# Patient Record
Sex: MALE | Race: WHITE
[De-identification: names, ages, dates, MRNs, and addresses within clinical notes are randomized per-mention and may not be internally consistent; named-entity substitution may affect disease eponyms.]

---

## 2017-06-02 ENCOUNTER — HOSPITAL ENCOUNTER (EMERGENCY)
Dept: HOSPITAL 14 - H.ER | Age: 67
Discharge: HOME | End: 2017-06-02
Payer: MEDICARE

## 2017-06-02 VITALS
HEART RATE: 86 BPM | TEMPERATURE: 98.4 F | SYSTOLIC BLOOD PRESSURE: 146 MMHG | OXYGEN SATURATION: 97 % | DIASTOLIC BLOOD PRESSURE: 90 MMHG | RESPIRATION RATE: 20 BRPM

## 2017-06-02 VITALS — BODY MASS INDEX: 30.1 KG/M2

## 2017-06-02 DIAGNOSIS — R60.0: ICD-10-CM

## 2017-06-02 DIAGNOSIS — N99.89: Primary | ICD-10-CM

## 2017-06-02 LAB
ALBUMIN/GLOB SERPL: 1 {RATIO} (ref 1–2.1)
ALP SERPL-CCNC: 64 U/L (ref 38–126)
ALT SERPL-CCNC: 34 U/L (ref 21–72)
AST SERPL-CCNC: 44 U/L (ref 17–59)
BASOPHILS # BLD AUTO: 0.1 K/UL (ref 0–0.2)
BASOPHILS NFR BLD: 1.3 % (ref 0–2)
BILIRUB SERPL-MCNC: 0.2 MG/DL (ref 0.2–1.3)
BILIRUB UR-MCNC: NEGATIVE MG/DL
BUN SERPL-MCNC: 27 MG/DL (ref 9–20)
CALCIUM SERPL-MCNC: 8.6 MG/DL (ref 8.4–10.2)
CHLORIDE SERPL-SCNC: 104 MMOL/L (ref 98–107)
CO2 SERPL-SCNC: 26 MMOL/L (ref 22–30)
COLOR UR: YELLOW
EOSINOPHIL # BLD AUTO: 0.2 K/UL (ref 0–0.7)
EOSINOPHIL NFR BLD: 3.9 % (ref 0–4)
ERYTHROCYTE [DISTWIDTH] IN BLOOD BY AUTOMATED COUNT: 14.7 % (ref 11.5–14.5)
GLOBULIN SER-MCNC: 3.9 GM/DL (ref 2.2–3.9)
GLUCOSE SERPL-MCNC: 128 MG/DL (ref 75–110)
GLUCOSE UR STRIP-MCNC: (no result) MG/DL
HCT VFR BLD CALC: 35.7 % (ref 35–51)
KETONES UR STRIP-MCNC: NEGATIVE MG/DL
LEUKOCYTE ESTERASE UR-ACNC: (no result) LEU/UL
LYMPHOCYTES # BLD AUTO: 1.6 K/UL (ref 1–4.3)
LYMPHOCYTES NFR BLD AUTO: 26.6 % (ref 20–40)
MCH RBC QN AUTO: 25.6 PG (ref 27–31)
MCHC RBC AUTO-ENTMCNC: 32.2 G/DL (ref 33–37)
MCV RBC AUTO: 79.6 FL (ref 80–94)
MONOCYTES # BLD: 0.6 K/UL (ref 0–0.8)
MONOCYTES NFR BLD: 10.5 % (ref 0–10)
NEUTROPHILS # BLD: 3.4 K/UL (ref 1.8–7)
NEUTROPHILS NFR BLD AUTO: 57.7 % (ref 50–75)
NRBC BLD AUTO-RTO: 0 % (ref 0–0)
PH UR STRIP: 6 [PH] (ref 5–8)
PLATELET # BLD: 302 K/UL (ref 130–400)
PMV BLD AUTO: 8.1 FL (ref 7.2–11.7)
POTASSIUM SERPL-SCNC: 4.3 MMOL/L (ref 3.6–5)
PROT SERPL-MCNC: 7.7 G/DL (ref 6.3–8.2)
PROT UR STRIP-MCNC: 30 MG/DL
RBC # UR STRIP: (no result) /UL
RBC #/AREA URNS HPF: 7 /HPF (ref 0–3)
SODIUM SERPL-SCNC: 141 MMOL/L (ref 132–148)
SP GR UR STRIP: 1.02 (ref 1–1.03)
UROBILINOGEN UR-MCNC: (no result) MG/DL (ref 0.2–1)
WBC # BLD AUTO: 5.9 K/UL (ref 4.8–10.8)
WBC #/AREA URNS HPF: 2 /HPF (ref 0–5)

## 2017-06-02 NOTE — ED PDOC
HPI: Male  Pain


Time Seen by Provider: 06/02/17 12:28


Chief Complaint (Nursing): Male Genitourinary


Chief Complaint (Provider): urinary retention


History Per: Patient, Family


History/Exam Limitations: no limitations


Additional Complaint(s): 





67yo M in ED for evaluation of urinary difficulty , mild bleeding from catheter 

and concerns about this s recent surgical hernia repair. PT was admitted and 

had hernia surgery last week and d/c from [facility] yesterday-pt now in ED 

very  cornered very upset a, yelling at staff members due to his concerns about 

the surgery. Pt state the surgery was not done correctly hence his constipation 

and urinary retention. Pt denies: fever, chills, nausea vomiting abd distention 

other than surgical site, and inability to tolerate PO. pt denies cough, body 

aches. pt most former PMD: [facility]-but states he no longer wants [facility] as his 

provider. 


Pt is stating that there was neglience at [facility] and no one wants to 

help him in regards to the surgery. 


 





Past Medical History


Reviewed: Historical Data, Nursing Documentation, Vital Signs


Vital Signs: 





 Last Vital Signs











Temp  98.4 F   06/02/17 11:54


 


Pulse  86   06/02/17 11:54


 


Resp  20   06/02/17 11:54


 


BP  146/90   06/02/17 11:54


 


Pulse Ox  97   06/02/17 11:54














- Medical History


PMH: Diabetes, HTN





- Surgical History


Surgical History: Hernia Repair





- Family History


Family History: States: Unknown Family Hx





- Immunization History


Hx Tetanus Toxoid Vaccination: No


Hx Influenza Vaccination: No


Hx Pneumococcal Vaccination: No





- Home Medications


Home Medications: 


 Ambulatory Orders











 Medication  Instructions  Recorded


 


Enalapril Maleate [Enalapril] 5 mg PO DAILY 08/19/16


 


Metoprolol Tartrate [Lopressor] 25 mg PO DAILY 08/19/16


 


Rosuvastatin Calcium [Crestor] 20 mg PO DAILY 08/19/16


 


Meclizine [Antivert] 25 mg PO Q8 #10 tab 09/28/16


 


Polyethylene Glycol 3350 [Miralax] 17 gm PO DAILY PRN #10 pkg 09/28/16














- Allergies


Allergies/Adverse Reactions: 


 Allergies











Allergy/AdvReac Type Severity Reaction Status Date / Time


 


No Known Allergies Allergy   Verified 08/19/16 13:15














Review of Systems


ROS Statement: Except As Marked, All Systems Reviewed And Found Negative


Constitutional: Negative for: Fever, Chills, Weakness


Cardiovascular: Negative for: Chest Pain, Palpitations


Respiratory: Negative for: Cough, Shortness of Breath


Gastrointestinal: Negative for: Nausea, Vomiting





Physical Exam





- Reviewed


Nursing Documentation Reviewed: Yes


Vital Signs Reviewed: Yes





- Physical Exam


Appears: Positive for: Well (very aggitated, yelling at nurse), Non-toxic, No 

Acute Distress


Head Exam: Positive for: ATRAUMATIC, NORMAL INSPECTION, NORMOCEPHALIC


Skin: Positive for: Normal Color, Warm, DRY


Eye Exam: Positive for: EOMI, Normal appearance, PERRL


Cardiovascular/Chest: Positive for: Regular Rate, Rhythm


Respiratory: Positive for: CNT, Normal Breath Sounds


Gastrointestinal/Abdominal: Positive for: Normal Exam, Bowel Sounds, Soft, 

Tenderness (lower right abd-hernia repair site. post-op swelling noted, 

nontender. no wound dehescience no drianage no erythema surronding site.  )


Male Genital Exam: Positive for: other (urinary catheter in place-no 

obstruction noted. some mild truama pssible-mild bleeding noted at urethrea 

site. no testicular swelling. )


Back: Positive for: Normal Inspection


Extremity: Positive for: Normal ROM


Neurologic/Psych: Positive for: Alert, Oriented





- Laboratory Results


Result Diagrams: 


 06/02/17 13:10





 06/02/17 13:10





- ECG


O2 Sat by Pulse Oximetry: 97





- Progress


ED Course And Treament: 





MD Justo contacted-explained pt was problematic at [facility] after 

surgery, stating the surgery was not done correctly, staff , including Justo 

have explained to the pt the process and what to except, however pt refuses to 

believe that. PT has an appointment with urology this week.  





Medical Decision Making


Medical Decision Making: 





Ua: no evidence of UTI


CBC/CMP: WNL


VS are stable MD Ema made of pt 


There is no emergent concern at this time to warrant further hospital /ER 

intervention, however pt is strongly advised to maintain appt with urology this 

week. family member is with pt and made aware. 


pt advised to take motrin for pain 


advised to return to an ED if with fever, chills, nausea vomiting or wound 

dehiscence .  


pt given conceriege service contact





Disposition





- Clinical Impression


Clinical Impression: 


 Postoperative urinary retention








- Patient ED Disposition


Is Patient to be Admitted: No


Counseled Patient/Family Regarding: Studies Performed, Diagnosis, Need For 

Followup





- Disposition


Referrals: 


Mahin Cottrell MD [Medical Doctor] - 


Disposition: Routine/Home


Disposition Time: 14:14


Condition: STABLE


Additional Instructions: 


please make sure to follow up with a urologist this week. 


Instructions:  Urinary Retention in Men (ED)


Forms:  [facility] (English)

## 2017-06-02 NOTE — US
PROCEDURE:  Limited soft tissue ultrasound



HISTORY:

r/o hematoma to right lower quad surigcal site



COMPARISON:

Not available



TECHNIQUE:

Examination of the anterior abdominal wall was performed utilizing a 

curved linear array transducer.



FINDINGS:

There is a complex fluid collection with internal echoes, likely 

reflecting blood products. It is irregularly shaped but measures 

roughly 3.2 x 3.1 x 7.8 cm. There is no blood flow seen within this 

on color Doppler interrogation. There are small bright reflectors 

likely representing gas bubbles identified within this collection. 

There is no evidence of bowel herniation in this region.



IMPRESSION:

Probable postoperative collection in the anterior abdominal wall. 

Small bubbles of gas are identified within this collection. No bowel 

identified in the region interrogated.

## 2017-06-14 ENCOUNTER — HOSPITAL ENCOUNTER (INPATIENT)
Dept: HOSPITAL 31 - C.ER | Age: 67
LOS: 1 days | Discharge: LEFT BEFORE DISCHARGE | DRG: 920 | End: 2017-06-15
Attending: INTERNAL MEDICINE | Admitting: INTERNAL MEDICINE
Payer: MEDICARE

## 2017-06-14 VITALS — RESPIRATION RATE: 20 BRPM

## 2017-06-14 VITALS — BODY MASS INDEX: 30.1 KG/M2
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
## 2017-06-14 DIAGNOSIS — J98.11: ICD-10-CM

## 2017-06-14 DIAGNOSIS — E78.5: ICD-10-CM

## 2017-06-14 DIAGNOSIS — N17.9: ICD-10-CM

## 2017-06-14 DIAGNOSIS — T81.31XA: ICD-10-CM

## 2017-06-14 DIAGNOSIS — N39.0: ICD-10-CM

## 2017-06-14 DIAGNOSIS — R33.8: ICD-10-CM

## 2017-06-14 DIAGNOSIS — I25.10: ICD-10-CM

## 2017-06-14 DIAGNOSIS — I10: ICD-10-CM

## 2017-06-14 DIAGNOSIS — E11.9: ICD-10-CM

## 2017-06-14 DIAGNOSIS — N43.3: ICD-10-CM

## 2017-06-14 DIAGNOSIS — M10.9: ICD-10-CM

## 2017-06-14 DIAGNOSIS — K76.0: ICD-10-CM

## 2017-06-14 DIAGNOSIS — T85.79XA: Primary | ICD-10-CM

## 2017-06-14 DIAGNOSIS — E87.5: ICD-10-CM

## 2017-06-14 LAB
ALBUMIN/GLOB SERPL: 0.9 {RATIO} (ref 1–2.1)
ALP SERPL-CCNC: 66 U/L (ref 38–126)
ALT SERPL-CCNC: 15 U/L (ref 21–72)
APTT BLD: 26 SECONDS (ref 21–34)
AST SERPL-CCNC: 40 U/L (ref 17–59)
BACTERIA #/AREA URNS HPF: (no result) /[HPF]
BASOPHILS # BLD AUTO: 0.1 K/UL (ref 0–0.2)
BASOPHILS NFR BLD: 1.1 % (ref 0–2)
BILIRUB SERPL-MCNC: 1.3 MG/DL (ref 0.2–1.3)
BILIRUB UR-MCNC: NEGATIVE MG/DL
BUN SERPL-MCNC: 22 MG/DL (ref 9–20)
CALCIUM SERPL-MCNC: 8.2 MG/DL (ref 8.6–10.4)
CHLORIDE SERPL-SCNC: 98 MMOL/L (ref 98–107)
CO2 SERPL-SCNC: 24 MMOL/L (ref 22–30)
EOSINOPHIL # BLD AUTO: 0.1 K/UL (ref 0–0.7)
EOSINOPHIL NFR BLD: 0.8 % (ref 0–4)
ERYTHROCYTE [DISTWIDTH] IN BLOOD BY AUTOMATED COUNT: 14.5 % (ref 11.5–14.5)
GLOBULIN SER-MCNC: 4.5 GM/DL (ref 2.2–3.9)
GLUCOSE SERPL-MCNC: 107 MG/DL (ref 75–110)
GLUCOSE UR STRIP-MCNC: NORMAL MG/DL
HCT VFR BLD CALC: 34 % (ref 35–51)
INR PPP: 1.2
KETONES UR STRIP-MCNC: NEGATIVE MG/DL
LEUKOCYTE ESTERASE UR-ACNC: (no result) LEU/UL
LYMPHOCYTES # BLD AUTO: 1.3 K/UL (ref 1–4.3)
LYMPHOCYTES NFR BLD AUTO: 13.3 % (ref 20–40)
MCH RBC QN AUTO: 25.4 PG (ref 27–31)
MCHC RBC AUTO-ENTMCNC: 32.5 G/DL (ref 33–37)
MCV RBC AUTO: 78.2 FL (ref 80–94)
MONOCYTES # BLD: 0.8 K/UL (ref 0–0.8)
MONOCYTES NFR BLD: 7.8 % (ref 0–10)
NRBC BLD AUTO-RTO: 0 % (ref 0–2)
PH UR STRIP: 5 [PH] (ref 5–8)
PLATELET # BLD: 370 K/UL (ref 130–400)
PMV BLD AUTO: 8 FL (ref 7.2–11.7)
POTASSIUM SERPL-SCNC: 5.5 MMOL/L (ref 3.6–5.2)
PROT SERPL-MCNC: 8.7 G/DL (ref 6.3–8.3)
PROT UR STRIP-MCNC: (no result) MG/DL
RBC # UR STRIP: (no result) /UL
RBC #/AREA URNS HPF: 14 /HPF (ref 0–3)
SODIUM SERPL-SCNC: 132 MMOL/L (ref 132–148)
SP GR UR STRIP: 1.01 (ref 1–1.03)
UROBILINOGEN UR-MCNC: NORMAL MG/DL (ref 0.2–1)
WBC # BLD AUTO: 9.7 K/UL (ref 4.8–10.8)
WBC #/AREA URNS HPF: 152 /HPF (ref 0–5)

## 2017-06-14 RX ADMIN — TAZOBACTAM SODIUM AND PIPERACILLIN SODIUM SCH MLS/HR: 250; 2 INJECTION, SOLUTION INTRAVENOUS at 20:09

## 2017-06-14 RX ADMIN — TAZOBACTAM SODIUM AND PIPERACILLIN SODIUM SCH MLS/HR: 250; 2 INJECTION, SOLUTION INTRAVENOUS at 19:42

## 2017-06-14 NOTE — C.PDOC
History Of Present Illness





Patient with PMHX of DM, HTN presents to ED c/o RLQ pain, swelling since having 

a hernia repair at [facility] May 2017.  Patient states the area has also been 

intermittently bleeding, and yesterday he developed a fever.  He has also still 

has indwelling dalal since the surgery due to "bleeding from penis" when dalal 

removal was attempted.  He and wife do not know name of original surgeon, but 

state they do not want to see him again due to issues he has had since surgery.

   Patient denies cough, CP, SOB, vomiting/diarrhea.


Time Seen by Provider: 06/14/17 14:19


Chief Complaint (Nursing): Abdominal Pain


History Per: Patient, Family


History/Exam Limitations: no limitations


Onset/Duration Of Symptoms: Persistent


Current Symptoms Are (Timing): Still Present


Severity: Mild


Location Of Pain/Discomfort: RLQ


Quality Of Discomfort: "Pain"


Associated Symptoms: Fever





Past Medical History


Reviewed: Historical Data, Nursing Documentation, Vital Signs


Vital Signs: 


 Last Vital Signs











Temp  100.3 F H  06/14/17 14:09


 


Pulse  89   06/14/17 17:49


 


Resp  18   06/14/17 17:49


 


BP  159/81 H  06/14/17 17:49


 


Pulse Ox  95   06/14/17 18:24














- Medical History


PMH: Diabetes, HTN


Surgical History: Hernia Repair


Family History: States: No Known Family Hx





- Social History


Hx Alcohol Use: No


Hx Substance Use: No





- Immunization History


Hx Tetanus Toxoid Vaccination: No


Hx Influenza Vaccination: No


Hx Pneumococcal Vaccination: No





Review Of Systems


Except As Marked, All Systems Reviewed And Found Negative.


Constitutional: Positive for: Fever


Cardiovascular: Negative for: Chest Pain, Palpitations


Respiratory: Negative for: Cough, Shortness of Breath


Gastrointestinal: Positive for: Abdominal Pain.  Negative for: Nausea, Vomiting

, Diarrhea


Genitourinary: Positive for: Other (indwelling dalal)





Physical Exam





- Physical Exam


Appears: Non-toxic, In Acute Distress (in mild pain)


Skin: Other (RLQ/inguinal approx 6cm surgical wound with overlying skin glue, (+

) TTP and mildly swollen, no obvious erythema or discharge/bleeding)


Oral Mucosa: Moist


Cardiovascular: Rhythm Regular


Respiratory: Normal Breath Sounds, No Rales, No Rhonchi, No Wheezing


Gastrointestinal/Abdominal: Bowel Sounds, Soft, Tenderness (TTP at surgical 

wound), No Distention, No Guarding, No Rebound


Male Genital: Other (indwelling dalal catheter)


Neurological/Psych: Oriented x3





ED Course And Treatment





- Laboratory Results


Result Diagrams: 


 06/14/17 15:12





 06/14/17 15:12


O2 Sat by Pulse Oximetry: 95 (RA)


Pulse Ox Interpretation: Normal





- CT Scan/US


  ** CT ABD/PELVIS 


Other Rad Studies (CT/US): Read By Radiologist, Radiology Report Reviewed


CT/US Interpretation: Accession No. : [id number].  Patient Name / ID : 

NIESHA FUENTESUH  / [id number].  Exam Date : 06/14/2017 17:24:29 ( Approved ).  

Study Comment :  Sex / Age : M  / 66Y.  Creator : Angie Head MD.  

Dictator : Angie Head MD.   :  Approver : Angie Head MD.  Approver2 :  Report Date : 06/14/2017 17:31:52.  My Comment :  

********************************************************************************

***.  PROCEDURE:  CT Abdomen and Pelvis with contrast.  HISTORY:  rlq/inguinal 

pain, fever, swelling, r/o abscess.  COMPARISON:  None available.  TECHNIQUE:  

Contrast dose: 100 mL Visipaque.  Radiation dose:  Total exam DLP = 649.60 mGy-

cm.  This CT exam was performed using one or more of the following dose 

reduction techniques: Automated exposure control, adjustment of the mA and/or 

kV according to patient size, and/or use of iterative reconstruction technique.

  FINDINGS:  LOWER THORAX:  Left basilar atelectasis or infiltrate. Mild right 

basilar atelectasis. No visible pleural effusion or pneumothorax.  Small hiatal 

hernia/distal esophageal wall thickening.  LIVER:  Hepatomegaly. 

Hypoattenuation of the liver compatible with hepatic steatosis.  GALLBLADDER 

AND BILE DUCTS:  Unremarkable.  PANCREAS:  Unremarkable.  SPLEEN:  

Unremarkable.  ADRENALS:  Bilateral adrenal gland hypertrophy.  KIDNEYS AND 

URETERS:  The kidneys enhance symmetrically. No hydronephrosis or obstructing 

calculus identified.  VASCULATURE:  No aortic aneurysm.  BOWEL:  Stomach is 

nondistended.  Lack of oral contrast limits evaluation for bowel pathology.  

Bowel loops appear within normal limits of caliber without evidence of 

obstruction.  APPENDIX:  The presumed appendix appears within normal limits of 

caliber. No secondary signs of acute appendicitis.  PERITONEUM:  No significant 

free fluid. No definite free air.  LYMPH NODES:  No bulky adenopathy 

identified.  BLADDER:  Thick-walled under distended urinary bladder with Dalal 

catheter present.  Punctate calcification at the dependent portion of the 

bladder measuring approximately 2 mm.  REPRODUCTIVE:  Enlarged heterogeneous 

prostate gland measures approximately 5.6 x 5.5 cm. Partially imaged probable 

hydroceles.  BONES:  Degenerative changes.  Osseous demineralization. Grade 1 

retrolisthesis of L2 on L3.  Endplate sclerosis at L2-L3 with vacuum disc 

phenomenon.  Vacuum disc phenomenon is also noted at L5-S1.  OTHER FINDINGS:  

Evidence of right inguinal hernia with fluid collection, possibly abscess 

measuring maximally 7.7 x 2.8 cm (series 3, image 171). Bowel is not contained 

within this hernia sac. Fat containing left inguinal hernia.  IMPRESSION:  

Evidence of right inguinal hernia with fluid collection, possibly abscess 

measuring maximally 7.7 x 2.8 cm. Fat containing left inguinal hernia.  Thick-

walled under distended urinary bladder with Dalal catheter present. Punctate 

calcification at the dependent portion of the bladder measuring approximately 2 

mm.  Enlarged heterogeneous prostate gland.  Recommend correlation with PSA.  

Probable bilateral hydroceles.  Hepatomegaly. Hepatic steatosis.  Bilateral 

adrenal gland hypertrophy.  Left basilar atelectasis or infiltrate. Mild right 

basilar atelectasis. No visible pleural effusion or pneumothorax.  Small hiatal 

hernia/distal esophageal wall thickening.  Additional findings as above.


Progress Note: Blood work, UA, CT abd/pelvis ordered and reviewed.  Patient 

given IV NS bolus, PO tylenol.  IV rocephin given for UTI.  IV clindamycin 

added for abscess.  5:40pm- Surgical resident paged to discuss Ct scan findings 

and plan for patient - pending call back.  6:50pm- Surgical resident aware of 

consult, requests consult be changed to Dr. Dillon at Dr. Engel's request.





- Physician Consult Information


Physician Contacted: Isidro Engel Jr.


Outcome Of Conversation: Discused patient with Dr. Engel, agrees with medicine 

admission to his service with on call surgery for surgical consult.  pending 

call back from surgery resident.  Medicine resident spoken with and aware of 

admission.

## 2017-06-14 NOTE — CP.PCM.HP
History of Present Illness





- History of Present Illness


History of Present Illness: 





CC: "fever, dizzinessw" 


HPI: Patient is a 66 year old male with history of hypertension, coronary 

artery disease, and diabetes who presents to the emergency room for swelling 

and intermittent bleeding to right inguinal hernia repair site. Patient 

reportedly had a right inguinal hernia repair performed at [facility] [place] [facility] on 5/28/17. 

Patient states he initially went to [facility] [place] [facility] for "colon inflammation" and he was 

told during admission he would need hernia repair. Patient states he did not 

want to have surgery but "eventually was convinced by the team." He states 

following the surgery, there was complication of acute abdominal bleeding. 

Patient notes he immediately felt discomfort to the surgical site and "knew 

something was wrong." He reports he was informed he would need physical therapy 

but refused. Patient states he was properly discharged but was not given 

prescriptions or instruction regarding when his indwelling dalal should be 

removed. Patient reports since being home, he developed bleeding from the 

surgical site, swelling and pain. He went to [facility] approximately one 

week to be re-evaluated and was told he has a hematoma and would again require 

surgery. At this time, his dalal was attempted to be removed, however, patient 

states they were unsuccessful due to complication of bleeding. Patient 

ultimately left [facility] as he did not want surgery. Today he was 

brought into the ED by his family as he developed fevers at home. Patient also 

reports dizziness causing him difficulty to walk. He denies chest pain, 

palpitations, shortness of breath, nausea, vomiting, diarrhea, constipation, 

and lower extremity swelling. 





PMH: see HPI 


Home medications: Enalapril 5 mg po qd, Metoprolol 25 mg po qd, Crestor 20 mg 

po qd


Allergies: NKDA


Family History: not pertinent 


Surgical History: R inguinal hernia repair 5/2017


Social: Denies tobacco, alcohol and illicit drug use 








Present on Admission





- Present on Admission


Any Indicators Present on Admission: No


History of DVT/PE: No


History of Uncontrolled Diabetes: No


Urinary Catheter: No


Decubitus Ulcer Present: No





Review of Systems





- Constitutional


Constitutional: Chills, Fatigue, Fever.  absent: Headache





- EENT


Eyes: absent: Blurred Vision, Change in Vision


Ears: Decreased Hearing.  absent: Ear Pain


Nose/Mouth/Throat: absent: Nasal Congestion, Nasal Discharge





- Cardiovascular


Cardiovascular: absent: Chest Pain, Dyspnea, Dyspnea on Exertion, Edema, 

Palpitations





- Respiratory


Respiratory: absent: Cough, Wheezing, Chest Congestion





- Gastrointestinal


Gastrointestinal: As Per HPI, Abdominal Pain.  absent: Constipation, Cramping, 

Diarrhea, Nausea, Vomiting





- Genitourinary


Genitourinary: absent: Flank Pain


Additional comments: 





indwelling dalal in place, urine in dalal bag cloudy





- Musculoskeletal


Musculoskeletal: absent: Arthralgias, Back Pain





- Integumentary


Integumentary: absent: New Lesions





- Neurological


Neurological: Dizziness, Weakness.  absent: Confusion, Numbness, Headaches





- Psychiatric


Psychiatric: absent: Anxiety, Depression





Past Patient History





- Past Social History


Smoking Status: Never Smoked





- CARDIAC


Hx Hypertension: Yes





- ENDOCRINE/METABOLIC


Hx Diabetes Mellitus Type 2: Yes





- MUSCULOSKELETAL/RHEUMATOLOGICAL


Hx Gout: Yes





- GASTROINTESTINAL


Hx Constipation: Yes





- PSYCHIATRIC


Hx Substance Use: No





- SURGICAL HISTORY


Hx Surgeries: Yes


Hx Herniorrhaphy: Yes





- ANESTHESIA


Hx Anesthesia: Yes


Hx Anesthesia Reactions: No


Hx Malignant Hyperthermia: No





Meds


Allergies/Adverse Reactions: 


 Allergies











Allergy/AdvReac Type Severity Reaction Status Date / Time


 


No Known Allergies Allergy   Verified 06/14/17 14:21














Physical Exam





- Constitutional


Appears: Non-toxic, No Acute Distress





- Head Exam


Head Exam: ATRAUMATIC, NORMAL INSPECTION, NORMOCEPHALIC





- Eye Exam


Eye Exam: EOMI, Normal appearance





- ENT Exam


ENT Exam: Mucous Membranes Moist





- Respiratory Exam


Respiratory Exam: Clear to Auscultation Bilateral, NORMAL BREATHING PATTERN.  

absent: Rales, Rhonchi, Wheezes





- Cardiovascular Exam


Cardiovascular Exam: +S1, +S2.  absent: Bradycardia, Tachycardia





- GI/Abdominal Exam


GI & Abdominal Exam: Distended, Normal Bowel Sounds, Soft


Additional comments: 





4 cm surgical laceration site with dehiscence and some surrounding erythema. 

Induration of surrounding area with pain present to palpation. 





- Extremities Exam


Extremities exam: Positive for: normal inspection, pedal pulses present.  

Negative for: pedal edema, tenderness





- Back Exam


Back exam: NORMAL INSPECTION.  absent: CVA tenderness (L), CVA tenderness (R)





- Neurological Exam


Neurological exam: Alert, CN II-XII Intact, Oriented x3





- Psychiatric Exam


Psychiatric exam: Normal Affect, Normal Mood





- Skin


Skin Exam: Intact, Normal Color





Results





- Vital Signs


Recent Vital Signs: 





 Last Vital Signs











Temp  100.3 F H  06/14/17 14:09


 


Pulse  89   06/14/17 17:49


 


Resp  18   06/14/17 17:49


 


BP  159/81 H  06/14/17 17:49


 


Pulse Ox  95   06/14/17 18:50














- Labs


Result Diagrams: 


 06/14/17 15:12





 06/14/17 15:12





Assessment & Plan





- Assessment and Plan (Free Text)


Assessment: 





Right Inguinal Hernia Abscess 


No leukocytosis, Neutrophils 77.0%, temp 100.3F


CT Abd/Pelvis CT: Evidence of right inguinal hernia with fluid collection, 

possibly abscess measuring maximally 7.7 x 2.8 cm. Fat containing left inguinal 

hernia. 


Thick-walled under distended urinary bladder with Dalal catheter present. 

Punctate calcification at the dependent portion of the bladder measuring 

approximately 2 mm. Enlarged heterogeneous prostate gland. Probable bilateral 

hydroceles. Hepatomegaly. Hepatic steatosis. Bilateral adrenal gland 

hypertrophy. Left basilar atelectasis or infiltrate. Mild right basilar 

atelectasis. No visible pleural effusion or pneumothorax. Small hiatal hernia/

distal esophageal wall thickening. 


Dose of Rocephin and Clindamycin given stat in ED 


Gentamycin 80 mg IVPB and Vancomycin 1 gm IVPB given stat 


Zosyn 2.25 gm IVPB Q6h started 


Surgery, Dr. Dillon, consulted. Help appreciated. Plan for OR tomorrow.


Morphine 2 mg IVP q4h prn for pain  


NPO at midnight 


EKG, CXR, PT/PTT/INR ordered 


Tylenol PRN for fever 





UTI 


Dalal present since May


Urinalysis: color hazy, blood 2+, nitrate positive, Leuk Esterase 3+, WBC, 14, 

RBC 14, Bacterial Occ H 


f/u urine cx


f/u blood cx x2


Urology, Dr. Du, consulted. Help appreciated. Recommends starting Flomax 

0.4 mg po BID tomorrow. Will evaluate pt following surgery and remove dalal. 





Hyperkalemia 


potassium 5.5 


Kayexalate 30 gm po once given 


Albuterol given once 


f/u potassium 





DANIEL


CR 1.3 


CR on previous admission 2016, 1.1 


Started on IV normal saline at 70cc/hr 


Monitor 





Hypertension


Start home medications: Enalapril 5 mg po daily, Metoprolol 25 mg po daily 


Monitor 





Hyperlipidemia 


f/u lipid panel


continue Crestor 20 mg po HS 





Diabetes Mellitus type II 


glucose 107


accuchecks 


f/u hemoglobin a1c


Monitor 





Prophylaxis 


Protonix 40 mg IVP daily 


SCD 

















- Date & Time


Date: 06/14/17


Time: 20:24

## 2017-06-14 NOTE — CT
PROCEDURE:  CT Abdomen and Pelvis with contrast



HISTORY:

rlq/inguinal pain, fever, swelling, r/o abscess



COMPARISON:

None available.



TECHNIQUE:

Contrast dose: 100 mL Visipaque



Radiation dose:



Total exam DLP = 649.60 mGy-cm.



This CT exam was performed using one or more of the following dose 

reduction techniques: Automated exposure control, adjustment of the 

mA and/or kV according to patient size, and/or use of iterative 

reconstruction technique.



FINDINGS:



LOWER THORAX:

Left basilar atelectasis or infiltrate. Mild right basilar 

atelectasis. No visible pleural effusion or pneumothorax. 



Small hiatal hernia/distal esophageal wall thickening. 



LIVER:

Hepatomegaly. Hypoattenuation of the liver compatible with hepatic 

steatosis. 



GALLBLADDER AND BILE DUCTS:

Unremarkable. 



PANCREAS:

Unremarkable. 



SPLEEN:

Unremarkable. 



ADRENALS:

Bilateral adrenal gland hypertrophy. 



KIDNEYS AND URETERS:

The kidneys enhance symmetrically. No hydronephrosis or obstructing 

calculus identified. 



VASCULATURE:

No aortic aneurysm. 



BOWEL:

Stomach is nondistended.  



Lack of oral contrast limits evaluation for bowel pathology.  Bowel 

loops appear within normal limits of caliber without evidence of 

obstruction.



APPENDIX:

The presumed appendix appears within normal limits of caliber. No 

secondary signs of acute appendicitis.



PERITONEUM:

No significant free fluid. No definite free air. 



LYMPH NODES:

No bulky adenopathy identified. 



BLADDER:

Thick-walled under distended urinary bladder with Olivo catheter 

present.  Punctate calcification at the dependent portion of the 

bladder measuring approximately 2 mm. 



REPRODUCTIVE:

Enlarged heterogeneous prostate gland measures approximately 5.6 x 

5.5 cm. Partially imaged probable hydroceles. 



BONES:

Degenerative changes.  Osseous demineralization. Grade 1 

retrolisthesis of L2 on L3.  Endplate sclerosis at L2-L3 with vacuum 

disc phenomenon.  Vacuum disc phenomenon is also noted at L5-S1. 



OTHER FINDINGS:

Evidence of right inguinal hernia with fluid collection, possibly 

abscess measuring maximally 7.7 x 2.8 cm (series 3, image 171). Bowel 

is not contained within this hernia sac. Fat containing left inguinal 

hernia. 



IMPRESSION:

Evidence of right inguinal hernia with fluid collection, possibly 

abscess measuring maximally 7.7 x 2.8 cm. Fat containing left 

inguinal hernia. 



Thick-walled under distended urinary bladder with Olivo catheter 

present. Punctate calcification at the dependent portion of the 

bladder measuring approximately 2 mm. 



Enlarged heterogeneous prostate gland.  Recommend correlation with 

PSA. 



Probable bilateral hydroceles. 



Hepatomegaly. Hepatic steatosis. 



Bilateral adrenal gland hypertrophy. 



Left basilar atelectasis or infiltrate. Mild right basilar 

atelectasis. No visible pleural effusion or pneumothorax. 



Small hiatal hernia/distal esophageal wall thickening. 



Additional findings as above.

## 2017-06-14 NOTE — CP.PCM.CON
History of Present Illness





- History of Present Illness


History of Present Illness: 





General Surgery:  Dr Dillon





Pt is a 66M ~3 weeks s/p right inguinal hernia repair.  Pt states he was 

admitted to [facility] with abdominal pain and constipation 3 weeks ago.  While there 

he was told he had an inguinal hernia and underwent open repair.  Pt states post

-op he had hematuria and had a dalal placed.  He was discharged with dalal in 

place.  Pt states he began to develop worsening pain at the site of hernia 

repair, and wanted the dalal out, so he went back to [facility].  He was told there 

was nothing wrong and to follow up in the office.  Pt then went to [facility] where 

he had work-up and was told her had a hematoma and to return to original 

surgeon.  Pt states original surgeon told him nothing is wrong and to keep the 

dalal in for another week.  This information is all directly from pt and there 

are no records to support this available.  Pt presents today to ED with 

worsening pain, subjective fevers, and reports puss has been draining from the 

incision, which has now opened up.  Pt does not want to return to [facility] or the 

original surgeon.  Per CT scan pt appears to have an infected right inguinal 

hernia mesh.





ROS:  Denies N/V, diarrhea or constipation.  + for fevers, weakness, pain at 

site of meatus with dalal





PMH: HTN, DM2


PSH:  right inguinal hernia repair with mesh





Review of Systems





- Review of Systems


All systems: reviewed and no additional remarkable complaints except (as per hpi

)





Past Patient History





- Past Social History


Smoking Status: Never Smoked





- CARDIAC


Hx Hypertension: Yes





- ENDOCRINE/METABOLIC


Hx Diabetes Mellitus Type 2: Yes





- MUSCULOSKELETAL/RHEUMATOLOGICAL


Hx Gout: Yes





- GASTROINTESTINAL


Hx Constipation: Yes





- PSYCHIATRIC


Hx Substance Use: No





- SURGICAL HISTORY


Hx Surgeries: Yes


Hx Herniorrhaphy: Yes





- ANESTHESIA


Hx Anesthesia: Yes


Hx Anesthesia Reactions: No


Hx Malignant Hyperthermia: No





Meds


Allergies/Adverse Reactions: 


 Allergies











Allergy/AdvReac Type Severity Reaction Status Date / Time


 


No Known Allergies Allergy   Verified 06/14/17 14:21














- Medications


Medications: 


 Current Medications





Gentamicin Sulfate 80 mg/ (Sodium Chloride)  102 mls @ 100 mls/hr IVPB ONCE ONE


   Stop: 06/14/17 20:07


Vancomycin HCl 1 gm/ Sodium (Chloride)  250 mls @ 166.7 mls/hr IVPB STAT STA


   Stop: 06/14/17 20:36


Piperacillin Sod/Tazobactam Sod (Zosyn 2.25 Gm Iv Premix)  2.25 gm in 50 mls @ 

100 mls/hr IVPB Q6H [facility]


   Last Admin: 06/14/17 19:42 Dose:  100 mls/hr


Sodium Chloride (Sodium Chloride 0.9%)  1,000 mls @ 70 mls/hr IV .[id number] [facility]


   Last Admin: 06/14/17 19:42 Dose:  70 mls/hr


Tamsulosin HCl (Flomax)  0.4 mg PO BID [facility]











Physical Exam





- Constitutional


Appears: Non-toxic, No Acute Distress





- Head Exam


Head Exam: NORMOCEPHALIC





- Respiratory Exam


Respiratory Exam: absent: Accessory Muscle Use, Respiratory Distress





- Cardiovascular Exam


Cardiovascular Exam: REGULAR RHYTHM.  absent: Tachycardia





- GI/Abdominal Exam


GI & Abdominal Exam: Soft, Tenderness (at site of right inguinal hernia repair)

.  absent: Distended, Firm, Guarding


Additional comments: 





site of right inguinal hernia is erythematous, draining purulent fluid from 

medial pin-hole sized opening.  The area is swollen and indurated.  Very TTP





- Extremities Exam


Extremities exam: Negative for: pedal edema





- Neurological Exam


Neurological exam: Alert, Oriented x3





- Psychiatric Exam


Psychiatric exam: Normal Affect, Normal Mood





- Skin


Skin Exam: Normal Color, Warm





Results





- Vital Signs


Recent Vital Signs: 


 Last Vital Signs











Temp  100.3 F H  06/14/17 14:09


 


Pulse  89   06/14/17 17:49


 


Resp  18   06/14/17 17:49


 


BP  159/81 H  06/14/17 17:49


 


Pulse Ox  95   06/14/17 18:50














- Labs


Result Diagrams: 


 06/14/17 15:12





 06/14/17 15:12





Assessment & Plan





- Assessment and Plan (Free Text)


Assessment: 





66M with infected right inguinal hernia mesh


Plan: 





Pt will likely need removal of mesh


will plan for OR tomorrow


NPO @ [facility]


coags, type and sceen





will d/w Dr Santana Odonnell, PGY2





- Date & Time


Date: 06/14/17


Time: 19:54

## 2017-06-15 VITALS — DIASTOLIC BLOOD PRESSURE: 90 MMHG | SYSTOLIC BLOOD PRESSURE: 148 MMHG

## 2017-06-15 VITALS — HEART RATE: 70 BPM | OXYGEN SATURATION: 96 % | TEMPERATURE: 98.7 F

## 2017-06-15 LAB
ALBUMIN/GLOB SERPL: 0.8 {RATIO} (ref 1–2.1)
ALP SERPL-CCNC: 60 U/L (ref 38–126)
ALT SERPL-CCNC: 20 U/L (ref 21–72)
APTT BLD: 28 SECONDS (ref 21–34)
AST SERPL-CCNC: 16 U/L (ref 17–59)
BASOPHILS # BLD AUTO: 0.1 K/UL (ref 0–0.2)
BASOPHILS NFR BLD: 0.9 % (ref 0–2)
BILIRUB SERPL-MCNC: 0.6 MG/DL (ref 0.2–1.3)
BUN SERPL-MCNC: 21 MG/DL (ref 9–20)
CALCIUM SERPL-MCNC: 7.4 MG/DL (ref 8.6–10.4)
CHLORIDE SERPL-SCNC: 101 MMOL/L (ref 98–107)
CHOLEST SERPL-MCNC: 127 MG/DL (ref 0–199)
CO2 SERPL-SCNC: 23 MMOL/L (ref 22–30)
EOSINOPHIL # BLD AUTO: 0.1 K/UL (ref 0–0.7)
EOSINOPHIL NFR BLD: 0.9 % (ref 0–4)
ERYTHROCYTE [DISTWIDTH] IN BLOOD BY AUTOMATED COUNT: 14.3 % (ref 11.5–14.5)
GLOBULIN SER-MCNC: 3.6 GM/DL (ref 2.2–3.9)
GLUCOSE SERPL-MCNC: 107 MG/DL (ref 75–110)
HCT VFR BLD CALC: 31.3 % (ref 35–51)
INR PPP: 1.3
LYMPHOCYTES # BLD AUTO: 1.1 K/UL (ref 1–4.3)
LYMPHOCYTES NFR BLD AUTO: 12.7 % (ref 20–40)
MAGNESIUM SERPL-MCNC: 1.6 MG/DL (ref 1.6–2.3)
MCH RBC QN AUTO: 25.6 PG (ref 27–31)
MCHC RBC AUTO-ENTMCNC: 32.3 G/DL (ref 33–37)
MCV RBC AUTO: 79.1 FL (ref 80–94)
MONOCYTES # BLD: 0.8 K/UL (ref 0–0.8)
MONOCYTES NFR BLD: 10.2 % (ref 0–10)
NRBC BLD AUTO-RTO: 0 % (ref 0–2)
PLATELET # BLD: 301 K/UL (ref 130–400)
PMV BLD AUTO: 7.9 FL (ref 7.2–11.7)
POTASSIUM SERPL-SCNC: 3.7 MMOL/L (ref 3.6–5.2)
PROT SERPL-MCNC: 6.6 G/DL (ref 6.3–8.3)
SODIUM SERPL-SCNC: 135 MMOL/L (ref 132–148)
WBC # BLD AUTO: 8.3 K/UL (ref 4.8–10.8)

## 2017-06-15 RX ADMIN — TAZOBACTAM SODIUM AND PIPERACILLIN SODIUM SCH MLS/HR: 250; 2 INJECTION, SOLUTION INTRAVENOUS at 06:16

## 2017-06-15 RX ADMIN — TAZOBACTAM SODIUM AND PIPERACILLIN SODIUM SCH MLS/HR: 250; 2 INJECTION, SOLUTION INTRAVENOUS at 01:01

## 2017-06-15 NOTE — CARD
--------------- APPROVED REPORT --------------





EKG Measurement

Heart [id number]

ND [id number]

[id number]

[id number]

[id number]



<Conclusion>

Normal sinus rhythm

Inferior infarct, age undetermined

Abnormal ECG

## 2017-06-15 NOTE — CP.PCM.PN
Subjective





- Date & Time of Evaluation


Date of Evaluation: 06/15/17


Time of Evaluation: 11:20





- Subjective


Subjective: 





PGY-1 note for Dr. Engel's service: 





Pt seen and examined at bedside. 





Objective





- Vital Signs/Intake and Output


Vital Signs (last 24 hours): 


 











Temp Pulse Resp BP Pulse Ox


 


 98.7 F   70   20   148/90   96 


 


 06/15/17 08:16  06/15/17 08:16  06/15/17 08:16  06/15/17 08:16  06/15/17 08:16








Intake and Output: 


 











 06/15/17 06/15/17





 06:59 18:59


 


Intake Total 500 1000


 


Output Total 500 250


 


Balance 0 750














- Medications


Medications: 


 Current Medications





Enalapril Maleate (Vasotec)  5 mg PO DAILY [facility]


Piperacillin Sod/Tazobactam Sod (Zosyn 2.25 Gm Iv Premix)  2.25 gm in 50 mls @ 

100 mls/hr IVPB Q6H [facility]


   Last Admin: 06/15/17 06:16 Dose:  100 mls/hr


Sodium Chloride (Sodium Chloride 0.9%)  1,000 mls @ 125 mls/hr IV .Q8H [facility]


   Last Admin: 06/15/17 06:19 Dose:  125 mls/hr


Metoprolol Tartrate (Lopressor)  25 mg PO DAILY [facility]


Morphine Sulfate (Morphine)  2 mg IVP Q4 PRN


   PRN Reason: Pain, Mild (1-3)


Pantoprazole Sodium (Protonix Inj)  40 mg IVP DAILY [facility]


Rosuvastatin Calcium (Crestor)  20 mg PO HS [facility]


   Last Admin: 06/14/17 21:29 Dose:  20 mg


Tamsulosin HCl (Flomax)  0.4 mg PO BID [facility]











- Labs


Labs: 


 





 06/15/17 07:09 





 06/15/17 07:09 





 











PT  14.8 SECONDS (9.7-12.2)  H  06/15/17  07:09    


 


INR  1.3   06/15/17  07:09    


 


APTT  28 SECONDS (21-34)   06/15/17  07:09

## 2017-06-15 NOTE — CP.PCM.DIS
Provider





- Provider


Date of Admission: 


06/14/17 17:57





Attending physician: 


Isidro Engel Jr, MD





Primary care physician: 





none


Consults: 





Gen Surg: Santana


Urology: Ana Laura


Time Spent in preparation of Discharge (in minutes): 45





Diagnosis





- Discharge Diagnosis


(1) Postoperative urinary retention


Status: Acute   


Comment: Long term in-dwelling dalal catheter. Attempts at removal in past have 

led to bleeding.  Urology consult, Dr. Du, consulted   





(2) H/O inguinal hernia repair


Status: Acute   


Comment: Pt reports [facility] inguinal hernia repair in late May 2017. Says mesh was 

inserted and told recently at [place] it had to be removed.   





Hospital Course





- Lab Results


Lab Results: 


 Most Recent Lab Values











WBC  8.3 K/uL (4.8-10.8)   06/15/17  07:09    


 


RBC  3.96 Mil/uL (4.40-5.90)  L  06/15/17  07:09    


 


Hgb  10.1 g/dL (12.0-18.0)  L  06/15/17  07:09    


 


Hct  31.3 % (35.0-51.0)  L  06/15/17  07:09    


 


MCV  79.1 fL (80.0-94.0)  L  06/15/17  07:09    


 


MCH  25.6 pg (27.0-31.0)  L  06/15/17  07:09    


 


MCHC  32.3 g/dL (33.0-37.0)  L  06/15/17  07:09    


 


RDW  14.3 % (11.5-14.5)   06/15/17  07:09    


 


Plt Count  301 K/uL (130-400)   06/15/17  07:09    


 


MPV  7.9 fL (7.2-11.7)   06/15/17  07:09    


 


Neut % (Auto)  75.3 % (50.0-75.0)  H  06/15/17  07:09    


 


Lymph % (Auto)  12.7 % (20.0-40.0)  L  06/15/17  07:09    


 


Mono % (Auto)  10.2 % (0.0-10.0)  H  06/15/17  07:09    


 


Eos % (Auto)  0.9 % (0.0-4.0)   06/15/17  07:09    


 


Baso % (Auto)  0.9 % (0.0-2.0)   06/15/17  07:09    


 


Neut #  6.3 K/uL (1.8-7.0)   06/15/17  07:09    


 


Lymph #  1.1 K/uL (1.0-4.3)   06/15/17  07:09    


 


Mono #  0.8 K/uL (0.0-0.8)   06/15/17  07:09    


 


Eos #  0.1 K/uL (0.0-0.7)   06/15/17  07:09    


 


Baso #  0.1 K/uL (0.0-0.2)   06/15/17  07:09    


 


PT  14.8 SECONDS (9.7-12.2)  H  06/15/17  07:09    


 


INR  1.3   06/15/17  07:09    


 


APTT  28 SECONDS (21-34)   06/15/17  07:09    


 


Sodium  135 mmol/L (132-148)   06/15/17  07:09    


 


Potassium  3.7 mmol/L (3.6-5.2)   06/15/17  07:09    


 


Chloride  101 mmol/L ()   06/15/17  07:09    


 


Carbon Dioxide  23 mmol/L (22-30)   06/15/17  07:09    


 


Anion Gap  15  (10-20)   06/15/17  07:09    


 


BUN  21 mg/dL (9-20)  H  06/15/17  07:09    


 


Creatinine  1.6 MG/DL (0.8-1.5)  H  06/15/17  07:09    


 


Est GFR ( Amer)  52   06/15/17  07:09    


 


Est GFR (Non-Af Amer)  43   06/15/17  07:09    


 


POC Glucose (mg/dL)  105 mg/dL ()   06/15/17  11:11    


 


Random Glucose  107 mg/dL ()   06/15/17  07:09    


 


Hemoglobin A1c  6.1 % (4.2-6.5)   06/14/17  19:06    


 


Calcium  7.4 mg/dl (8.6-10.4)  L  06/15/17  07:09    


 


Magnesium  1.6 mg/dL (1.6-2.3)   06/15/17  07:09    


 


Total Bilirubin  0.6 mg/dL (0.2-1.3)   06/15/17  07:09    


 


AST  16 U/L (17-59)  L D 06/15/17  07:09    


 


ALT  20 U/L (21-72)  L D 06/15/17  07:09    


 


Alkaline Phosphatase  60 U/L ()   06/15/17  07:09    


 


Total Protein  6.6 g/dL (6.3-8.3)   06/15/17  07:09    


 


Albumin  2.9 g/dL (3.5-5.0)  L D 06/15/17  07:09    


 


Globulin  3.6 gm/dL (2.2-3.9)   06/15/17  07:09    


 


Albumin/Globulin Ratio  0.8  (1.0-2.1)  L  06/15/17  07:09    


 


Triglycerides  65 mg/dL (0-149)   06/15/17  07:09    


 


Cholesterol  127 mg/dL (0-199)   06/15/17  07:09    


 


LDL Cholesterol Direct  71 mg/dL (0-129)   06/15/17  07:09    


 


HDL Cholesterol  34 mg/dL (30-70)   06/15/17  07:09    


 


Urine Color  Yellow  (YELLOW)   06/14/17  15:12    


 


Urine Clarity  Hazy  (Clear)   06/14/17  15:12    


 


Urine pH  5.0  (5.0-8.0)   06/14/17  15:12    


 


Ur Specific Gravity  1.013  (1.003-1.030)   06/14/17  15:12    


 


Urine Protein  1+ mg/dL (NEGATIVE)  H  06/14/17  15:12    


 


Urine Glucose (UA)  Normal mg/dL (Normal)   06/14/17  15:12    


 


Urine Ketones  Negative mg/dL (NEGATIVE)   06/14/17  15:12    


 


Urine Blood  2+  (NEGATIVE)  H  06/14/17  15:12    


 


Urine Nitrate  Positive  (NEGATIVE)  H  06/14/17  15:12    


 


Urine Bilirubin  Negative  (NEGATIVE)   06/14/17  15:12    


 


Urine Urobilinogen  Normal mg/dL (0.2-1.0)   06/14/17  15:12    


 


Ur Leukocyte Esterase  3+ Candis/uL (Negative)  H  06/14/17  15:12    


 


Urine WBC (Auto)  152 /hpf (0-5)  H  06/14/17  15:12    


 


Urine RBC (Auto)  14 /hpf (0-3)  H  06/14/17  15:12    


 


Ur Squamous Epith Cells  < 1 /hpf (0-5)   06/14/17  15:12    


 


Urine Bacteria  Occ  (<OCC)  H  06/14/17  15:12    


 


Blood Type  O POSITIVE   06/15/17  07:09    


 


Antibody Screen  Negative   06/15/17  07:09    














- Hospital Course


Hospital Course: 





On admission:


Patient is a 66 year old male with history of hypertension, coronary artery 

disease, and diabetes who presents to the emergency room for swelling and 

intermittent bleeding to right inguinal hernia repair site. Patient reportedly 

had a right inguinal hernia repair performed at [facility] on 5/28/17. Patient states 

he initially went to [facility] for "colon inflammation" and he was told during 

admission he would need hernia repair. Patient states he did not want to have 

surgery but "eventually was convinced by the team." He states following the 

surgery, there was complication of acute abdominal bleeding. Patient notes he 

immediately felt discomfort to the surgical site and "knew something was wrong.

" He reports he was informed he would need physical therapy but refused. 

Patient states he was properly discharged but was not given prescriptions or 

instruction regarding when his indwelling dalal should be removed. Patient 

reports since being home, he developed bleeding from the surgical site, 

swelling and pain. He went to [facility] approximately one week to be re-

evaluated and was told he has a hematoma and would again require surgery. At 

this time, his dalal was attempted to be removed, however, patient states they 

were unsuccessful due to complication of bleeding. Patient ultimately left 

[facility] as he did not want surgery. Today he was brought into the ED 

by his family as he developed fevers at home. Patient also reports dizziness 

causing him difficulty to walk. He denies chest pain, palpitations, shortness 

of breath, nausea, vomiting, diarrhea, constipation, and lower extremity 

swelling.





Hospital course: 


Pt admitted on 6/14/17 for swelling bleeding at prior surgical site of right 

inguinal hernia. CT A/P showed right inguinal hernia with fluid collection and 

possible abscess, also left inguinal hernia containing fat (summary, See full 

report). Pt presenting with long-term indwelling catheter. He was given Rocephin

/Clindamycin in ED. Surgery, Dr. Dillon was consulted, as well as Dr. Du

, the urologist.  Urine and blood cultures ordered. Pt was given Kayexalate to 

treat hyperkalemia on initial labs in ED. Morphine given for pain. Plan by 

surgery was removal of infected mesh. Pt stated he was only willing to have the 

surgery if the surgeon agreed to testify, or provide affadavit, saying that 

previous surgeon from original operation at [facility] has committed malpractice. Pt 

stated that all surgeons "were against him" and would cover for each other. 

This resident, Sonia from patient access, Xiomy from case management, and Dr. Dillon, earlier in the day, all attempted to talk to the patient about his 

goals. The pt reiterated that the only way he would consent to surgery was if 

surgeon was willing to testify against original surgeon at [facility]. Pt elected to 

leave AMA, despite risks being explained to him. 





Chronic condition of HTN, CAD, DM, treated with home meds on this admission. 





(The above is only a summary of pts course, and should not be misunderstood as 

a complete history of events - please see patient record for more detail)





Discharge Exam





- Head Exam


Head Exam: ATRAUMATIC, NORMAL INSPECTION, NORMOCEPHALIC





- Eye Exam


Eye Exam: EOMI, PERRL.  absent: Scleral icterus





- ENT Exam


ENT Exam: Mucous Membranes Moist





- Neck Exam


Neck exam: Normal Inspection





- Respiratory Exam


Respiratory Exam: Clear to PA & Lateral, NORMAL BREATHING PATTERN.  absent: 

Accessory Muscle Use, Rales, Rhonchi, Wheezes





- Cardiovascular Exam


Cardiovascular Exam: REGULAR RHYTHM, +S1, +S2





- GI/Abdominal Exam


GI & Abdominal Exam: Soft, Tenderness (RLQ at site of hernia repair)


Additional comments: 





surgical laceration site with dehiscence and light erythema. Small amount of 

purulent drainage noted. Tender to palpation





-  Exam


Additional comments: 





dalal catheter noted





- Extremities Exam


Extremities exam: normal inspection, pedal pulses present





- Neurological Exam


Neurological exam: Alert, Oriented x3





- Psychiatric Exam


Psychiatric exam: Agitated, Normal Affect





- Skin


Skin Exam: Diaphoretic, Normal Color





Discharge Plan





- Follow Up Plan


Condition: GOOD


Disposition: AGAINST MEDICAL ADVICE

## 2017-06-15 NOTE — CP.PCM.PN
Subjective





- Date & Time of Evaluation


Date of Evaluation: 06/15/17


Time of Evaluation: 13:10





- Subjective


Subjective: 





Patient seen and examined at bedside for AMA.  Dr. Dillon, Dr. Saucedo and 

Kat all tried to convince patient to stay.  Patient does not want to stay 

because he feels all the surgeons are trying to harm him.  He thinks the 

surgeon at [facility] did something wrong.  He is leaving because Dr. Dillon will 

not write a report saying the other surgeon was wrong.  The other surgeon did 

everything right, but patient needs the mesh out.  





Patient wants to leave the hospital.  This action is against my medical advice. 

This decision was made with informed refusal. The patient was told that staying 

in the hospital is necessary.  Explanation of the reasons why were discussed.  


The risks of leaving were explained to the patient and include, but are not 

limited to infection, sepsis and death. 


The patient has the capacity to make this informed decision and understands my 

explanation of the current medical problem and risks of leaving. 


The patient voluntarily accepts these risks and signed an AMA form documenting 

our conversation.


The patient was given the opportunity to ask questions and reconsider.  The 

patient was encouraged to return to the Emergency Department at any time for 

further care.





Objective





- Vital Signs/Intake and Output


Vital Signs (last 24 hours): 


 











Temp Pulse Resp BP Pulse Ox


 


 98.7 F   70   20   148/90   96 


 


 06/15/17 08:16  06/15/17 08:16  06/15/17 08:16  06/15/17 08:16  06/15/17 08:16








Intake and Output: 


 











 06/15/17 06/15/17





 06:59 18:59


 


Intake Total 500 1000


 


Output Total 500 250


 


Balance 0 750














- Medications


Medications: 


 Current Medications





Enalapril Maleate (Vasotec)  5 mg PO DAILY [facility]


   Last Admin: 06/15/17 11:30 Dose:  Not Given


Piperacillin Sod/Tazobactam Sod (Zosyn 2.25 Gm Iv Premix)  2.25 gm in 50 mls @ 

100 mls/hr IVPB Q6H [facility]


   Last Admin: 06/15/17 06:16 Dose:  100 mls/hr


Sodium Chloride (Sodium Chloride 0.9%)  1,000 mls @ 125 mls/hr IV .Q8H [facility]


   Last Admin: 06/15/17 06:19 Dose:  125 mls/hr


Metoprolol Tartrate (Lopressor)  25 mg PO DAILY [facility]


   Last Admin: 06/15/17 11:30 Dose:  Not Given


Morphine Sulfate (Morphine)  2 mg IVP Q4 PRN


   PRN Reason: Pain, Mild (1-3)


Pantoprazole Sodium (Protonix Inj)  40 mg IVP DAILY [facility]


   Last Admin: 06/15/17 10:27 Dose:  40 mg


Rosuvastatin Calcium (Crestor)  20 mg PO HS [facility]


   Last Admin: 06/14/17 21:29 Dose:  20 mg


Tamsulosin HCl (Flomax)  0.4 mg PO BID [facility]


   Last Admin: 06/15/17 11:29 Dose:  Not Given











- Labs


Labs: 


 





 06/15/17 07:09 





 06/15/17 07:09 





 











PT  14.8 SECONDS (9.7-12.2)  H  06/15/17  07:09    


 


INR  1.3   06/15/17  07:09    


 


APTT  28 SECONDS (21-34)   06/15/17  07:09

## 2017-06-15 NOTE — RAD
PROCEDURE:  CHEST RADIOGRAPH, 1 VIEW



HISTORY:

admission, fever



COMPARISON:

09/28/2016



FINDINGS:



LUNGS:

Diffuse increased interstitial lung markings.  Biapical pleural 

thickening with upper lobe granulomatous changes.  Bilateral hilar 

prominence.  Left midlung consolidative changes which may represent 

atelectasis.



PLEURA:

As above.



CARDIOVASCULAR:

Cardiomegaly.  Tortuous aorta.



OSSEOUS STRUCTURES:

Degenerative changes in the spine and shoulders.



VISUALIZED UPPER ABDOMEN:

Normal.



OTHER FINDINGS:

None. 



IMPRESSION:

Diffuse increased interstitial lung markings.  Biapical pleural 

thickening with upper lobe granulomatous changes.  Bilateral hilar 

prominence.  Left midlung consolidative changes which may represent 

atelectasis.

## 2017-07-19 ENCOUNTER — HOSPITAL ENCOUNTER (INPATIENT)
Dept: HOSPITAL 14 - H.ER | Age: 67
LOS: 1 days | Discharge: LEFT BEFORE DISCHARGE | DRG: 121 | End: 2017-07-20
Attending: INTERNAL MEDICINE | Admitting: INTERNAL MEDICINE
Payer: MEDICAID

## 2017-07-19 VITALS — BODY MASS INDEX: 30.1 KG/M2

## 2017-07-19 DIAGNOSIS — N39.0: ICD-10-CM

## 2017-07-19 DIAGNOSIS — I25.10: ICD-10-CM

## 2017-07-19 DIAGNOSIS — R80.9: ICD-10-CM

## 2017-07-19 DIAGNOSIS — E11.22: ICD-10-CM

## 2017-07-19 DIAGNOSIS — E87.6: ICD-10-CM

## 2017-07-19 DIAGNOSIS — I21.4: Primary | ICD-10-CM

## 2017-07-19 DIAGNOSIS — M10.9: ICD-10-CM

## 2017-07-19 DIAGNOSIS — E78.5: ICD-10-CM

## 2017-07-19 DIAGNOSIS — R33.8: ICD-10-CM

## 2017-07-19 DIAGNOSIS — D50.9: ICD-10-CM

## 2017-07-19 DIAGNOSIS — I25.2: ICD-10-CM

## 2017-07-19 DIAGNOSIS — I13.0: ICD-10-CM

## 2017-07-19 DIAGNOSIS — N18.3: ICD-10-CM

## 2017-07-19 DIAGNOSIS — N40.1: ICD-10-CM

## 2017-07-19 DIAGNOSIS — I50.22: ICD-10-CM

## 2017-07-19 LAB
ALBUMIN/GLOB SERPL: 0.9 {RATIO} (ref 1–2.1)
ALP SERPL-CCNC: 57 U/L (ref 38–126)
ALT SERPL-CCNC: 25 U/L (ref 21–72)
APTT BLD: 30 SECONDS (ref 25.6–37.1)
AST SERPL-CCNC: 21 U/L (ref 17–59)
BACTERIA #/AREA URNS HPF: (no result) /[HPF]
BASOPHILS # BLD AUTO: 0.1 K/UL (ref 0–0.2)
BASOPHILS NFR BLD: 0.9 % (ref 0–2)
BILIRUB SERPL-MCNC: 0.3 MG/DL (ref 0.2–1.3)
BILIRUB UR-MCNC: NEGATIVE MG/DL
BUN SERPL-MCNC: 23 MG/DL (ref 9–20)
BUN SERPL-MCNC: 26 MG/DL (ref 9–20)
CALCIUM SERPL-MCNC: 7.8 MG/DL (ref 8.4–10.2)
CALCIUM SERPL-MCNC: 7.9 MG/DL (ref 8.4–10.2)
CHLORIDE SERPL-SCNC: 109 MMOL/L (ref 98–107)
CHLORIDE SERPL-SCNC: 111 MMOL/L (ref 98–107)
CO2 SERPL-SCNC: 20 MMOL/L (ref 22–30)
CO2 SERPL-SCNC: 22 MMOL/L (ref 22–30)
COLOR UR: YELLOW
EOSINOPHIL # BLD AUTO: 0.2 K/UL (ref 0–0.7)
EOSINOPHIL NFR BLD: 3.5 % (ref 0–4)
ERYTHROCYTE [DISTWIDTH] IN BLOOD BY AUTOMATED COUNT: 15 % (ref 11.5–14.5)
GLOBULIN SER-MCNC: 3.6 GM/DL (ref 2.2–3.9)
GLUCOSE SERPL-MCNC: 103 MG/DL (ref 75–110)
GLUCOSE SERPL-MCNC: 143 MG/DL (ref 75–110)
GLUCOSE UR STRIP-MCNC: (no result) MG/DL
HCT VFR BLD CALC: 27 % (ref 35–51)
IRON SERPL-MCNC: 36 UG/DL (ref 49–181)
KETONES UR STRIP-MCNC: NEGATIVE MG/DL
LEUKOCYTE ESTERASE UR-ACNC: (no result) LEU/UL
LYMPHOCYTES # BLD AUTO: 1.2 K/UL (ref 1–4.3)
LYMPHOCYTES NFR BLD AUTO: 17.7 % (ref 20–40)
MAGNESIUM SERPL-MCNC: 1.3 MG/DL (ref 1.6–2.3)
MCH RBC QN AUTO: 25.7 PG (ref 27–31)
MCHC RBC AUTO-ENTMCNC: 33.5 G/DL (ref 33–37)
MCV RBC AUTO: 76.7 FL (ref 80–94)
MONOCYTES # BLD: 0.6 K/UL (ref 0–0.8)
MONOCYTES NFR BLD: 9.5 % (ref 0–10)
NEUTROPHILS # BLD: 4.6 K/UL (ref 1.8–7)
NEUTROPHILS NFR BLD AUTO: 68.4 % (ref 50–75)
NRBC BLD AUTO-RTO: 0.1 % (ref 0–0)
PH UR STRIP: 6 [PH] (ref 5–8)
PHOSPHATE SERPL-MCNC: 4.1 MG/DL (ref 2.5–4.5)
PLATELET # BLD: 378 K/UL (ref 130–400)
PMV BLD AUTO: 7.6 FL (ref 7.2–11.7)
POTASSIUM SERPL-SCNC: 3.3 MMOL/L (ref 3.6–5)
POTASSIUM SERPL-SCNC: 3.7 MMOL/L (ref 3.6–5)
PROT SERPL-MCNC: 6.8 G/DL (ref 6.3–8.2)
PROT UR STRIP-MCNC: >=500 MG/DL
RBC # UR STRIP: NEGATIVE /UL
RBC #/AREA URNS HPF: 32 /HPF (ref 0–3)
SODIUM SERPL-SCNC: 142 MMOL/L (ref 132–148)
SODIUM SERPL-SCNC: 142 MMOL/L (ref 132–148)
SP GR UR STRIP: 1.02 (ref 1–1.03)
TROPONIN I SERPL-MCNC: 0.14 NG/ML (ref 0–0.12)
UROBILINOGEN UR-MCNC: (no result) MG/DL (ref 0.2–1)
WBC # BLD AUTO: 6.8 K/UL (ref 4.8–10.8)
WBC #/AREA URNS HPF: 165 /HPF (ref 0–5)

## 2017-07-19 RX ADMIN — ENOXAPARIN SODIUM SCH MG: 80 INJECTION SUBCUTANEOUS at 21:01

## 2017-07-19 RX ADMIN — ENOXAPARIN SODIUM SCH MG: 80 INJECTION SUBCUTANEOUS at 11:24

## 2017-07-19 RX ADMIN — Medication SCH MG: at 11:24

## 2017-07-19 RX ADMIN — Medication SCH MG: at 17:20

## 2017-07-19 RX ADMIN — ACETYLCYSTEINE SCH: 200 INHALANT RESPIRATORY (INHALATION) at 19:19

## 2017-07-19 NOTE — CP.PCM.HP
<Jayesh Lala - Last Filed: 07/19/17 11:20>





History of Present Illness





- History of Present Illness


History of Present Illness: 





67 y/o male with a PMHx that includes HTN, NIDDM2, CAD, an indwelling Funez 

insertion s/p Right Inguinal Hernia Repair, gout, and insomnia presented via 

EMS with CC of chest pain. Early this morning around 1am, he went to the 

bathroom and the pain began. Pt reports that pain is located substernally, 7/10

, pressure like, nonradiating, and associated with dyspnea for the past 3 days. 

Reports pain on exertion on the point that he could not go from room to room 

without rest. Pain is alleviated with rest and does not take Nitro. Denies 

associated diaphoresis, nausea, vomiting, left arm/jaw pain. States he has been 

having similar episodic pain for the past several months that has been 

worsening. Reports he had a stress test two years ago that he passed, was 

prescribed a bunch of medications from a cardiologist ([place]. Ras[place] [facility] [place] and has 

not followed up since. No other complaints. Denies fever/chills, headaches, 

changes in vision, N/V/D/C, urinary symptoms, numbness/tingling, calf pain.





ROS: 12 pts reviewed, found to be negative 





PMD: none


PMHx: as per HPI


Meds: many types of medications provided, as well as a list of several 

pharmacies, will be in touch to verify which meds pt is currently on.


PsurgHx: R inguinal hernia repair with unknown complication required indwelling 

catheter


ALL: NKDA


Social: Denies ETOH, tobacco, drug abuse. Retired. Lives at home alone.


FamilyHx: noncontributory





ED COURSE:


EKG: ST Depression in V2, V3, 1st degree AV block, Q waves in III and aVF


CBC


CMP


BNP: positive


Troponin: positive (0.1360)


CXR





Admitted to ICU for NSTEMI/CHF





Present on Admission





- Present on Admission


Any Indicators Present on Admission: No


Urinary Catheter: Yes (indwelling catheter )





Review of Systems





- Review of Systems


All systems: reviewed and no additional remarkable complaints except





- Constitutional


Constitutional: As Per HPI





Past Patient History





- Past Medical History & Family History


Past Medical History?: Yes





- Past Social History


Smoking Status: Never Smoked


Alcohol: None


Drugs: Denies


Home Situation {Lives}: Alone


Domestic Violence: Negative





- CARDIAC


Hx Cardiac Disorders: Yes





- PULMONARY


Hx Respiratory Disorders: No





- HEENT


Hx HEENT Problems: No





- RENAL


Hx Chronic Kidney Disease: Yes





- ENDOCRINE/METABOLIC


Hx Diabetes Mellitus Type 2: Yes





- HEMATOLOGICAL/ONCOLOGICAL


Hx Blood Disorders: No


Hx AIDS: No


Hx Human Immunodeficiency Virus (HIV): No





- INTEGUMENTARY


Hx Dermatological Problems: No





- MUSCULOSKELETAL/RHEUMATOLOGICAL


Hx Falls: No


Hx Gout: Yes





- GASTROINTESTINAL


Hx Constipation: Yes





- GENITOURINARY/GYNECOLOGICAL


Hx Genitourinary Disorders: Yes





- PSYCHIATRIC


Hx Substance Use: No





- SURGICAL HISTORY


Hx Surgeries: Yes


Other/Comment: post inguinal  hernia





- ANESTHESIA


Hx Anesthesia: Yes


Hx Anesthesia Reactions: No


Hx Malignant Hyperthermia: No





Meds


Allergies/Adverse Reactions: 


 Allergies











Allergy/AdvReac Type Severity Reaction Status Date / Time


 


No Known Allergies Allergy   Verified 06/14/17 14:21














Physical Exam





- Constitutional


Appears: Non-toxic, No Acute Distress





- Head Exam


Head Exam: ATRAUMATIC, NORMOCEPHALIC





- Eye Exam


Eye Exam: EOMI.  absent: Conjunctival injection, Scleral icterus


Pupil Exam: PERRL





- ENT Exam


ENT Exam: Mucous Membranes Moist





- Neck Exam


Neck exam: Positive for: Full Rom, Normal Inspection.  Negative for: 

Lymphadenopathy, Tenderness





- Respiratory Exam


Respiratory Exam: Clear to Auscultation Bilateral, NORMAL BREATHING PATTERN.  

absent: Accessory Muscle Use, Rales, Rhonchi, Wheezes





- Cardiovascular Exam


Cardiovascular Exam: REGULAR RHYTHM, RRR, +S1, +S2.  absent: Tachycardia, 

Diastolic murmur, Gallop, JVD, Rubs, Systolic Murmur





- GI/Abdominal Exam


GI & Abdominal Exam: Normal Bowel Sounds, Soft.  absent: Distended, Firm, 

Guarding, Rebound, Rigid, Tenderness





- Extremities Exam


Extremities exam: Positive for: normal capillary refill, normal inspection, 

pedal pulses present.  Negative for: calf tenderness, pedal edema, tenderness





- Back Exam


Back exam: NORMAL INSPECTION





- Neurological Exam


Neurological exam: Alert, CN II-XII Intact, Oriented x3, Reflexes Normal





- Psychiatric Exam


Psychiatric exam: Normal Affect, Normal Mood





- Skin


Skin Exam: Dry, Intact, Normal Color, Warm





Results





- Vital Signs


Recent Vital Signs: 





 Last Vital Signs











Temp  98.5 F   07/19/17 08:27


 


Pulse  59 L  07/19/17 08:51


 


Resp  20   07/19/17 08:27


 


BP  165/84 H  07/19/17 08:51


 


Pulse Ox  100   07/19/17 08:27














- Labs


Result Diagrams: 


 07/19/17 03:55





 07/19/17 09:55


Labs: 





 Laboratory Results - last 24 hr











  07/19/17 07/19/17 07/19/17





  03:55 03:55 03:55


 


WBC   6.8 


 


RBC   3.52 L 


 


Hgb   9.0 L D 


 


Hct   27.0 L 


 


MCV   76.7 L D 


 


MCH   25.7 L 


 


MCHC   33.5 


 


RDW   15.0 H 


 


Plt Count   378 


 


MPV   7.6 


 


Neut % (Auto)   68.4 


 


Lymph % (Auto)   17.7 L 


 


Mono % (Auto)   9.5 


 


Eos % (Auto)   3.5 


 


Baso % (Auto)   0.9 


 


Neut #   4.6 


 


Lymph #   1.2 


 


Mono #   0.6 


 


Eos #   0.2 


 


Baso #   0.1 


 


PT    14.3 H


 


INR    1.3 H


 


APTT    30.0


 


Sodium  142  


 


Potassium  3.3 L  


 


Chloride  111 H  


 


Carbon Dioxide  20 L  


 


Anion Gap  14  


 


BUN  26 H  


 


Creatinine  1.5  


 


Est GFR ( Amer)  56  


 


Est GFR (Non-Af Amer)  47  


 


POC Glucose (mg/dL)   


 


Random Glucose  103  


 


Calcium  7.8 L  


 


Phosphorus   


 


Magnesium   


 


Total Bilirubin  0.3  


 


AST  21  


 


ALT  25  


 


Alkaline Phosphatase  57  


 


Troponin I  0.1360 H*  


 


NT-Pro-B Natriuret Pep  3380 H  


 


Total Protein  6.8  


 


Albumin  3.3 L  


 


Globulin  3.6  


 


Albumin/Globulin Ratio  0.9 L  


 


Urine Color   


 


Urine Clarity   


 


Urine pH   


 


Ur Specific Gravity   


 


Urine Protein   


 


Urine Glucose (UA)   


 


Urine Ketones   


 


Urine Blood   


 


Urine Nitrate   


 


Urine Bilirubin   


 


Urine Urobilinogen   


 


Ur Leukocyte Esterase   


 


Urine RBC (Auto)   


 


Urine Microscopic WBC   


 


Ur Squamous Epith Cells   


 


Urine Bacteria   














  07/19/17 07/19/17 07/19/17





  04:05 07:32 09:55


 


WBC   


 


RBC   


 


Hgb   


 


Hct   


 


MCV   


 


MCH   


 


MCHC   


 


RDW   


 


Plt Count   


 


MPV   


 


Neut % (Auto)   


 


Lymph % (Auto)   


 


Mono % (Auto)   


 


Eos % (Auto)   


 


Baso % (Auto)   


 


Neut #   


 


Lymph #   


 


Mono #   


 


Eos #   


 


Baso #   


 


PT   


 


INR   


 


APTT   


 


Sodium    142


 


Potassium    3.7


 


Chloride    109 H


 


Carbon Dioxide    22


 


Anion Gap    15


 


BUN    23 H


 


Creatinine    1.4


 


Est GFR ( Amer)    > 60


 


Est GFR (Non-Af Amer)    50


 


POC Glucose (mg/dL)   117 H 


 


Random Glucose    143 H


 


Calcium    7.9 L


 


Phosphorus    4.1


 


Magnesium    1.3 L


 


Total Bilirubin   


 


AST   


 


ALT   


 


Alkaline Phosphatase   


 


Troponin I    1.2300 H*


 


NT-Pro-B Natriuret Pep   


 


Total Protein   


 


Albumin   


 


Globulin   


 


Albumin/Globulin Ratio   


 


Urine Color  Yellow  


 


Urine Clarity  Cloudy  


 


Urine pH  6.0  


 


Ur Specific Gravity  1.016  


 


Urine Protein  >=500  


 


Urine Glucose (UA)  Neg  


 


Urine Ketones  Negative  


 


Urine Blood  Negative  


 


Urine Nitrate  Negative  


 


Urine Bilirubin  Negative  


 


Urine Urobilinogen  0.2-1.0  


 


Ur Leukocyte Esterase  Mod  


 


Urine RBC (Auto)  32 H  


 


Urine Microscopic WBC  165 H  


 


Ur Squamous Epith Cells  < 1  


 


Urine Bacteria  Occ H  














Assessment & Plan


(1) NSTEMI (non-ST elevated myocardial infarction)


Assessment and Plan: 


first troponin Positive


Heparin Drip


Nitropaste 1inch PRN 


Metoprolol 25mg BID


Atorvastatin 40mg QD


ST Depression on EKG


f/u EKG


Will trend Troponin 


Cardiology consult as per Dr. SEAN Martel: pt to go to [facility] for Cardiac 

Cath





Status: Acute   Priority: High   





(2) Acute heart failure


Assessment and Plan: 


positive Pro-Bnp


Unknown etiology of HF, ordered echo for further evaluation 


Status: Acute   Priority: High   





(3) Hypertension


Assessment and Plan: 


uncertain of home meds


-Enalapril 5mg QD


-Metoprolol 25mg BID


Status: Acute   





(4) UTI (urinary tract infection)


Assessment and Plan: 


started on Rocephin 1gm QD


Urology consult for indwelling cath 


Status: Acute   





(5) Type 2 diabetes mellitus


Assessment and Plan: 


uncertain of medications


accuchecks


insulin coverage scale


f/u HbA1C


Status: Acute   





(6) DVT prophylaxis


Assessment and Plan: 


currently on Heparin drip 


Status: Acute   





<Brenden Rascon K - Last Filed: 07/20/17 16:36>





Results





- Vital Signs


Recent Vital Signs: 





 Last Vital Signs











Temp  97.4 F L  07/20/17 08:00


 


Pulse  74   07/20/17 08:00


 


Resp  21   07/20/17 08:00


 


BP  162/84 H  07/20/17 08:00


 


Pulse Ox  98   07/20/17 08:00














- Labs


Result Diagrams: 


 07/20/17 04:20





 07/20/17 04:20


Labs: 





 Laboratory Results - last 24 hr











  07/19/17 07/19/17 07/19/17





  09:55 16:48 17:31


 


WBC   


 


RBC   


 


Hgb   


 


Hct   


 


MCV   


 


MCH   


 


MCHC   


 


RDW   


 


Plt Count   


 


Sodium   


 


Potassium   


 


Chloride   


 


Carbon Dioxide   


 


Anion Gap   


 


BUN   


 


Creatinine   


 


Est GFR ( Amer)   


 


Est GFR (Non-Af Amer)   


 


POC Glucose (mg/dL)   111 H 


 


Random Glucose   


 


Hemoglobin A1c  6.5  


 


Calcium   


 


Total Bilirubin   


 


AST   


 


ALT   


 


Alkaline Phosphatase   


 


Troponin I    1.3900 H*


 


Total Protein   


 


Albumin   


 


Globulin   


 


Albumin/Globulin Ratio   


 


Hep Bs Antibody   


 


Hepatitis C Antibody   














  07/19/17 07/20/17 07/20/17





  21:41 04:20 04:20


 


WBC   7.6 


 


RBC   3.86 L 


 


Hgb   9.5 L 


 


Hct   29.9 L 


 


MCV   77.4 L 


 


MCH   24.5 L 


 


MCHC   31.7 L 


 


RDW   14.9 H 


 


Plt Count   366 


 


Sodium    143


 


Potassium    3.3 L


 


Chloride    111 H


 


Carbon Dioxide    22


 


Anion Gap    13


 


BUN    17


 


Creatinine    1.3


 


Est GFR ( Amer)    > 60


 


Est GFR (Non-Af Amer)    55


 


POC Glucose (mg/dL)  99  


 


Random Glucose    106


 


Hemoglobin A1c   


 


Calcium    8.0 L


 


Total Bilirubin    0.2


 


AST    25


 


ALT    27


 


Alkaline Phosphatase    63


 


Troponin I   


 


Total Protein    6.7


 


Albumin    3.2 L


 


Globulin    3.5


 


Albumin/Globulin Ratio    0.9 L


 


Hep Bs Antibody   


 


Hepatitis C Antibody   














  07/20/17 07/20/17 07/20/17





  04:20 04:20 05:34


 


WBC   


 


RBC   


 


Hgb   


 


Hct   


 


MCV   


 


MCH   


 


MCHC   


 


RDW   


 


Plt Count   


 


Sodium   


 


Potassium   


 


Chloride   


 


Carbon Dioxide   


 


Anion Gap   


 


BUN   


 


Creatinine   


 


Est GFR ( Amer)   


 


Est GFR (Non-Af Amer)   


 


POC Glucose (mg/dL)    101


 


Random Glucose   


 


Hemoglobin A1c   


 


Calcium   


 


Total Bilirubin   


 


AST   


 


ALT   


 


Alkaline Phosphatase   


 


Troponin I   


 


Total Protein   


 


Albumin   


 


Globulin   


 


Albumin/Globulin Ratio   


 


Hep Bs Antibody   Negative 


 


Hepatitis C Antibody  Negative  














Assessment & Plan





- Assessment and Plan (Free Text)


Assessment: 


Patient was personally seen and examined by me in rounds with residents.


Available labs and diagnostic data reviewed.


Case, patient's condition and management plan discussed with residents in 

rounds.


Agree with resident's progress note.


Plan: As ordered.

## 2017-07-19 NOTE — CARD
--------------- APPROVED REPORT --------------





EKG Measurement

Heart [id number]

CO [id number]

[id number]

[id number]

[id number]



<Conclusion>

Sinus rhythm with 1st degree AV block

Possible Left atrial enlargement

Left axis deviation

Inferior infarct, age undetermined

Cannot rule out Anterior infarct, age undetermined

Abnormal ECG

## 2017-07-19 NOTE — CP.PCM.CON
History of Present Illness





- History of Present Illness


History of Present Illness: 





Hospitalist Covering the ICU Consult Note (Patient was seen and examined at 5 

AM ER Bed #21)





66 year old male (PMHx: HTN, CAD, DM 2, Indwelling Funez S/P Right Inguinal 

Hernia Repair, Gout, Insomnia) who presents via EMS with a chief complaint of 

Chest Pain. Patient states that he has been experiencing substernal chest pain, 

that is pressure like, nonradiating, and associated with dyspnea for the past 3 

days. He states that he could not walk from one room to the next without 

experiencing this pain. However, if resting he would not feel the pain. Then 

early this morning around 2 AM, after he got back to his room the pain occurred 

as described but would not go away even after got back into bed. Because of 

this he became concerning and called EMS and they brought him here.





Currently upon FULL ROS there is NO chest pain while he is resting in the ER 

stretcher, NO palpitations, NO SOB/Cough/Wheezing, NO dysphagia/odynophagia, NO 

abdominal pain, NO n/v/d/c, NO burning with urination, NO lightheadedness/

dizziness, NO paresthesias, NO edema





PMHx: HTN, CAD, DM 2, Indwelling Funez Catheter, Gout, Insomnia


PSHx: Right Inguinal Hernia Repair


ALL: NKDA


Medications: Enalpril 5 mg PO 1x/day, Metoprolol 25 mg PO 2x/day, Crestor 20 mg 

PO 1x/day, ASA 81 mg PO 1x/day, Unspecified Gout Medicine, Unspecified Medicine 

to sleep: please note that the patient could not provide details of his 

medication list and information was obtained from review of records.


Social Hx: Retired [employer] , NO alcohol, NO tobacco, NO illicit drugs


Family Hx: patient states that there is nothing significant when asking about 

his Mom, Dad, and any other relatives.





Exam:


HEENT: NCA, EOMI, PERRLA, NO cervical lymphadenopathy, NO thyromegaly, NO 

pharyngeal erythema/exudate


Cardio: NS1 and NS2, NO M/R/G


Resp: CTA B/L, NO R/R/W


GI: BSx4, soft, NT, ND, NO HSM, NO rebound tenderness, NO guarding/rebound 

tenderness


Ext: Pulses are strong and equal, Capillary Refill is 2 seconds, NO edema


Neuro: CN II through XII are grossly intact


Rectal: good rectal tone, no gross visual lesions external were noted, Guaiac 

was Negative





Assessment and Plan:





1). NSTEMI


Elevated Troponin 0.1360 


Q waves in III and aVF, First Degree AV Block, Left Axis Deviation on EKG


Heparin Drip started in the ER


F/U Troponin at 10 AM and 4 PM


F/U further recommendations from Cardiologist Dr. SEAN Martel





2). HTN


Enalapril 5 mg PO 1x/day


Metoprolol 25 mg PO 2x/day





3). HLD


Crestor 20 mg PO 1x/day was changed to Atorvastatin 40 mg PO 1x/day





4). DM 2


Patient could not provide information on what medications he was taking for 

this issue


Regular Insulin Sliding Scale (Medium based upon weight)


Accuchecks


F/U HgBA1C





5). Possible UTI/Hx of Indwelling Funez


Patient states that Funez has been in place since Right Inguinal Hernia repair 

at [facility] [place] towards the end of May 2017 due to unspecified complication.


He states that he went to [facility] for correction of unspecified urinary 

issue and surgery was recommended but patient declined 


Records will have to be obtained by Primary Medicine Team from [facility] [place] to 

determine why Funez needed to be placed.


F/U Urine Culture


Started on Rocephin 1 gm IV Q24H considering the possibility of UTI


F/U further recommendations from Urologist Dr. SEAN Hernandez





6). Anemia Likely Secondary to Iron Deficiency


F/U Iron Studies at 10 AM 7/19/17





7). Hypokalemia


KDur 40 mEQ x 1 dose given PO 


F/U Mag, Phos, and BMP at 10 AM 7/19/17





8). Prophylaxis


Pepcid 20 mg PO 2x/day


Florastor 250 mg PO 2x/day


Already on Heparin Drip





Please note that the patient's pharmacies include [facility] in 

[place] and [facility] on [address] [facility]. I have spoken to the Family 

Medicine Resident covering the ICU to ask his help in contacting these 

pharmacies to determine the exact medications that the patient is taking.





Rico Oliver D.O.





Past Patient History





- Past Medical History & Family History


Past Medical History?: Yes





- Past Social History


Alcohol: None


Drugs: Denies





- CARDIAC


Hx Cardiac Disorders: Yes





- PULMONARY


Hx Respiratory Disorders: No





- RENAL


Hx Chronic Kidney Disease: Yes





- ENDOCRINE/METABOLIC


Hx Diabetes Mellitus Type 2: Yes





- MUSCULOSKELETAL/RHEUMATOLOGICAL


Hx Falls: No


Hx Gout: Yes





- GASTROINTESTINAL


Hx Constipation: Yes





- GENITOURINARY/GYNECOLOGICAL


Hx Genitourinary Disorders: Yes





- PSYCHIATRIC


Hx Substance Use: No





- SURGICAL HISTORY


Hx Surgeries: Yes


Other/Comment: post inguinal  hernia





- ANESTHESIA


Hx Anesthesia: Yes


Hx Anesthesia Reactions: No


Hx Malignant Hyperthermia: No





Meds


Allergies/Adverse Reactions: 


 Allergies











Allergy/AdvReac Type Severity Reaction Status Date / Time


 


No Known Allergies Allergy   Verified 06/14/17 14:21














- Medications


Medications: 


 Current Medications





Atorvastatin Calcium (Lipitor)  40 mg PO DAILY [facility]


Enalapril Maleate (Vasotec)  5 mg PO DAILY [facility]


Famotidine (Pepcid)  20 mg PO BID [facility]


Heparin Sodium/Dextrose (Heparin 25,000 Units/250ml In D5w)  25,000 units in 

250 mls @ 10 mls/hr IV .Q24H [facility]


   PRN Reason: Protocol


   Last Admin: 07/19/17 05:01 Dose:  10 mls/hr


Ceftriaxone Sodium 1 gm/ (Sodium Chloride)  100 mls @ 100 mls/hr IVPB DAILY [facility]


Insulin Human Regular (Humulin R)  0 units SC ACHS [facility]


   PRN Reason: Protocol


Metoprolol Tartrate (Lopressor)  25 mg PO Q12 [facility]


Saccharomyces Boulardii (Florastor)  250 mg PO BID [facility]











Results





- Vital Signs


Recent Vital Signs: 


 Last Vital Signs











Temp  98.1 F   07/19/17 05:09


 


Pulse  56 L  07/19/17 06:59


 


Resp  19   07/19/17 06:59


 


BP  130/63   07/19/17 06:59


 


Pulse Ox  100   07/19/17 06:23














- Labs


Result Diagrams: 


 07/19/17 03:55





 07/19/17 03:55


Labs: 


 Laboratory Results - last 24 hr











  07/19/17 07/19/17 07/19/17





  03:55 03:55 03:55


 


WBC   6.8 


 


RBC   3.52 L 


 


Hgb   9.0 L D 


 


Hct   27.0 L 


 


MCV   76.7 L D 


 


MCH   25.7 L 


 


MCHC   33.5 


 


RDW   15.0 H 


 


Plt Count   378 


 


MPV   7.6 


 


Neut % (Auto)   68.4 


 


Lymph % (Auto)   17.7 L 


 


Mono % (Auto)   9.5 


 


Eos % (Auto)   3.5 


 


Baso % (Auto)   0.9 


 


Neut #   4.6 


 


Lymph #   1.2 


 


Mono #   0.6 


 


Eos #   0.2 


 


Baso #   0.1 


 


PT    14.3 H


 


INR    1.3 H


 


APTT    30.0


 


Sodium  142  


 


Potassium  3.3 L  


 


Chloride  111 H  


 


Carbon Dioxide  20 L  


 


Anion Gap  14  


 


BUN  26 H  


 


Creatinine  1.5  


 


Est GFR ( Amer)  56  


 


Est GFR (Non-Af Amer)  47  


 


Random Glucose  103  


 


Calcium  7.8 L  


 


Total Bilirubin  0.3  


 


AST  21  


 


ALT  25  


 


Alkaline Phosphatase  57  


 


Troponin I  0.1360 H*  


 


NT-Pro-B Natriuret Pep  3380 H  


 


Total Protein  6.8  


 


Albumin  3.3 L  


 


Globulin  3.6  


 


Albumin/Globulin Ratio  0.9 L  


 


Urine Color   


 


Urine Clarity   


 


Urine pH   


 


Ur Specific Gravity   


 


Urine Protein   


 


Urine Glucose (UA)   


 


Urine Ketones   


 


Urine Blood   


 


Urine Nitrate   


 


Urine Bilirubin   


 


Urine Urobilinogen   


 


Ur Leukocyte Esterase   


 


Urine RBC (Auto)   


 


Urine Microscopic WBC   


 


Ur Squamous Epith Cells   


 


Urine Bacteria   














  07/19/17





  04:05


 


WBC 


 


RBC 


 


Hgb 


 


Hct 


 


MCV 


 


MCH 


 


MCHC 


 


RDW 


 


Plt Count 


 


MPV 


 


Neut % (Auto) 


 


Lymph % (Auto) 


 


Mono % (Auto) 


 


Eos % (Auto) 


 


Baso % (Auto) 


 


Neut # 


 


Lymph # 


 


Mono # 


 


Eos # 


 


Baso # 


 


PT 


 


INR 


 


APTT 


 


Sodium 


 


Potassium 


 


Chloride 


 


Carbon Dioxide 


 


Anion Gap 


 


BUN 


 


Creatinine 


 


Est GFR ( Amer) 


 


Est GFR (Non-Af Amer) 


 


Random Glucose 


 


Calcium 


 


Total Bilirubin 


 


AST 


 


ALT 


 


Alkaline Phosphatase 


 


Troponin I 


 


NT-Pro-B Natriuret Pep 


 


Total Protein 


 


Albumin 


 


Globulin 


 


Albumin/Globulin Ratio 


 


Urine Color  Yellow


 


Urine Clarity  Cloudy


 


Urine pH  6.0


 


Ur Specific Gravity  1.016


 


Urine Protein  >=500


 


Urine Glucose (UA)  Neg


 


Urine Ketones  Negative


 


Urine Blood  Negative


 


Urine Nitrate  Negative


 


Urine Bilirubin  Negative


 


Urine Urobilinogen  0.2-1.0


 


Ur Leukocyte Esterase  Mod


 


Urine RBC (Auto)  32 H


 


Urine Microscopic WBC  165 H


 


Ur Squamous Epith Cells  < 1


 


Urine Bacteria  Occ H

## 2017-07-19 NOTE — CARD
--------------- APPROVED REPORT --------------





EXAM: Two-dimensional and M-mode echocardiogram with Doppler, color 

Doppler with contrast.



Other Information 

Quality : AverageRhythm : NSR



INDICATION

Status/Post MI AMI



Echo Enhancing Agent

Indication: Endocardial border delineation

Agent/Amount Used: Definity



2D DIMENSIONS 

IVSd0.93   (0.7-1.1cm)LVDd5.45   (3.9-5.9cm)

LVOT Diameter2.10   (1.8-2.4cm)PWd1.04   (0.7-1.1cm)

IVSs1.10   (0.8-1.2cm)LVDs4.23   (2.5-4.0cm)

FS (%) 22.4   %PWs1.21   (0.8-1.2cm)



Aortic Valve

AoV Peak Xowywtmk507.1cm/sAoV VTI49.6cmAO Peak GR.19mmHg

LVOT Peak Ujmrllsu545.8cm/sLVOT VTI22.48cmAO Mean GR.11mmHg

MARCELO (VMAX)0.97ax6EBV (VTI)0.85cm2



Mitral Valve

MV E Nrxqyqin33.4cm/sMV DECEL [id number] A Yohivluh27.2cm/s

MV HHP15hlR/A ratio0.9MVA (PHT)3.36cm2



TDI

Lateral E' Peak V14.01cm/sMedial E' Peak V11.41cm/sE/Lateral 

E'6.4

E/Medial E'7.8



Tricuspid Valve

TR Peak Kssmznzb877ot/sRAP UPJPHIIH12veJhLA Peak Gr.23mmHg

[id number]



 LEFT VENTRICLE 

The left ventricle is normal size.

There is normal left ventricular wall thickness.

Left ventricle  is mildly impaired.

The Ejection Fraction is 45-50%.

Mild Apical hypokinesis

Transmitral Doppler flow pattern is Grade I-abnormal relaxation 

pattern.



 RIGHT VENTRICLE 

The right ventricle is normal size.

There is normal right ventricular wall thickness.

The right ventricular systolic function is normal.



 ATRIA 

The left atrium size is normal.

The right atrium size is normal.



 AORTIC VALVE 

The aortic valve is moderately sclerotic.

No aortic regurgitation is present.

There is mild valvular aortic stenosis.



 MITRAL VALVE 

The mitral valve is mildly thickened.

There is no mitral valve stenosis.

Mitral regurgitation is mild.



 TRICUSPID VALVE 

The tricuspid valve is normal in structure 

There is trace tricuspid regurgitation.



 PULMONIC VALVE 

The pulmonary valve is normal in structure and function.

There is no pulmonic valvular regurgitation. 



 GREAT VESSELS 

The aortic root is normal in size.

The IVC is normal in size and collapses >50% with inspiration.



 PERICARDIAL EFFUSION 

The pericardium appears normal.



<Conclusion>

The left ventricle is normal size.

There is normal left ventricular wall thickness.

Left ventricle  is mildly impaired.

The Ejection Fraction is 45-50%.

Mild Apical hypokinesis

Transmitral Doppler flow pattern is Grade I-abnormal relaxation 

pattern.

The aortic valve is moderately sclerotic.There is mild valvular 

aortic stenosis.

Mitral regurgitation is mild.

## 2017-07-19 NOTE — ED PDOC
HPI: Chest Pain


Time Seen by Provider: 07/19/17 02:49


Chief Complaint (Nursing): Chest Pain


Chief Complaint (Provider): Chest Pain and SOB


History Per: Patient


History/Exam Limitations: no limitations


Onset/Duration Of Symptoms: Days (x 3 days)


Current Symptoms Are (Timing): Still Present


Nitro Therapy Administered: Per EMS


Additional Complaint(s): 





John Serrano is a 65 y/o [place] male with a past medical history of 

hypertension, CAD, dyslipidemia, and right inguinal hernia repair, who was 

brought to the ED via EMS for complaints of shortness of breath with associated 

dyspnea on exertion and chest pain, onset 3 days ago. Patient describes chest 

pain as sub-sternal pressure that worsens on exertion. Denies having associated 

fever, cough, nausea, vomiting, or diarrhea. Patient brought by ALS and 

received Nitro and ASA in the field with moderate improvement in symptoms. 

Lives alone, has home health aid daily. 





PMD: Brenden Rascon MD





Past Medical History


Reviewed: Historical Data, Nursing Documentation, Vital Signs


Vital Signs: 


 Last Vital Signs











Temp  98.1 F   07/19/17 05:09


 


Pulse  71   07/19/17 05:09


 


Resp  19   07/19/17 05:09


 


BP  135/96 H  07/19/17 05:09


 


Pulse Ox  99   07/19/17 05:09














- Medical History


PMH: CAD, Diabetes, HTN, Hyperlipidemia





- Surgical History


Surgical History: Hernia Repair (right inguinal)





- Family History


Family History: States: Unknown Family Hx





- Living Arrangements


Living Arrangements: Alone (has home health aid)





- Social History


Current smoker - smoking cessation education provided: No


Alcohol: None


Drugs: Denies





- Immunization History


Hx Tetanus Toxoid Vaccination: No


Hx Influenza Vaccination: No


Hx Pneumococcal Vaccination: No





- Home Medications


Home Medications: 


 Ambulatory Orders











 Medication  Instructions  Recorded


 


Enalapril Maleate [Enalapril] 5 mg PO DAILY 08/19/16


 


Metoprolol Tartrate [Lopressor] 25 mg PO DAILY 08/19/16


 


Rosuvastatin Calcium [Crestor] 20 mg PO DAILY 08/19/16


 


Meclizine [Antivert] 25 mg PO Q8 #10 tab 09/28/16


 


Polyethylene Glycol 3350 [Miralax] 17 gm PO DAILY PRN #10 pkg 09/28/16


 


Docusate [Colace] 100 mg PO BID #14 cap 06/02/17


 


Ibuprofen [Motrin] 400 mg PO Q6 #30 tab 06/02/17


 


Aspirin [Lovell Aspirin] 81 mg PO DAILY 07/19/17














- Allergies


Allergies/Adverse Reactions: 


 Allergies











Allergy/AdvReac Type Severity Reaction Status Date / Time


 


No Known Allergies Allergy   Verified 06/14/17 14:21














Review of Systems


ROS Statement: Except As Marked, All Systems Reviewed And Found Negative


Constitutional: Negative for: Fever


Cardiovascular: Positive for: Chest Pain


Respiratory: Positive for: Shortness of Breath, SOB with Exertion.  Negative for

: Cough


Gastrointestinal: Negative for: Nausea, Vomiting, Diarrhea





Physical Exam





- Reviewed


Nursing Documentation Reviewed: Yes


Vital Signs Reviewed: Yes





- Physical Exam


Appears: Positive for: Non-toxic, No Acute Distress


Head Exam: Positive for: ATRAUMATIC, NORMAL INSPECTION, NORMOCEPHALIC


Skin: Positive for: Normal Color, Warm, Dry


Eye Exam: Positive for: EOMI, Normal appearance, PERRL


ENT: Positive for: Normal ENT Inspection


Neck: Positive for: Normal, Painless ROM, Supple


Cardiovascular/Chest: Positive for: Regular Rate, Rhythm.  Negative for: Murmur


Respiratory: Positive for: Normal Breath Sounds.  Negative for: Accessory 

Muscle Use, Respiratory Distress


Gastrointestinal/Abdominal: Positive for: Soft.  Negative for: Tenderness


Male Genital Exam: Positive for: other (Indwelling Funez catheter)


Back: Positive for: Normal Inspection.  Negative for: L CVA Tenderness, R CVA 

Tenderness, Vertebral Tenderness


Extremity: Positive for: Normal ROM, Pedal Edema (1+ edema of the lower 

extremities bilaterally).  Negative for: Deformity


Neurologic/Psych: Positive for: Alert, Oriented





- Laboratory Results


Result Diagrams: 


 07/19/17 03:55





 07/19/17 03:55





- ECG


ECG: Positive for: Interpreted By Me, Viewed By Me


Interpretation Of Abn EKG: Sinus rhythm, at 86 bpm, with 1st degree AV block, 

and nonspecific ST and T wave abnormalities


O2 Sat by Pulse Oximetry: 98 (RA)


Pulse Ox Interpretation: Normal





- Other Rad


  ** Chest X-Ray


X-Ray: Interpreted by Me, Viewed By Me


X-Ray Interpretation: shows cardiomegaly, and mild bilateral pulmonary 

congestion.





- Critical Care


Total Time (In Min): 30





Medical Decision Making


Medical Decision Making: 





Time: 03:12


Initial Impression: 65 y/o male with chest pain and SOB in setting of known CAD


Initial Plan: 


--EKG


--Labs 


--Nitroglycerin 2% oint 1 in TOP


--Pending CXR


--Admit to observation for chest pain 





Time: 04:21


--Labs reviewed, significant for elevated troponin level and proBNP. 


--IV Heparin ordered


--Patient to be admitted to ICU for further treatment, diagnosis: NSTEMI and 

CHF. Consulted with Dr. Rascon (Medicine on-call) and Dr. Oliver (hospitalist)





Time: 04:59


--Ordered Lopressor 25 mg PO





--------------------------------------------------------------------------------

-----------------


Scribe Attestation:


Documented by Jovana Wilson, acting as a scribe for Roger Sinclair MD





Provider Scribe Attestation:


All medical record entries made by the Scribe were at my direction and 

personally dictated by me. I have reviewed the chart and agree that the record 

accurately reflects my personal performance of the history, physical exam, 

medical decision making, and the department 





Disposition





- Clinical Impression


Clinical Impression: 


 NSTEMI (non-ST elevated myocardial infarction), CHF (congestive heart failure)








- Patient ED Disposition


Is Patient to be Admitted: Yes





- Disposition


Disposition Time: 04:53


Condition: GUARDED





- Pt Status Changed To:


Hospital Disposition Of: Inpatient





- Admit Certification


Admit to Inpatient:: After my assessment, the patient will require 

hospitalization for at least two midnights.  This is because of the severity of 

symptoms shown, intensity of services needed, and/or the medical risk in this 

patient being treated as an outpatient.

## 2017-07-19 NOTE — CP.PCM.CON
History of Present Illness





- History of Present Illness


History of Present Illness: 





                                               This 66-year-old diabetic man 

was hospitalized from the emergency room yesterday after he reported 

intermittent episodes of retrosternal discomfort particularly upon walking even 

a short distance of 10-15 feet. He has had similar symptoms in the posterior 

and reports having undergone an exercise test 2 years back and was told that he 

needed to take medications. He denies ever having suffered a myocardial 

infarction. Electrocardiograms as far back as 2007 showed evidence of inferior 

wall myocardial infarction. Patient was told of being a diabetic 2 years back 

and has been taking anti-diabetic medications but reports that he has not seen 

a physician for this duration. He denies any history of smoking and denies any 

alcohol intake. He denies any history of orthopnea or pedal edema. His 

grandmother was a diabetic. Otherwise, no other family member has had diabetes 

mellitus or any vascular disease.


The patient recently underwent surgery for a right inguinal hernia and 

complains of persistent discomfort in this area along with having developed 

infection for which he visited the emergency room. He has had an indwelling 

Funez catheter for approximately 3 weeks.


On physical examination this is a middle aged man free of any chest discomfort 

at this point. Alert awake and coherent. Afebrile. With a pulse rate of 64 bpm 

and regular and a blood pressure of 112/70 mmHg. His extremities were warm his 

nailbeds were pink there was no central or peripheral cyanosis or clubbing. His 

jugular venous pressure was not elevated and there was no edema over his lower 

extremities. Pedal pulses were feeble but distinctly present. There were no 

carotid bruits. The apex was not palpable. The first and second heart sounds 

were normal. There was no murmur or gallop there were no rales. His abdomen was 

soft. A scar of right inguinal hernia surgery was evident. There was an 

indwelling Funez catheter in place.


His electrocardiogram from the emergency room shows Q waves in leads 2, 3, aVF 

and tiny Q waves from V2 to V6. Review of his cardiograms of 2014 and 2007 show 

a similar pattern. An electrocardiogram from 1999 does not show Q waves in 

inferior leads.


His lab data shows an elevated troponin of 0.136 ng per DL. His proBNP was 

elevated at 3380 pg per mL. His BUN/creatinine were 26 and 1.5 mg percent 

respectively and his GFR was 45 mL per minute. His serum potassium level was 

3.3 mg/L and he has received potassium replacement.





IMPRESSION: Unstable angina and possible non-Q-wave ST elevation MI. Evidence 

of old myocardial infarct as seen on the electrocardiogram. Diabetes mellitus 

and hypertension. Status post recent right inguinal hernia surgery. Urinary 

retention with an indwelling Funez catheter.





The patient is on Lovenox and has been started on aspirin and Plavix as well. 

He is on metoprolol. I have arranged for an early coronary angiography. I have 

explained this to the patient and he agrees to proceed with it.





Past Patient History





- Past Medical History & Family History


Past Medical History?: Yes





- Past Social History


Alcohol: None


Drugs: Denies





- CARDIAC


Hx Cardiac Disorders: Yes





- PULMONARY


Hx Respiratory Disorders: No





- RENAL


Hx Chronic Kidney Disease: Yes





- ENDOCRINE/METABOLIC


Hx Diabetes Mellitus Type 2: Yes





- MUSCULOSKELETAL/RHEUMATOLOGICAL


Hx Falls: No


Hx Gout: Yes





- GASTROINTESTINAL


Hx Constipation: Yes





- GENITOURINARY/GYNECOLOGICAL


Hx Genitourinary Disorders: Yes





- PSYCHIATRIC


Hx Substance Use: No





- SURGICAL HISTORY


Hx Surgeries: Yes


Other/Comment: post inguinal  hernia





- ANESTHESIA


Hx Anesthesia: Yes


Hx Anesthesia Reactions: No


Hx Malignant Hyperthermia: No





Meds


Allergies/Adverse Reactions: 


 Allergies











Allergy/AdvReac Type Severity Reaction Status Date / Time


 


No Known Allergies Allergy   Verified 06/14/17 14:21














- Medications


Medications: 


 Current Medications





Atorvastatin Calcium (Lipitor)  40 mg PO DAILY [facility]


Enalapril Maleate (Vasotec)  5 mg PO DAILY [facility]


Famotidine (Pepcid)  20 mg PO BID [facility]


Ceftriaxone Sodium 1 gm/ (Sodium Chloride)  100 mls @ 100 mls/hr IVPB DAILY [facility]


Insulin Human Regular (Humulin R)  0 units SC ACHS [facility]


   PRN Reason: Protocol


   Last Admin: 07/19/17 07:49 Dose:  Not Given


Metoprolol Tartrate (Lopressor)  25 mg PO Q12 [facility]


Saccharomyces Boulardii (Florastor)  250 mg PO BID [facility]











Results





- Vital Signs


Recent Vital Signs: 


 Last Vital Signs











Temp  98.5 F   07/19/17 08:27


 


Pulse  55 L  07/19/17 08:27


 


Resp  20   07/19/17 08:27


 


BP  143/56 L  07/19/17 08:27


 


Pulse Ox  100   07/19/17 08:27














- Labs


Result Diagrams: 


 07/19/17 03:55





 07/19/17 03:55


Labs: 


 Laboratory Results - last 24 hr











  07/19/17 07/19/17 07/19/17





  03:55 03:55 03:55


 


WBC   6.8 


 


RBC   3.52 L 


 


Hgb   9.0 L D 


 


Hct   27.0 L 


 


MCV   76.7 L D 


 


MCH   25.7 L 


 


MCHC   33.5 


 


RDW   15.0 H 


 


Plt Count   378 


 


MPV   7.6 


 


Neut % (Auto)   68.4 


 


Lymph % (Auto)   17.7 L 


 


Mono % (Auto)   9.5 


 


Eos % (Auto)   3.5 


 


Baso % (Auto)   0.9 


 


Neut #   4.6 


 


Lymph #   1.2 


 


Mono #   0.6 


 


Eos #   0.2 


 


Baso #   0.1 


 


PT    14.3 H


 


INR    1.3 H


 


APTT    30.0


 


Sodium  142  


 


Potassium  3.3 L  


 


Chloride  111 H  


 


Carbon Dioxide  20 L  


 


Anion Gap  14  


 


BUN  26 H  


 


Creatinine  1.5  


 


Est GFR ( Amer)  56  


 


Est GFR (Non-Af Amer)  47  


 


POC Glucose (mg/dL)   


 


Random Glucose  103  


 


Calcium  7.8 L  


 


Total Bilirubin  0.3  


 


AST  21  


 


ALT  25  


 


Alkaline Phosphatase  57  


 


Troponin I  0.1360 H*  


 


NT-Pro-B Natriuret Pep  3380 H  


 


Total Protein  6.8  


 


Albumin  3.3 L  


 


Globulin  3.6  


 


Albumin/Globulin Ratio  0.9 L  


 


Urine Color   


 


Urine Clarity   


 


Urine pH   


 


Ur Specific Gravity   


 


Urine Protein   


 


Urine Glucose (UA)   


 


Urine Ketones   


 


Urine Blood   


 


Urine Nitrate   


 


Urine Bilirubin   


 


Urine Urobilinogen   


 


Ur Leukocyte Esterase   


 


Urine RBC (Auto)   


 


Urine Microscopic WBC   


 


Ur Squamous Epith Cells   


 


Urine Bacteria   














  07/19/17 07/19/17





  04:05 07:32


 


WBC  


 


RBC  


 


Hgb  


 


Hct  


 


MCV  


 


MCH  


 


MCHC  


 


RDW  


 


Plt Count  


 


MPV  


 


Neut % (Auto)  


 


Lymph % (Auto)  


 


Mono % (Auto)  


 


Eos % (Auto)  


 


Baso % (Auto)  


 


Neut #  


 


Lymph #  


 


Mono #  


 


Eos #  


 


Baso #  


 


PT  


 


INR  


 


APTT  


 


Sodium  


 


Potassium  


 


Chloride  


 


Carbon Dioxide  


 


Anion Gap  


 


BUN  


 


Creatinine  


 


Est GFR ( Amer)  


 


Est GFR (Non-Af Amer)  


 


POC Glucose (mg/dL)   117 H


 


Random Glucose  


 


Calcium  


 


Total Bilirubin  


 


AST  


 


ALT  


 


Alkaline Phosphatase  


 


Troponin I  


 


NT-Pro-B Natriuret Pep  


 


Total Protein  


 


Albumin  


 


Globulin  


 


Albumin/Globulin Ratio  


 


Urine Color  Yellow 


 


Urine Clarity  Cloudy 


 


Urine pH  6.0 


 


Ur Specific Gravity  1.016 


 


Urine Protein  >=500 


 


Urine Glucose (UA)  Neg 


 


Urine Ketones  Negative 


 


Urine Blood  Negative 


 


Urine Nitrate  Negative 


 


Urine Bilirubin  Negative 


 


Urine Urobilinogen  0.2-1.0 


 


Ur Leukocyte Esterase  Mod 


 


Urine RBC (Auto)  32 H 


 


Urine Microscopic WBC  165 H 


 


Ur Squamous Epith Cells  < 1 


 


Urine Bacteria  Occ H

## 2017-07-19 NOTE — RAD
HISTORY:

Chest pain.  Portable study 03:22.



COMPARISON:

No prior. 



FINDINGS:



LUNGS:

No active pulmonary disease.



PLEURA:

No significant pleural effusion identified, no pneumothorax apparent.



CARDIOVASCULAR:

 No radiographic findings to suggest acute or significant 

cardiovascular disease.



OSSEOUS STRUCTURES:

No significant abnormalities.



VISUALIZED UPPER ABDOMEN:

Normal.



OTHER FINDINGS:

None.



IMPRESSION:

No active disease. 



_____________________________________________



No preliminary report provided by emergency department personnel.

## 2017-07-19 NOTE — CARD
--------------- APPROVED REPORT --------------





EKG Measurement

Heart [id number]

AK [id number]

[id number]

[id number]

[id number]



<Conclusion>

Sinus rhythm with 1st degree AV block

ST & T wave abnormality, consider lateral ischemia

Prolonged QT

Abnormal ECG

## 2017-07-19 NOTE — CP.PCM.CON
History of Present Illness





- History of Present Illness


History of Present Illness: 





pt is seen and examined,full consult is dictated #[id number]





Past Patient History





- Past Medical History & Family History


Past Medical History?: Yes





- Past Social History


Smoking Status: Never Smoked


Alcohol: None


Drugs: Denies


Home Situation {Lives}: Alone


Domestic Violence: Negative





- CARDIAC


Hx Cardiac Disorders: Yes





- PULMONARY


Hx Respiratory Disorders: No





- HEENT


Hx HEENT Problems: No





- RENAL


Hx Chronic Kidney Disease: Yes





- ENDOCRINE/METABOLIC


Hx Diabetes Mellitus Type 2: Yes





- HEMATOLOGICAL/ONCOLOGICAL


Hx Blood Disorders: No


Hx AIDS: No


Hx Human Immunodeficiency Virus (HIV): No





- INTEGUMENTARY


Hx Dermatological Problems: No





- MUSCULOSKELETAL/RHEUMATOLOGICAL


Hx Falls: No


Hx Gout: Yes





- GASTROINTESTINAL


Hx Constipation: Yes





- GENITOURINARY/GYNECOLOGICAL


Hx Genitourinary Disorders: Yes





- PSYCHIATRIC


Hx Substance Use: No





- SURGICAL HISTORY


Hx Surgeries: Yes


Other/Comment: post inguinal  hernia





- ANESTHESIA


Hx Anesthesia: Yes


Hx Anesthesia Reactions: No


Hx Malignant Hyperthermia: No





Meds


Allergies/Adverse Reactions: 


 Allergies











Allergy/AdvReac Type Severity Reaction Status Date / Time


 


No Known Allergies Allergy   Verified 06/14/17 14:21














- Medications


Medications: 


 Current Medications





Acetylcysteine (Acetylcysteine 20%)  3 ml INH RBID [facility]


   Last Admin: 07/19/17 19:19 Dose:  Not Given


Atorvastatin Calcium (Lipitor)  40 mg PO HS [facility]


Clopidogrel Bisulfate (Plavix)  75 mg PO DAILY [facility]


Enalapril Maleate (Vasotec)  5 mg PO DAILY [facility]


   Last Admin: 07/19/17 08:52 Dose:  5 mg


Enoxaparin Sodium (Lovenox)  80 mg SC Q12 [facility]


   PRN Reason: Protocol


   Last Admin: 07/19/17 11:24 Dose:  80 mg


Famotidine (Pepcid)  20 mg PO BID [facility]


   Last Admin: 07/19/17 17:24 Dose:  20 mg


Ceftriaxone Sodium 1 gm/ (Sodium Chloride)  100 mls @ 100 mls/hr IVPB DAILY [facility]


   Last Admin: 07/19/17 13:28 Dose:  100 mls/hr


Sodium Chloride (Sodium Chloride 0.9%)  1,000 mls @ 50 mls/hr IV .Q20H [facility]


   Stop: 07/20/17 17:15


   Last Admin: 07/19/17 17:25 Dose:  50 mls/hr


Insulin Human Regular (Humulin R)  0 units SC ACHS [facility]


   PRN Reason: Protocol


   Last Admin: 07/19/17 17:22 Dose:  Not Given


Metoprolol Tartrate (Lopressor)  25 mg PO Q12 [facility]


   Last Admin: 07/19/17 08:51 Dose:  Not Given


Saccharomyces Boulardii (Florastor)  250 mg PO BID [facility]


   Last Admin: 07/19/17 17:20 Dose:  250 mg


Tamsulosin HCl (Flomax)  0.4 mg PO DAILY [facility]


   Last Admin: 07/19/17 11:24 Dose:  0.4 mg











Results





- Vital Signs


Recent Vital Signs: 


 Last Vital Signs











Temp  97.5 F L  07/19/17 16:00


 


Pulse  65   07/19/17 18:00


 


Resp  25 H  07/19/17 18:00


 


BP  158/90 H  07/19/17 18:00


 


Pulse Ox  100   07/19/17 18:00














- Labs


Result Diagrams: 


 07/19/17 03:55





 07/19/17 09:55


Labs: 


 Laboratory Results - last 24 hr











  07/19/17 07/19/17 07/19/17





  03:55 03:55 03:55


 


WBC   6.8 


 


RBC   3.52 L 


 


Hgb   9.0 L D 


 


Hct   27.0 L 


 


MCV   76.7 L D 


 


MCH   25.7 L 


 


MCHC   33.5 


 


RDW   15.0 H 


 


Plt Count   378 


 


MPV   7.6 


 


Neut % (Auto)   68.4 


 


Lymph % (Auto)   17.7 L 


 


Mono % (Auto)   9.5 


 


Eos % (Auto)   3.5 


 


Baso % (Auto)   0.9 


 


Neut #   4.6 


 


Lymph #   1.2 


 


Mono #   0.6 


 


Eos #   0.2 


 


Baso #   0.1 


 


PT    14.3 H


 


INR    1.3 H


 


APTT    30.0


 


Sodium  142  


 


Potassium  3.3 L  


 


Chloride  111 H  


 


Carbon Dioxide  20 L  


 


Anion Gap  14  


 


BUN  26 H  


 


Creatinine  1.5  


 


Est GFR ( Amer)  56  


 


Est GFR (Non-Af Amer)  47  


 


POC Glucose (mg/dL)   


 


Random Glucose  103  


 


Hemoglobin A1c   


 


Calcium  7.8 L  


 


Phosphorus   


 


Magnesium   


 


Iron   


 


TIBC   


 


% Saturation   


 


Ferritin   


 


Total Bilirubin  0.3  


 


AST  21  


 


ALT  25  


 


Alkaline Phosphatase  57  


 


Troponin I  0.1360 H*  


 


NT-Pro-B Natriuret Pep  3380 H  


 


Total Protein  6.8  


 


Albumin  3.3 L  


 


Globulin  3.6  


 


Albumin/Globulin Ratio  0.9 L  


 


Urine Color   


 


Urine Clarity   


 


Urine pH   


 


Ur Specific Gravity   


 


Urine Protein   


 


Urine Glucose (UA)   


 


Urine Ketones   


 


Urine Blood   


 


Urine Nitrate   


 


Urine Bilirubin   


 


Urine Urobilinogen   


 


Ur Leukocyte Esterase   


 


Urine RBC (Auto)   


 


Urine Microscopic WBC   


 


Ur Squamous Epith Cells   


 


Urine Bacteria   














  07/19/17 07/19/17 07/19/17





  04:05 07:32 09:55


 


WBC   


 


RBC   


 


Hgb   


 


Hct   


 


MCV   


 


MCH   


 


MCHC   


 


RDW   


 


Plt Count   


 


MPV   


 


Neut % (Auto)   


 


Lymph % (Auto)   


 


Mono % (Auto)   


 


Eos % (Auto)   


 


Baso % (Auto)   


 


Neut #   


 


Lymph #   


 


Mono #   


 


Eos #   


 


Baso #   


 


PT   


 


INR   


 


APTT   


 


Sodium    142


 


Potassium    3.7


 


Chloride    109 H


 


Carbon Dioxide    22


 


Anion Gap    15


 


BUN    23 H


 


Creatinine    1.4


 


Est GFR ( Amer)    > 60


 


Est GFR (Non-Af Amer)    50


 


POC Glucose (mg/dL)   117 H 


 


Random Glucose    143 H


 


Hemoglobin A1c   


 


Calcium    7.9 L


 


Phosphorus    4.1


 


Magnesium    1.3 L


 


Iron   


 


TIBC   


 


% Saturation   


 


Ferritin    97.6


 


Total Bilirubin   


 


AST   


 


ALT   


 


Alkaline Phosphatase   


 


Troponin I    1.2300 H*


 


NT-Pro-B Natriuret Pep   


 


Total Protein   


 


Albumin   


 


Globulin   


 


Albumin/Globulin Ratio   


 


Urine Color  Yellow  


 


Urine Clarity  Cloudy  


 


Urine pH  6.0  


 


Ur Specific Gravity  1.016  


 


Urine Protein  >=500  


 


Urine Glucose (UA)  Neg  


 


Urine Ketones  Negative  


 


Urine Blood  Negative  


 


Urine Nitrate  Negative  


 


Urine Bilirubin  Negative  


 


Urine Urobilinogen  0.2-1.0  


 


Ur Leukocyte Esterase  Mod  


 


Urine RBC (Auto)  32 H  


 


Urine Microscopic WBC  165 H  


 


Ur Squamous Epith Cells  < 1  


 


Urine Bacteria  Occ H  














  07/19/17 07/19/17 07/19/17





  09:55 09:55 12:47


 


WBC   


 


RBC   


 


Hgb   


 


Hct   


 


MCV   


 


MCH   


 


MCHC   


 


RDW   


 


Plt Count   


 


MPV   


 


Neut % (Auto)   


 


Lymph % (Auto)   


 


Mono % (Auto)   


 


Eos % (Auto)   


 


Baso % (Auto)   


 


Neut #   


 


Lymph #   


 


Mono #   


 


Eos #   


 


Baso #   


 


PT   


 


INR   


 


APTT   


 


Sodium   


 


Potassium   


 


Chloride   


 


Carbon Dioxide   


 


Anion Gap   


 


BUN   


 


Creatinine   


 


Est GFR ( Amer)   


 


Est GFR (Non-Af Amer)   


 


POC Glucose (mg/dL)    139 H


 


Random Glucose   


 


Hemoglobin A1c  6.5  


 


Calcium   


 


Phosphorus   


 


Magnesium   


 


Iron   36 L 


 


TIBC   200 L 


 


% Saturation   18 L 


 


Ferritin   


 


Total Bilirubin   


 


AST   


 


ALT   


 


Alkaline Phosphatase   


 


Troponin I   


 


NT-Pro-B Natriuret Pep   


 


Total Protein   


 


Albumin   


 


Globulin   


 


Albumin/Globulin Ratio   


 


Urine Color   


 


Urine Clarity   


 


Urine pH   


 


Ur Specific Gravity   


 


Urine Protein   


 


Urine Glucose (UA)   


 


Urine Ketones   


 


Urine Blood   


 


Urine Nitrate   


 


Urine Bilirubin   


 


Urine Urobilinogen   


 


Ur Leukocyte Esterase   


 


Urine RBC (Auto)   


 


Urine Microscopic WBC   


 


Ur Squamous Epith Cells   


 


Urine Bacteria   














  07/19/17 07/19/17





  16:48 17:31


 


WBC  


 


RBC  


 


Hgb  


 


Hct  


 


MCV  


 


MCH  


 


MCHC  


 


RDW  


 


Plt Count  


 


MPV  


 


Neut % (Auto)  


 


Lymph % (Auto)  


 


Mono % (Auto)  


 


Eos % (Auto)  


 


Baso % (Auto)  


 


Neut #  


 


Lymph #  


 


Mono #  


 


Eos #  


 


Baso #  


 


PT  


 


INR  


 


APTT  


 


Sodium  


 


Potassium  


 


Chloride  


 


Carbon Dioxide  


 


Anion Gap  


 


BUN  


 


Creatinine  


 


Est GFR ( Amer)  


 


Est GFR (Non-Af Amer)  


 


POC Glucose (mg/dL)  111 H 


 


Random Glucose  


 


Hemoglobin A1c  


 


Calcium  


 


Phosphorus  


 


Magnesium  


 


Iron  


 


TIBC  


 


% Saturation  


 


Ferritin  


 


Total Bilirubin  


 


AST  


 


ALT  


 


Alkaline Phosphatase  


 


Troponin I   1.3900 H*


 


NT-Pro-B Natriuret Pep  


 


Total Protein  


 


Albumin  


 


Globulin  


 


Albumin/Globulin Ratio  


 


Urine Color  


 


Urine Clarity  


 


Urine pH  


 


Ur Specific Gravity  


 


Urine Protein  


 


Urine Glucose (UA)  


 


Urine Ketones  


 


Urine Blood  


 


Urine Nitrate  


 


Urine Bilirubin  


 


Urine Urobilinogen  


 


Ur Leukocyte Esterase  


 


Urine RBC (Auto)  


 


Urine Microscopic WBC  


 


Ur Squamous Epith Cells  


 


Urine Bacteria

## 2017-07-20 VITALS
TEMPERATURE: 97.4 F | DIASTOLIC BLOOD PRESSURE: 84 MMHG | SYSTOLIC BLOOD PRESSURE: 162 MMHG | HEART RATE: 74 BPM | RESPIRATION RATE: 21 BRPM

## 2017-07-20 VITALS — OXYGEN SATURATION: 98 %

## 2017-07-20 LAB
ALBUMIN/GLOB SERPL: 0.9 {RATIO} (ref 1–2.1)
ALP SERPL-CCNC: 63 U/L (ref 38–126)
ALT SERPL-CCNC: 27 U/L (ref 21–72)
AST SERPL-CCNC: 25 U/L (ref 17–59)
BILIRUB SERPL-MCNC: 0.2 MG/DL (ref 0.2–1.3)
BUN SERPL-MCNC: 17 MG/DL (ref 9–20)
CALCIUM SERPL-MCNC: 8 MG/DL (ref 8.4–10.2)
CHLORIDE SERPL-SCNC: 111 MMOL/L (ref 98–107)
CO2 SERPL-SCNC: 22 MMOL/L (ref 22–30)
ERYTHROCYTE [DISTWIDTH] IN BLOOD BY AUTOMATED COUNT: 14.9 % (ref 11.5–14.5)
GLOBULIN SER-MCNC: 3.5 GM/DL (ref 2.2–3.9)
GLUCOSE SERPL-MCNC: 106 MG/DL (ref 75–110)
HCT VFR BLD CALC: 29.9 % (ref 35–51)
MCH RBC QN AUTO: 24.5 PG (ref 27–31)
MCHC RBC AUTO-ENTMCNC: 31.7 G/DL (ref 33–37)
MCV RBC AUTO: 77.4 FL (ref 80–94)
PLATELET # BLD: 366 K/UL (ref 130–400)
POTASSIUM SERPL-SCNC: 3.3 MMOL/L (ref 3.6–5)
PROT SERPL-MCNC: 6.7 G/DL (ref 6.3–8.2)
SODIUM SERPL-SCNC: 143 MMOL/L (ref 132–148)
WBC # BLD AUTO: 7.6 K/UL (ref 4.8–10.8)

## 2017-07-20 RX ADMIN — ACETYLCYSTEINE SCH: 200 INHALANT RESPIRATORY (INHALATION) at 08:14

## 2017-07-20 NOTE — PQF GENQUE
Dr. Rascon,



Please specify the type  of heart failure in your progress notes: if known 

TYPE:

 Combined systolic and diastolic

 Diastolic

 Systolic

 Other (please specify)______________

 Clinically unable to determine

 Unknown



H and P: 2) Acute heart failure  Assessment and Plan:   positive Pro-Bnp  
Unknown etiology of HF, ordered echo for further evaluation  Status: Acute 
Priority: High  

Pro-BNP:3380



7/19 :CXR report: Impression: No active disease. 



7/19: Echo report: Conclusion; left ventricle normal size:normal lt. 
ventricular wall thickness: lt. ventricle mildly impaired: E.F.:45-50%; see the 
full report in the EMR 













***** This form is a permanent part of the medical record*****





          

Clarification of your documentation is requested to better reflect the severity 
of illness and intensity of treatment of your patient.  



Indicators present      



[]  Specify: []

         



[]  Specify: []         

 



[]  Specify: []         





[]  Specify: []         





Location in the medical record that reflects the above clinical findings:  []

 



Treatment Provided:  []

  

PHYSICIAN'S RESPONSE





Based on your medical judgment of the clinical indicators outlined above please 
clarify the following:



[]  Practitioner response 



[]  If unable to determine, please check the box, sign and date.  





Present On Admission (POA) Indicator:





[] Present at the time of admission 



[] Not present at the time of admission



[] Clinically Undetermined









In responding to this query, please exercise your independent professional 
judgment.  The fact that a question is asked does not imply that any particular 
answer is desired or expected.  Thank you for your clarification on this 
documentation.



If you have any questions please call.

* Thank you,

   Yary Loja RN BSN

   ext. #[phone]
MTDD

## 2017-07-20 NOTE — CON
COMPREHENSIVE UROLOGIC CONSULTATION



DATE:  07/19/2017



TIME:  Approximately 10:35 a.m.



REASON FOR CONSULTATION:  Urinary retention and Funez catheter.



BRIEF HISTORY:  The patient is a 66-year-old male from [place], status post

right inguinal hernia surgery about 6 weeks ago and required a Funez

catheter postop.  The patient currently is still with his Funez catheter at

this time and was admitted to the ICU at [facility]

for complaint of chest pain.  The patient was never started on Flomax

postop for treatment of BPH and possible urinary retention.  Patient,

however, states that he voided with his usual normal strain preop without

any complaints.



PAST SURGICAL HISTORY:  His only past surgical history is the current

postop right inguinal hernia surgery.  He has no other surgical history.



PHYSICAL EXAMINATION:

GENERAL:  Patient is a well-developed and well-nourished male.  He is

alert.  He is oriented.

HEENT:  Grossly within normal limits.

NECK:  Supple.  Thyroid not palpable.

ABDOMEN:  Soft, not distended or tender.  He does have a positive right

inguinal scar.

GENITALIA:  He is noncircumcised with normal glans meatus without any

rashes or lesions visualized.  Testes are down bilaterally, nontender

without any masses.

RECTAL:  Shows normal rectal tone without fluctuance or masses.  Prostate

is slightly enlarged, smooth, symmetrical, nontender without nodules or

indurations with palpable median sulcus.  He currently has a Funez catheter

draining concepción urine well.

EXTREMITIES:  He has full range of motion of both upper and lower

extremities.



LABORATORY DATA:  Today, 07/19/2017, shows a CBC with a WBC count of 6.8,

hemoglobin of 9.0, hematocrit 27.0, and a platelet count of 378,000.  His

chem profile shows a glucose of 111, BUN and creatinine of 26 and 1.5

respectively with a CO2 of 20.  His sodium is 142, potassium 3.3, chloride

was 111, and glucose was 103.



The patient also has a medical history of hypertension, hyperlipidemia, and

also bowel pathology and currently on famotidine (Pepcid).  His *------* is

14.3, INR is 1.3, and PTT is 30.0, on Lovenox.  His calcium is 7.8 and his

GFR is 47 consistent with chronic kidney disease stage III.  His troponin 1

level is high at 0.1360 and his previous troponin value was 1.2300.  His

urinalysis, the color was yellow, appearance was cloudy, pH is 6.0,

specific gravity 1.016, and protein was greater than 500.  Glucose

negative.  Ketones, blood, nitrite, and bilirubin were all negative. 

Leukocyte esterase was moderate.  There were 32 RBCs and 155 WBCs with

occasional bacteria per high-power field indicating a possible urinary

tract infection.  Currently, there is no urine C and S available.



DIAGNOSTIC IMPRESSION FOR THIS PATIENT:

1.  Urinary retention.

2.  BPH.

3.  Possible urinary tract infection.

4.  Chronic kidney disease, stage III.

5.  Proteinuria.



PLAN FOR THIS PATIENT:  We will start the patient on Flomax.  Continue his

IV Rocephin, pending the urine culture and sensitivity and the Funez

catheter can be discontinued in about 2-3 days after the urine starts to

clear on IV Rocephin.





__________________________________________

Sunny Hernandez MD



DD:  07/19/2017 10:42:41

DT:  07/19/2017 23:30:27

Job # [id number]

## 2017-07-20 NOTE — CON
DATE OF CONSULTATION:  07/19/2017



The patient is located in the ICU room 425, bed 1.



REQUESTING PHYSICIAN:  Brenden Rascon MD



REASON FOR RENAL CONSULTATION:  CKD 3 for further evaluation, chest pain,

and shortness of breath.



HISTORY OF PRESENT ILLNESS:  Mr. Serrano is a 66-year-old elderly [place]

male with the past medical history significant for longstanding

hypertension, diabetes, coronary artery disease, dyslipidemia, status post

right inguinal hernia repair, and questionable urinary retention with

bedside Funez catheter for the last 40 days as per the patient which was

placed in [facility] when he went for the hernia repair and

followed by complications.  The patient was admitted to ICU with chief

complaints of chest discomfort since Monday.  The patient felt that pain is

mostly on the anterior chest wall severe in nature associated with dyspnea

on exertion and shortness of breath.  As per the patient, he was feeling

short of breath even after taking about 6-7 steps and denies any radiation

of the pain.  Denies any nausea, vomiting, diarrhea.  Denies any fever. 

Denies any cough.  Denies any abdominal pain.  Denies any dysuria or

frequency.  Denies any swelling of the legs.  His pain is aggravated by

walking.



PAST MEDICAL HISTORY:  Significant for;

1.  Hypertension.

2.  Diabetes.

3.  Coronary artery disease.

4.  Dyslipidemia.



PAST SURGICAL HISTORY:  Status post hernia repair.



SOCIAL HISTORY:  Denies any smoking, alcohol, or drugs.



PERSONAL HISTORY:  He is single.



CURRENT MEDICATIONS:  Include as follows; Rocephin 1 g daily, Flomax 0.4 mg

p.o. daily, Florastor 250 mg p.o. b.i.d., Humulin R for sliding scale,

Lipitor 40 mg p.o. at bedtime, metoprolol 25 mg p.o. q. 12 hours, Lovenox

80 mg subcu q. 12 hours, Pepcid 20 mg p.o. b.i.d., Plavix 75 mg p.o. daily,

IV fluids 50 mL per hour, and enalapril 5 mg p.o. daily.



REVIEW OF SYSTEMS:  Significant for chest pain and shortness of breath. 

All other review of system and reviewed and are negative.



PHYSICAL EXAMINATION:  As follows;

VITAL SIGNS:  Blood pressure 158/90, pulse 65, and respirations 21 to 25,

temperature 97.5, saturation 100%.  Height 5 feet 5 inches and weight is

170 pounds.

GENERAL:  Mr. Serrano is a 66-year-old elderly [place] male, Middle

Eastern, moderately built, moderately nourished, not in acute distress.  On

nasal cannula.

HEENT:  Pupils are normal and reactive to light and accommodation. 

Conjunctivae pink.  Sclerae anicteric.  Tongue is moist.  Trachea is

midline.

LUNGS:  Symmetry on both sides.  Bilateral breath sounds present.  Clear on

auscultation.

HEART:  [place] of the fifth intercostal space midclavicular line.  S1 and S2

audible.  No murmur.  No gallop.

ABDOMEN:  Normal in appearance.  Soft.  No rebounding.  No guarding.  No

rigidity.  No hepatosplenomegaly.  The patient has scar in the right groin

region.  No hepatosplenomegaly.  No abdominal bruits.  The patient also has

Funez catheter with thigh bag.

CENTRAL NERVOUS SYSTEM:  The patient is alert, awake, and oriented x3. 

Nonfocal neurologic examination.  Cranial nerves II through XII are grossly

intact.  Sensory motor system is within normal limits.  Cranial nerves II

through XII are grossly intact.

EXTREMITIES:  No cyanosis.  No clubbing.  No edema.



LABORATORY DATA:  Include as follows, as of 07/19/2017; sodium 142,

potassium 3.7, chloride 109, CO2 of 22, BUN 23, creatinine 1.4, GFR is

about 50, glucose 143.  Hemoglobin A1c 6.5.  Calcium 7.9, phosphorus 4.1,

magnesium 1.3.  Iron is 36, TIBC 200, saturation 18, and ferritin level is

97.6.  Troponin levels 0.136 and second one 1.23 and the third one 1.39. 

Urinalysis as of 07/19/2017; yellow, cloudy, pH 6, specific gravity 1.016,

protein more then 500, glucose negative, ketones negative, blood negative,

nitrate negative, bilirubin is negative, urobilinogen *------*, leukocyte

esterase moderate, RBC 32, and bacteria is occasional.  Chest x-ray

as of 07/19/2017, no active disease.  Echocardiogram as of 07/19/2017, left

ventricular normal size.  There is normal left ventricular wall thickness

and left ventricular mildly impaired and ejection fraction is about 45 to

50%.  Mid apical hypokinesis.  Transmitral doppler flow pattern is grade 1,

abnormal relaxation pattern.  The aortic valve is moderately sclerotic. 

There is mild valvular aortic stenosis, mild mitral regurgitation,

pericardium appear normal.



In summary, Mr. Serrano is a 66-year-old elderly [place] male with

hypertension, diabetes, dyslipidemia, coronary artery disease, status post

right inguinal hernia repair, questionable urinary retention with a Funez

catheter to the thigh bag changed about to got 2 to 3 weeks ago, was

admitted with chest pain, severe in nature and associated on exertion,

cannot walk more than 6 to 7 steps with elevated troponin levels and with

creatinine 1.4.



ASSESSMENT:

1.  Chronic kidney disease stage III, rule out diabetic nephropathy versus

hypertensive nephrosclerosis.

2.  Rule out urinary tract infection.

3.  Non-ST elevation myocardial infarction, acute.



PLAN:  We will start Mucomyst 20% 3 mL q 12 hours and also start him on

normal saline at 50 mL per hour and for possible cardiac cath in a.m.  I

will continue to monitor his BMP and the patient will have a mild risk for

contrast-induced nephropathy and also continue IV antibiotics and follow up

urine culture and continue Flomax and we will follow.



Thank you for allowing me to participate in your patient's care and repeat

BMP in a.m. and also ultrasound of the kidneys for size and also

hypermagnesemia supplement magnesium 2 g IV piggyback x1.





__________________________________________

Jac Carson MD





DD:  07/19/2017 22:57:15

DT:  07/20/2017 4:59:42

Job # [id number]

## 2017-07-21 LAB
BETA1 GLOB FLD ELPH-MCNC: 0.3 G/DL (ref 0.4–0.6)
BETA2 GLOB FLD ELPH-MCNC: 0.4 G/DL (ref 0.2–0.5)
GAMMA GLOB SERPL ELPH-MCNC: 1.4 G/DL (ref 0.8–1.7)
PROT SERPL-MCNC: 6.2 G/DL (ref 6.1–8.1)

## 2017-08-05 ENCOUNTER — HOSPITAL ENCOUNTER (INPATIENT)
Dept: HOSPITAL 14 - H.ER | Age: 67
LOS: 4 days | Discharge: LEFT BEFORE DISCHARGE | DRG: 280 | End: 2017-08-09
Attending: INTERNAL MEDICINE | Admitting: INTERNAL MEDICINE
Payer: MEDICARE

## 2017-08-05 VITALS — BODY MASS INDEX: 25.8 KG/M2

## 2017-08-05 DIAGNOSIS — B96.1: ICD-10-CM

## 2017-08-05 DIAGNOSIS — E11.9: ICD-10-CM

## 2017-08-05 DIAGNOSIS — R33.9: ICD-10-CM

## 2017-08-05 DIAGNOSIS — I21.4: ICD-10-CM

## 2017-08-05 DIAGNOSIS — I50.23: ICD-10-CM

## 2017-08-05 DIAGNOSIS — E83.51: ICD-10-CM

## 2017-08-05 DIAGNOSIS — I25.110: Primary | ICD-10-CM

## 2017-08-05 DIAGNOSIS — I25.5: ICD-10-CM

## 2017-08-05 DIAGNOSIS — E78.00: ICD-10-CM

## 2017-08-05 DIAGNOSIS — E78.5: ICD-10-CM

## 2017-08-05 DIAGNOSIS — T83.511A: ICD-10-CM
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
## 2017-08-05 DIAGNOSIS — I11.0: ICD-10-CM

## 2017-08-05 LAB
APTT BLD: 28.9 SECONDS (ref 25.6–37.1)
BASE EXCESS BLDV CALC-SCNC: 1.1 MMOL/L (ref 0–2)
BASOPHILS # BLD AUTO: 0.2 K/UL (ref 0–0.2)
BASOPHILS NFR BLD: 1.9 % (ref 0–2)
BNP SERPL-MCNC: 2330 PG/ML (ref 0–900)
BUN SERPL-MCNC: 18 MG/DL (ref 9–20)
CALCIUM SERPL-MCNC: 5.9 MG/DL (ref 8.4–10.2)
EOSINOPHIL # BLD AUTO: 0.1 K/UL (ref 0–0.7)
EOSINOPHIL NFR BLD: 1.4 % (ref 0–4)
ERYTHROCYTE [DISTWIDTH] IN BLOOD BY AUTOMATED COUNT: 15.5 % (ref 11.5–14.5)
GFR NON-AFRICAN AMERICAN: > 60
HGB BLD-MCNC: 9.5 G/DL (ref 12–18)
INR PPP: 1.4 (ref 0.9–1.2)
LYMPHOCYTES # BLD AUTO: 1.4 K/UL (ref 1–4.3)
LYMPHOCYTES NFR BLD AUTO: 17.4 % (ref 20–40)
MCH RBC QN AUTO: 24.8 PG (ref 27–31)
MCHC RBC AUTO-ENTMCNC: 32.2 G/DL (ref 33–37)
MCV RBC AUTO: 77.1 FL (ref 80–94)
MONOCYTES # BLD: 0.7 K/UL (ref 0–0.8)
MONOCYTES NFR BLD: 9 % (ref 0–10)
NEUTROPHILS # BLD: 5.8 K/UL (ref 1.8–7)
NEUTROPHILS NFR BLD AUTO: 70.3 % (ref 50–75)
NRBC BLD AUTO-RTO: 0 % (ref 0–0)
PCO2 BLDV: 30 MMHG (ref 40–60)
PH BLDV: 7.5 [PH] (ref 7.32–7.43)
PLATELET # BLD: 348 K/UL (ref 130–400)
PMV BLD AUTO: 8.1 FL (ref 7.2–11.7)
PROTHROMBIN TIME: 14 SECONDS (ref 9.8–13.1)
RBC # BLD AUTO: 3.82 MIL/UL (ref 4.4–5.9)
VENOUS BLOOD FIO2: 21 %
VENOUS BLOOD GAS PO2: 70 MM/HG (ref 30–55)
WBC # BLD AUTO: 8.2 K/UL (ref 4.8–10.8)

## 2017-08-05 NOTE — ED PDOC
HPI: Chest Pain


Time Seen by Provider: 08/05/17 21:30


Chief Complaint (Nursing): Chest Pain


Chief Complaint (Provider): Chest pain


History Per: Patient


History/Exam Limitations: no limitations


Onset/Duration Of Symptoms: Days


Current Symptoms Are (Timing): Still Present


Additional Complaint(s): 


The patient is a 67yo male, past medical history of CAD, hypertension, DM, 

hypercholesterolemia, presents to the ED for evaluation of anterior chest pain, 

with associated difficulty breathing, present for the entire day. Patient 

reports the pain is constant and he feels short of breath at time. He denies 

any fever, cough, leg swelling. Of note, patient has a history of CAD and was 

admitted on 07/19 for NSTEMI and signed out AMA before getting his cath angio. 

Patient has been seen by Dr. Rascon his pcp as well as cardiologist Dr. Connors; 

patient states he has not followed up with his cardiologist. patient currently 

denies any other medical complaints.





Past Medical History


Reviewed: Historical Data, Nursing Documentation, Vital Signs


Vital Signs: 


 Last Vital Signs











Temp  102.2 F H  08/05/17 21:12


 


Pulse  103 H  08/05/17 23:48


 


Resp  20   08/05/17 21:12


 


BP  129/70   08/05/17 21:12


 


Pulse Ox  95   08/05/17 23:48














- Medical History


PMH: CAD, Diabetes, HTN, Hypercholesterolemia, Hyperlipidemia, Chronic Kidney 

Disease


   Denies: HIV





- Surgical History


Surgical History: Hernia Repair (right inguinal)





- Family History


Family History: States: Unknown Family Hx





- Immunization History


Hx Tetanus Toxoid Vaccination: No


Hx Influenza Vaccination: No


Hx Pneumococcal Vaccination: No





- Home Medications


Home Medications: 


 Ambulatory Orders











 Medication  Instructions  Recorded


 


Enalapril Maleate [Enalapril] 5 mg PO DAILY 08/19/16


 


Metoprolol Tartrate [Lopressor] 25 mg PO DAILY 08/19/16


 


Rosuvastatin Calcium [Crestor] 20 mg PO DAILY 08/19/16


 


Meclizine [Antivert] 25 mg PO Q8 #10 tab 09/28/16


 


Polyethylene Glycol 3350 [Miralax] 17 gm PO DAILY PRN #10 pkg 09/28/16


 


Docusate [Colace] 100 mg PO BID #14 cap 06/02/17


 


Ibuprofen [Motrin] 400 mg PO Q6 #30 tab 06/02/17


 


Aspirin [Arenac Aspirin] 81 mg PO DAILY 07/19/17














- Allergies


Allergies/Adverse Reactions: 


 Allergies











Allergy/AdvReac Type Severity Reaction Status Date / Time


 


No Known Allergies Allergy   Verified 06/14/17 14:21














KELLIE Risk Score for UA/NSTEMI





- KELLIE Risk Score


Age > 64: YES


3 or more CAD Risk Factors: YES


Known CAD (Stenosis greater than 50%): NO


Aspirin use in past 7 days: YES


Severe Angina: YES


EKG ST changes greater than 0.5mm: NO


Positive Cardiac Marker: NO


KELLIE Score: 4


Risk %: 20%





Curb-65 Severity Score





- CURB-65 Severity Score


Confusion: No


Bun >19mg/dl (>7mmol/L): No


Respiratory Rate greater than/equal to 30: No


Systolic BP <90 or Diastolic BP less than/equal 60mmHg: No


Age >64: No


Curb-65 Score: 0


Percentage 30-day mortality: 0.6%





Wells Criteria for PE





- Wells Criteria for Pulmonary Embolism


Clinical Signs and Symptoms of DVT: No


P.E is #1 Diagnosis, or Equally Likely: No


Heart Rate >100: No


Immobilization at least 3 days;Surgery previous 4 weeks: No


Previous, objectively diagnosed PE or DVT: No


Hemoptysis: No


Malignancy w/treatment within 6 months, or palliative: No


Total Score: 0





Review of Systems


ROS Statement: Except As Marked, All Systems Reviewed And Found Negative


Constitutional: Negative for: Fever, Chills


Cardiovascular: Positive for: Chest Pain


Respiratory: Positive for: Shortness of Breath.  Negative for: Cough


Musculoskeletal: Negative for: Other (leg swelling)





Physical Exam





- Reviewed


Nursing Documentation Reviewed: Yes


Vital Signs Reviewed: Yes





- Physical Exam


Appears: Positive for: Well, Non-toxic, No Acute Distress


Head Exam: Positive for: ATRAUMATIC, NORMAL INSPECTION, NORMOCEPHALIC


Skin: Positive for: Normal Color, Warm, DRY


Eye Exam: Positive for: EOMI, Normal appearance, PERRL


Neck: Positive for: Normal, Painless ROM


Cardiovascular/Chest: Positive for: Regular Rate, Rhythm.  Negative for: Murmur


Respiratory: Positive for: Normal Breath Sounds.  Negative for: Respiratory 

Distress


Gastrointestinal/Abdominal: Positive for: Normal Exam, Soft.  Negative for: 

Tenderness


Back: Positive for: Normal Inspection


Extremity: Positive for: Normal ROM.  Negative for: Pedal Edema, Deformity, 

Swelling


Neurologic/Psych: Positive for: Alert, Oriented.  Negative for: Motor/Sensory 

Deficits





- Laboratory Results


Result Diagrams: 


 08/05/17 22:08





 08/05/17 22:08





- ECG


ECG: Positive for: Interpreted By Me, Viewed By Me


ECG Rhythm: Positive for: Normal QRS, Normal ST Segment, Sinus Rhythm, Sinus 

Tachycardia.  Negative for: ST/T Changes


Rate: 103


O2 Sat by Pulse Oximetry: 95 (RA)


Pulse Ox Interpretation: Normal





- Radiology


X-Ray: Interpreted by Me, Viewed By Me


X-Ray Interpretation: No Acute Disease





Medical Decision Making


Medical Decision Making: 


Time: 2135


Impression: Chest pain most likely ACS. Also consider pneumonia, UTI (patient 

has indwelling trujillo from prior visit), rule out sepsis, rule out DVT and PE.


Plan:


-- Labs


-- EKG


-- Zosyn


-- Aspirin


-- Nitro SL 


Reassess





Duplex lower legs: no acute findings





Time: 2300


case discussed with Dr. Rascon, patient to be admitted to telemetry under 

hospitalist.


Scribe Attestation:


Documented by Miryam De Anda acting as a scribe for Vikas Bynum MD. 





Provider Attestation:


All medical record entries made by the Scribe were at my direction and 

personally dictated by me. I have reviewed the chart and agree that the record 

accurately reflects my personal performance of the history, physical exam, 

medical decision making, and the department course for this patient. I have 

also personally directed, reviewed, and agree with the discharge instructions 

and disposition.








Disposition





- Clinical Impression


Clinical Impression: 


 Chest pain, Fever, CHF (congestive heart failure)








- Patient ED Disposition


Is Patient to be Admitted: Yes


Discussed With : Brenden Rascon


Doctor Will See Patient In The: Hospital


Counseled Patient/Family Regarding: Studies Performed, Diagnosis





- Disposition


Disposition Time: 23:00


Condition: FAIR





- Pt Status Changed To:


Hospital Disposition Of: Inpatient





- Admit Certification


Admit to Inpatient:: After my assessment, the patient will require 

hospitalization for at least two midnights.  This is because of the severity of 

symptoms shown, intensity of services needed, and/or the medical risk in this 

patient being treated as an outpatient.





- POA


Present On Arrival: None


Core Measure Indicators: Chest Pain

## 2017-08-06 LAB
ERYTHROCYTE [DISTWIDTH] IN BLOOD BY AUTOMATED COUNT: 15.2 % (ref 11.5–14.5)
HGB BLD-MCNC: 9.3 G/DL (ref 12–18)
MCH RBC QN AUTO: 25.2 PG (ref 27–31)
MCHC RBC AUTO-ENTMCNC: 32.8 G/DL (ref 33–37)
MCV RBC AUTO: 76.8 FL (ref 80–94)
PLATELET # BLD: 319 K/UL (ref 130–400)
RBC # BLD AUTO: 3.69 MIL/UL (ref 4.4–5.9)
WBC # BLD AUTO: 8.6 K/UL (ref 4.8–10.8)

## 2017-08-06 RX ADMIN — WATER SCH MLS/HR: 1 INJECTION INTRAMUSCULAR; INTRAVENOUS; SUBCUTANEOUS at 09:30

## 2017-08-06 RX ADMIN — WATER SCH MLS/HR: 1 INJECTION INTRAMUSCULAR; INTRAVENOUS; SUBCUTANEOUS at 21:54

## 2017-08-06 RX ADMIN — WATER SCH MLS/HR: 1 INJECTION INTRAMUSCULAR; INTRAVENOUS; SUBCUTANEOUS at 16:03

## 2017-08-06 RX ADMIN — INSULIN LISPRO SCH: 100 INJECTION, SOLUTION INTRAVENOUS; SUBCUTANEOUS at 21:56

## 2017-08-06 RX ADMIN — ENOXAPARIN SODIUM SCH MG: 40 INJECTION SUBCUTANEOUS at 08:30

## 2017-08-06 RX ADMIN — INSULIN LISPRO SCH: 100 INJECTION, SOLUTION INTRAVENOUS; SUBCUTANEOUS at 08:03

## 2017-08-06 RX ADMIN — INSULIN LISPRO SCH: 100 INJECTION, SOLUTION INTRAVENOUS; SUBCUTANEOUS at 17:30

## 2017-08-06 RX ADMIN — WATER SCH: 1 INJECTION INTRAMUSCULAR; INTRAVENOUS; SUBCUTANEOUS at 21:55

## 2017-08-06 RX ADMIN — INSULIN LISPRO SCH: 100 INJECTION, SOLUTION INTRAVENOUS; SUBCUTANEOUS at 11:27

## 2017-08-06 NOTE — US
EXAM:

  US Duplex Bilateral Lower Extremity Veins



CLINICAL HISTORY:

  66 years old, male; Pain; Leg, lower; Bilateral; Additional info: R/O dvt/pe



TECHNIQUE:

  Real-time ultrasound scan of the veins of the bilateral lower extremities 

with color Doppler flow, spectral waveform analysis and compression.



COMPARISON:

  No relevant prior studies available.



FINDINGS:

  Right deep veins:  Normal color and spectral Doppler flow.  Normal 

compressibility.  No deep vein thrombosis from common femoral to popliteal vein.

  Right superficial veins:  Unremarkable.



  Left deep veins:  Normal color and spectral Doppler flow.  Normal 

compressibility.  No deep vein thrombosis from common femoral to popliteal vein.

  Left superficial veins:  Unremarkable.



  Soft tissues:  No popliteal cyst.



IMPRESSION:     

1.  No evidence of DVT within the lower extremities.

2.  Incidental/non-acute findings are described above.

## 2017-08-06 NOTE — HP
DATE:  08/06/2017



CHIEF COMPLAINT:  Chest pain.



HISTORY OF PRESENT ILLNESS:  This is a 66-year-old male known case of

coronary artery disease, hypertension, diabetes, elevated cholesterol, and

CKD who was recently admitted to this hospital and then signed out before

workup can be completed.  He was doing okay, did not have any good follow

up and then the patient was brought to emergency room and was admitted for

further management.



REVIEW OF SYSTEMS:  Positive for chest pain and urinary symptoms.  Review

of systems otherwise is negative for headache, dizziness, loss of

consciousness, shortness of breath, nausea, vomiting, diarrhea,

constipation, any new joint or extremity pain.  Review of systems of all

other organ system is unremarkable.



PAST MEDICAL HISTORY:  Significant for hypertension, diabetes, coronary

artery disease, elevated cholesterol, congestive heart failure, with

ejection fraction very low, and chronic kidney disease.



PAST SURGICAL HISTORY:  Remarkable for right inguinal hernia with some

postoperative complication.



PERSONAL HISTORY:  The patient is currently nonsmoker and nondrinker.  No

substance abuse.



MEDICATIONS:  The patient is on multiple medications which are including

enalapril, Lopressor, Crestor, _____, Miralax, Colace, Motrin, and aspirin.



ALLERGIES:  THE PATIENT IS NOT ALLERGIC TO ANY MEDICATIONS.



FAMILY HISTORY:  Noncontributory.



PHYSICAL EXAMINATION

GENERAL:  A well-developed and well-nourished overweight 66-year-old male

in no acute distress.

VITAL SIGNS:  Temperature of 100.6, pulse of 79, respirations of 18, and

blood pressure of 131/69.

HEENT:  Pupils are reactive to light.  No JVD.  No thyromegaly.  No

lymphadenopathy.  No nystagmus.  Normocephalic and atraumatic skull.

HEART:  S1 and S2 normal and regular.  No significant murmur, gallop, or

rub is heard.

LUNGS:  Exam shows good bilateral air exchange.  No rales or rhonchi.

ABDOMEN:  Soft and nontender.  No organomegaly and no bruits.  Bowel sounds

are plus.

EXTREMITIES:  No edema.  No calf swelling.  No tenderness.  No acute

ischemia.

CENTRAL NERVOUS SYSTEM:  Essentially unchanged.  There is no sign of any

acute gross focal, motor or sensory neurological deficits.  _____

RECTAL:  The patient has refused.



DIAGNOSTIC DATA:  Available diagnostic data reviewed.  Telemetry monitoring

does not show any significant arrhythmias.  WBC of 8.2, hemoglobin of 9.5,

hematocrit of 39.5, and platelets of 348.  PT is 14 and PTT is 28.9. 

Sodium 140, potassium of 3.0, chloride of 117, bicarb of 17, BUN of 18, and

creatinine of 1.0.  Troponin level is 0.0120.  BNP level is 2330.  EKG does

not reveal any acute ST-T changes.  Chest x-ray; it is poor film, but no

acute infiltrate noted.  Doppler studies does not reveal any DVT.



ADMITTING IMPRESSION:  Chest pain, rule out acute coronary syndrome,

congestive heart failure, coronary artery disease, urinary tract infection,

anemia, and congestive heart failure is systolic, acute on chronic.



PLAN:  As ordered.  Case and plan discussed with patient.







__________________________________________

Brenden Rascon MD







DD:  08/06/2017 7:10:10

DT:  08/06/2017 9:35:35

Job # [id number]

## 2017-08-06 NOTE — CP.PCM.CON
History of Present Illness





- History of Present Illness


History of Present Illness: 





I was asked to evaluate patient by Dr. Rascon.





Patient is a 66 year old male with PMH HTN, CAD, sichemic cardiomyopathy who 

presents with angina.  The patient had a recent admission to [facility] for NSTEMI.  

The patient was scheduled for cardiac cath, but signed out.  He returns with 

intermittent substernal chest pressure and dyspnea.  ProBNP was elevated.  

Patient was also found to be hypocalcemic.  





Review of Systems





- Constitutional


Constitutional: absent: As Per HPI, Anorexia, Chills, Daytime Sleepiness, 

Excessive Sweating, Fatigue, Fever, Frequent Falls, Headache, Increased Appetite

, Lethargy, Malaise, Night Sweats, Snoring, Sleep Apnea, Weight Gain, Weight 

Loss, Weakness, Other





- EENT


Eyes: absent: As Per HPI, Blind Spots, Blurred Vision, Change in Vision, 

Decreased Night Vision, Diplopia, Discharge, Dry Eye, Exophthalmos, Floaters, 

Irritation, Itchy Eyes, Loss of Peripheral Vision, Pain, Photophobia, Requires 

Corrective Lenses, Sees Flashes, Spots in Vision, Tunnel Vision, Other Visual 

Disturbances, Loss of Vision, Other


Nose/Mouth/Throat: absent: As Per HPI, Epistaxis, Nasal Congestion, Nasal 

Discharge, Nasal Obstruction, Nasal Trauma, Nose Pain, Post Nasal Drip, Sinus 

Pain, Sinus Pressure, Bleeding Gums, Change in Voice, Dental Pain, Dry Mouth, 

Dysphagia, Halitosis, Hoarsness, Lip Swelling, Mouth Lesions, Mouth Pain, 

Odynophagia, Sore Throat, Throat Swelling, Tongue Swelling, Facial Pain, Neck 

Pain, Neck Mass, Other





- Cardiovascular


Cardiovascular: Chest Pain, Dyspnea





- Respiratory


Respiratory: Dyspnea





- Gastrointestinal


Gastrointestinal: absent: As Per HPI, Abdominal Pain, Belching, Bloating, 

Change in Bowel Habits, Change in Stool Character, Coffee Ground Emesis, 

Constipation, Cramping, Diarrhea, Dyspepsia, Dysphagia, Early Satiety, 

Excessive Flatus, Fecal Incontinence, Heartburn, Hematemesis, Hematochezia, 

Loose Stools, Melena, Nausea, Odynophagia, Temesmus, Vomiting, Other





- Genitourinary


Genitourinary: absent: As Per HPI, Change in Urinary Stream, Difficulty 

Urinating, Dysuria, Flank Pain, Hematuria, Pyuria, Nocturia, Urinary 

Incontinence, Urinary Frequency, Urinary Hesitance, Urinary Urgency, Voiding 

Freq/Small Amts, Freq UTI, Hx Renal/Bladder Calculi, Hx /Renal Surgery, 

Bladder Distension, Other





- Musculoskeletal


Musculoskeletal: absent: As Per HPI, Abnormal Gait, Arthralgias, Atrophy, Back 

Pain, Deformity, Joint Swelling, Limited Range of Motion, Loss of Height, 

Muscle Cramps, Muscle Weakness, Myalgias, Neck Pain, Numbness, Radiating Pain 

into Limb, Stiffness, Tingling, Other





- Integumentary


Integumentary: absent: As Per HPI, Acne, Alopecia, Bleeding Lesions, Change in 

Hair, Change in Nails, Change in Pigmentation, Changing Lesions, Dry Skin, 

Erythema, Furuncle, Hirsutism, Lesions, New Lesions, Non-Healing Lesions, 

Photosensitivity, Pruritus, Rash, Skin Pain, Skin Ulcer, Sores, Striae, Swelling

, Unusual Bruising, Wounds, Jaundice, Other





- Neurological


Neurological: absent: As Per HPI, Abnormal Gait, Abnormal Hearing, Abnormal 

Movements, Abnormal Speech, Behavioral Changes, Burning Sensations, Confusion, 

Convulsions, Disequilibrium, Dizziness, Numbness, Focal Weakness, Frequent Falls

, Headaches, Lack of Coordination, Loss of Vision, Memory Loss, Paresthesias, 

Radicular Pain, Restless Legs, Sensory Deficit, Syncope, Tingling, Tremor, 

Vertigo, Weakness, Other Visual Disturbances, Other





- Psychiatric


Psychiatric: absent: As Per HPI, Abnormal Sleep Pattern, Anhedonia, Anxiety, 

Auditory Hallucinations, Behavioral Changes, Change in Appetite, Change in 

Libido, Confusion, Depression, Difficulty Concentrating, Hallucinations, 

Homicidal Ideation, Hopelessness, Irritability, Memory Loss, Mood Swings, Panic 

Attacks, Paranoia, Suicidal Ideation, Visual Hallucinations, Tactile 

Hallucinations, Other





- Endocrine


Endocrine: absent: As Per HPI, Change in Body Appearance, Change in Libido, 

Cold Intolorance, Deepening of Voice, Excessive Sweating, Fatigue, Flushing, 

Heat Intolorance, Increase in Ring/Shoe/Hat Size, Palpitations, Polydipsia, 

Polyphagia, Polyuria, Other





- Hematologic/Lymphatic


Hematologic: absent: As Per HPI, Easy Bleeding, Easy Bruising, Lymphadenopathy, 

Other





Past Patient History





- Past Medical History & Family History


Past Medical History?: Yes





- Past Social History


Smoking Status: Never Smoked





- CARDIAC


Hx Cardiac Disorders: Yes


Hx Heart Attack: Yes (As per patient)


Hx Hypertension: Yes





- PULMONARY


Hx Respiratory Disorders: No





- NEUROLOGICAL


Hx Neurological Disorder: No





- HEENT


Hx HEENT Problems: No





- RENAL


Hx Chronic Kidney Disease: Yes





- ENDOCRINE/METABOLIC


Hx Endocrine Disorders: Yes


Hx Diabetes Mellitus Type 2: Yes





- HEMATOLOGICAL/ONCOLOGICAL


Hx Human Immunodeficiency Virus (HIV): No





- INTEGUMENTARY


Hx Dermatological Problems: No





- MUSCULOSKELETAL/RHEUMATOLOGICAL


Hx Musculoskeletal Disorders: No


Hx Falls: No





- GASTROINTESTINAL


Hx Constipation: Yes





- GENITOURINARY/GYNECOLOGICAL


Hx Genitourinary Disorders: Yes





- PSYCHIATRIC


Hx Psychophysiologic Disorder: No


Hx Substance Use: No





- SURGICAL HISTORY


Hx Surgeries: Yes


Hx Herniorrhaphy: Yes (inguinal)


Other/Comment: post inguinal  hernia





- ANESTHESIA


Hx Anesthesia: Yes


Hx Anesthesia Reactions: No


Hx Malignant Hyperthermia: No





Meds


Allergies/Adverse Reactions: 


 Allergies











Allergy/AdvReac Type Severity Reaction Status Date / Time


 


No Known Allergies Allergy   Verified 06/14/17 14:21














- Medications


Medications: 


 Current Medications





Acetaminophen (Tylenol 325mg Tab)  650 mg PO Q6 PRN


   PRN Reason: Temperature (101.0)


Aspirin (Aspirin Chewable)  81 mg PO DAILY [facility]


   Last Admin: 08/06/17 08:28 Dose:  81 mg


Atorvastatin Calcium (Lipitor)  40 mg PO DAILY [facility]


   Last Admin: 08/06/17 08:29 Dose:  40 mg


Docusate Sodium (Colace)  100 mg PO BID [facility]


   Last Admin: 08/06/17 08:29 Dose:  100 mg


Enalapril Maleate (Vasotec)  5 mg PO DAILY [facility]


   Last Admin: 08/06/17 08:30 Dose:  5 mg


Enoxaparin Sodium (Lovenox)  40 mg SC DAILY [facility]


   PRN Reason: Protocol


   Last Admin: 08/06/17 08:30 Dose:  40 mg


Piperacillin Sod/Tazobactam (Sod 3.375 gm/ Sodium Chloride)  100 mls @ 100 mls/

hr IVPB Q6 [facility]


   Last Admin: 08/06/17 09:30 Dose:  100 mls/hr


Ibuprofen (Motrin Tab)  400 mg PO Q6 PRN


   PRN Reason: Pain, Mild (1-3)


Insulin Human Lispro (Humalog)  0 units SC ACHS [facility]


   PRN Reason: Protocol


   Last Admin: 08/06/17 11:27 Dose:  Not Given


Meclizine HCl (Antivert)  25 mg PO Q8 PRN


   PRN Reason: Nausea/Vomiting


Metoprolol Tartrate (Lopressor)  25 mg PO DAILY [facility]


   Last Admin: 08/06/17 08:29 Dose:  25 mg


Polyethylene Glycol (Miralax)  17 gm PO DAILY [facility]


   PRN Reason: Constipation











Physical Exam





- Constitutional


Appears: Non-toxic





- Head Exam


Head Exam: NORMAL INSPECTION





- Eye Exam


Eye Exam: Normal appearance





- ENT Exam


ENT Exam: Mucous Membranes Moist





- Neck Exam


Neck exam: Positive for: Full Rom





- Respiratory Exam


Respiratory Exam: NORMAL BREATHING PATTERN





- Cardiovascular Exam


Cardiovascular Exam: REGULAR RHYTHM





- GI/Abdominal Exam


GI & Abdominal Exam: Normal Bowel Sounds





- Rectal Exam


Rectal Exam: Deferred





- Extremities Exam


Extremities exam: Negative for: pedal edema





- Back Exam


Back exam: NORMAL INSPECTION





- Neurological Exam


Neurological exam: Alert, Oriented x3





- Psychiatric Exam


Psychiatric exam: Normal Affect





- Skin


Skin Exam: Normal Color





Results





- Vital Signs


Recent Vital Signs: 


 Last Vital Signs











Temp  100.5 F H  08/06/17 08:25


 


Pulse  69   08/06/17 09:00


 


Resp  20   08/06/17 08:25


 


BP  138/77   08/06/17 08:29


 


Pulse Ox  97   08/06/17 08:25














- Labs


Result Diagrams: 


 08/06/17 05:30





 08/05/17 22:08


Labs: 


 Laboratory Results - last 24 hr











  08/06/17 08/06/17 08/06/17





  05:22 05:30 05:30


 


WBC    8.6


 


RBC    3.69 L


 


Hgb    9.3 L


 


Hct    28.3 L


 


MCV    76.8 L


 


MCH    25.2 L


 


MCHC    32.8 L


 


RDW    15.2 H


 


Plt Count    319


 


POC Glucose (mg/dL)  105  


 


Troponin I   0.0410 














  08/06/17





  11:17


 


WBC 


 


RBC 


 


Hgb 


 


Hct 


 


MCV 


 


MCH 


 


MCHC 


 


RDW 


 


Plt Count 


 


POC Glucose (mg/dL)  116 H


 


Troponin I 














- EKG Data


EKG Interpreted by: Myself





Assessment & Plan


(1) Unstable angina


Assessment and Plan: 


patient had a previous NSTEMI, but did not have evaluation.  givne unstable 

nature of his current symptoms he will need cardiac cath.  He needs evaluation 

of LV function and correction of hypokalemia.  


Status: Acute   





(2) CHF (congestive heart failure)


Assessment and Plan: 


will evaluate LV function with echocardiogram


Status: Acute   





(3) Hypertension


Assessment and Plan: 


blood pressure control


Status: Acute   





(4) Type 2 diabetes mellitus


Assessment and Plan: 


follow blood sugar


Status: Acute

## 2017-08-06 NOTE — RAD
HISTORY:

 chest pain 



COMPARISON:

No prior. 



FINDINGS:



LUNGS:

No active pulmonary disease. Linear fibrosis at the right base. 



PLEURA:

No significant pleural effusion identified, no pneumothorax apparent.



CARDIOVASCULAR:

Normal. Atherosclerotic aorta. 



OSSEOUS STRUCTURES:

No significant abnormalities.



VISUALIZED UPPER ABDOMEN:

Normal.



OTHER FINDINGS:

None.



IMPRESSION:

No active disease.

## 2017-08-06 NOTE — CARD
--------------- APPROVED REPORT --------------





EKG Measurement

Heart [id number]

TN [id number]

[id number]

[id number]

[id number]



<Conclusion>

Sinus tachycardia

Left anterior fascicular block

Inferior infarct, age undetermined

Cannot rule out Anterior infarct, age undetermined

Abnormal ECG

## 2017-08-07 LAB
% IRON SATURATION: 11 % (ref 20–55)
ALBUMIN SERPL-MCNC: 3.3 G/DL (ref 3.5–5)
ALBUMIN SERPL-MCNC: 3.4 G/DL (ref 3.5–5)
ALBUMIN/GLOB SERPL: 0.9 {RATIO} (ref 1–2.1)
ALBUMIN/GLOB SERPL: 0.9 {RATIO} (ref 1–2.1)
ALT SERPL-CCNC: 26 U/L (ref 21–72)
ALT SERPL-CCNC: 27 U/L (ref 21–72)
AST SERPL-CCNC: 18 U/L (ref 17–59)
AST SERPL-CCNC: 19 U/L (ref 17–59)
BUN SERPL-MCNC: 23 MG/DL (ref 9–20)
BUN SERPL-MCNC: 23 MG/DL (ref 9–20)
CALCIUM SERPL-MCNC: 8.3 MG/DL (ref 8.4–10.2)
CALCIUM SERPL-MCNC: 8.5 MG/DL (ref 8.4–10.2)
ERYTHROCYTE [DISTWIDTH] IN BLOOD BY AUTOMATED COUNT: 15.6 % (ref 11.5–14.5)
FERRITIN SERPL-MCNC: 146 NG/ML
FOLATE SERPL-MCNC: 10.2 NG/ML
GFR NON-AFRICAN AMERICAN: 47
GFR NON-AFRICAN AMERICAN: 47
HGB BLD-MCNC: 8.8 G/DL (ref 12–18)
IRON SERPL-MCNC: 24 UG/DL (ref 49–181)
MCH RBC QN AUTO: 25.2 PG (ref 27–31)
MCHC RBC AUTO-ENTMCNC: 32.2 G/DL (ref 33–37)
MCV RBC AUTO: 78.4 FL (ref 80–94)
PLATELET # BLD: 287 K/UL (ref 130–400)
RBC # BLD AUTO: 3.5 MIL/UL (ref 4.4–5.9)
TIBC SERPL-MCNC: 211 UG/DL (ref 250–450)
TROPONIN I SERPL-MCNC: 0.04 NG/ML (ref 0–0.12)
VIT B12 SERPL-MCNC: 523 PG/ML (ref 239–931)
WBC # BLD AUTO: 7.5 K/UL (ref 4.8–10.8)

## 2017-08-07 RX ADMIN — ENOXAPARIN SODIUM SCH MG: 40 INJECTION SUBCUTANEOUS at 09:21

## 2017-08-07 RX ADMIN — WATER SCH MLS/HR: 1 INJECTION INTRAMUSCULAR; INTRAVENOUS; SUBCUTANEOUS at 15:48

## 2017-08-07 RX ADMIN — INSULIN LISPRO SCH: 100 INJECTION, SOLUTION INTRAVENOUS; SUBCUTANEOUS at 21:46

## 2017-08-07 RX ADMIN — INSULIN LISPRO SCH U: 100 INJECTION, SOLUTION INTRAVENOUS; SUBCUTANEOUS at 11:30

## 2017-08-07 RX ADMIN — WATER SCH MLS/HR: 1 INJECTION INTRAMUSCULAR; INTRAVENOUS; SUBCUTANEOUS at 09:19

## 2017-08-07 RX ADMIN — INSULIN LISPRO SCH: 100 INJECTION, SOLUTION INTRAVENOUS; SUBCUTANEOUS at 16:55

## 2017-08-07 RX ADMIN — INSULIN LISPRO SCH: 100 INJECTION, SOLUTION INTRAVENOUS; SUBCUTANEOUS at 07:40

## 2017-08-07 RX ADMIN — WATER SCH MLS/HR: 1 INJECTION INTRAMUSCULAR; INTRAVENOUS; SUBCUTANEOUS at 05:16

## 2017-08-07 NOTE — PQF RENAL
***** This form is a permanent part of the medical record*****



8/7/17  Dr. Rascon,



Please clarify the stage of CKD if known.



Documentation of a history of CKD. BUN 18->23, Creatinine 1.0->1.5, GFR >60->
47. 

   

Clarification of your documentation is requested to better reflect the severity 
of illness and intensity of treatment of your patient.  



Indicators present   

 

[] Oliguria/anuria               



[] Edema/weight gain               



[] Hyponatremia               

 

[] Confusion/mental status changes         



[] Increased Blood Urea Nitrogen/Creatinine      

 

[] Increased Potassium/Decreased potassium         

 

[] Anemia (male <13.5, female <12.0)      

 

[] Proteinuria                  



[] Metabolic Acidosis OR Alkalosis          



[] Hypotension/shock               

 

[] Decreased GFR               



[] Other: []



Location in the medical record that reflects the above clinical findings:



PHYSICIAN'S RESPONSE





Based on your medical judgment of the clinical indicators outlined above, are 
you treating this patient for a known or suspected:



[] Acute Renal Failure



[] Acute Kidney Injury



[] Azotemia/prerenal azotemia



[] Chronic kidney disease      Stage I []      Stage II []     Stage III []    
  Stage IV []



[] Other condition/diagnosis:[]

 

[] If Unable to Determine, please check the box, sign and date.   



Present On Admission (POA) Indicator:



[] Present at the time of admission 



[] Not present at the time of admission 



[] Clinically Undetermined  

                                    



In responding to this query, please exercise your independent professional 
judgment.  The fact that a question is asked does not imply that any particular 
answer is desired or expected.  Thank you for your clarification on this 
documentation.





If you have any questions please call:ext [phone] Medical Records Dept

* Thank you,

   Jigna Robertson RN

   CD

 

 Chronic Kidney Disease Stages 

 *[employer]*







Stage I GFR >90

 

Stage II GFR 60-89

 

Stage III GFR 30-59

 

Stage IV GFR 15-29

 

Stage V~~~~~~~~~~  GFR <15~~~~~~~~~~~~~ 





 



  

MTDD

## 2017-08-07 NOTE — CP.PCM.PN
Subjective





- Date & Time of Evaluation


Date of Evaluation: 08/07/17


Time of Evaluation: 16:35





- Subjective


Subjective: 





patient had an episode of chest pain this morning.  Patient responded to 

nitrates.





Objective





- Vital Signs/Intake and Output


Vital Signs (last 24 hours): 


 











Temp Pulse Resp BP Pulse Ox


 


 97.9 F   84   20   155/96 H  97 


 


 08/07/17 16:24  08/07/17 16:24  08/07/17 16:24  08/07/17 16:24  08/07/17 16:24








Intake and Output: 


 











 08/07/17 08/07/17





 06:59 18:59


 


Output Total 975 


 


Balance -975 














- Medications


Medications: 


 Current Medications





Acetaminophen (Tylenol 325mg Tab)  650 mg PO Q6 [facility]


   PRN Reason: Temperature (101.0)


Aspirin (Aspirin Chewable)  81 mg PO DAILY [facility]


   Last Admin: 08/07/17 09:20 Dose:  81 mg


Atorvastatin Calcium (Lipitor)  40 mg PO DAILY [facility]


   Last Admin: 08/07/17 09:22 Dose:  40 mg


Docusate Sodium (Colace)  100 mg PO BID [facility]


   Last Admin: 08/07/17 16:55 Dose:  100 mg


Enalapril Maleate (Vasotec)  5 mg PO DAILY [facility]


   Last Admin: 08/07/17 09:20 Dose:  5 mg


Enoxaparin Sodium (Lovenox)  40 mg SC DAILY [facility]


   PRN Reason: Protocol


   Last Admin: 08/07/17 09:21 Dose:  40 mg


Ferrous Sulfate (Feosol)  325 mg PO BID [facility]


   Last Admin: 08/07/17 16:55 Dose:  325 mg


Piperacillin Sod/Tazobactam (Sod 3.375 gm/ Sodium Chloride)  100 mls @ 100 mls/

hr IVPB Q6 [facility]


   Last Admin: 08/07/17 15:48 Dose:  100 mls/hr


Ibuprofen (Motrin Tab)  400 mg PO Q6 [facility]


   PRN Reason: Pain, Mild (1-3)


Insulin Human Lispro (Humalog)  0 units SC ACHS [facility]


   PRN Reason: Protocol


   Last Admin: 08/07/17 16:55 Dose:  Not Given


Meclizine HCl (Antivert)  25 mg PO Q8 PRN


   PRN Reason: Nausea/Vomiting


Metoprolol Tartrate (Lopressor)  25 mg PO DAILY [facility]


   Last Admin: 08/07/17 09:22 Dose:  25 mg


Polyethylene Glycol (Miralax)  17 gm PO DAILY [facility]


   PRN Reason: Constipation


Propranolol HCl (Inderal)  30 mg PO DAILY [facility]


   Last Admin: 08/07/17 09:21 Dose:  30 mg











- Labs


Labs: 


 





 08/07/17 06:40 





 08/07/17 06:40 





 











PT  14.0 Seconds (9.8-13.1)  H  08/05/17  22:08    


 


INR  1.4  (0.9-1.2)  H  08/05/17  22:08    


 


APTT  28.9 Seconds (25.6-37.1)   08/05/17  22:08    














- Constitutional


Appears: Non-toxic





- Head Exam


Head Exam: NORMAL INSPECTION





- Eye Exam


Eye Exam: Normal appearance





- ENT Exam


ENT Exam: Mucous Membranes Moist





- Neck Exam


Neck Exam: Full ROM





- Respiratory Exam


Respiratory Exam: NORMAL BREATHING PATTERN





- Cardiovascular Exam


Cardiovascular Exam: REGULAR RHYTHM





- GI/Abdominal Exam


GI & Abdominal Exam: Normal Bowel Sounds





- Rectal Exam


Rectal Exam: Deferred





- Extremities Exam


Extremities Exam: absent: Pedal Edema





- Back Exam


Back Exam: NORMAL INSPECTION





- Neurological Exam


Neurological Exam: Alert





- Psychiatric Exam


Psychiatric exam: Normal Affect





- Skin


Skin Exam: Normal Color





Assessment and Plan


(1) Unstable angina


Assessment & Plan: 


will plan for cardiac cath tomorrow.  NPO after midnight.


Status: Acute   





(2) CHF (congestive heart failure)


Assessment & Plan: 


improved fluid status


Status: Acute   





(3) Hypertension


Assessment & Plan: 


blood pressure control


Status: Acute   





(4) Type 2 diabetes mellitus


Assessment & Plan: 


risk factor for diabetes


Status: Acute

## 2017-08-07 NOTE — PQF GENQUE
***** This form is a permanent part of the medical record*****



8/7/17  Dr. Rascon



Please clarify the relationship, if any, between the urinary catheter and the 
UTI. 



Admitted with chest pain and difficulty breathing. Noted to have urinary 
catheter present on admission. Temp 102.2, , WBC 8.2. Urine cs growing >
100,00 gram negative rods and treated with Zosyn.



          

Clarification of your documentation is requested to better reflect the severity 
of illness and intensity of treatment of your patient.  





  

PHYSICIAN'S RESPONSE



Please clarify the relationship, if any, between the urinary catheter and the 
UTI.



Are the conditions:

 []  Due to or associated with each other UTI DUE TO URINARY CATHETER 

 [] Unrelated to each other

 [] Clinically unable to determine[]

 [] Unknown





Based on your medical judgment of the clinical indicators outlined above please 
clarify the following:



[]  Practitioner response 



[]  If unable to determine, please check the box, sign and date.  





Present On Admission (POA) Indicator:





[] Present at the time of admission 



[] Not present at the time of admission



[] Clinically Undetermined









In responding to this query, please exercise your independent professional 
judgment.  The fact that a question is asked does not imply that any particular 
answer is desired or expected.  Thank you for your clarification on this 
documentation.



If you have any questions please call:ext [phone] Medical Records Dept

* Thank you,

   Jigna Robertson RN

   [facility]

## 2017-08-08 PROCEDURE — 4A023N7 MEASUREMENT OF CARDIAC SAMPLING AND PRESSURE, LEFT HEART, PERCUTANEOUS APPROACH: ICD-10-PCS

## 2017-08-08 PROCEDURE — B206YZZ PLAIN RADIOGRAPHY OF RIGHT AND LEFT HEART USING OTHER CONTRAST: ICD-10-PCS

## 2017-08-08 RX ADMIN — INSULIN LISPRO SCH: 100 INJECTION, SOLUTION INTRAVENOUS; SUBCUTANEOUS at 06:51

## 2017-08-08 RX ADMIN — WATER SCH MLS/HR: 1 INJECTION INTRAMUSCULAR; INTRAVENOUS; SUBCUTANEOUS at 05:28

## 2017-08-08 RX ADMIN — WATER SCH MLS/HR: 1 INJECTION INTRAMUSCULAR; INTRAVENOUS; SUBCUTANEOUS at 17:17

## 2017-08-08 RX ADMIN — WATER SCH MLS/HR: 1 INJECTION INTRAMUSCULAR; INTRAVENOUS; SUBCUTANEOUS at 05:27

## 2017-08-08 RX ADMIN — WATER SCH: 1 INJECTION INTRAMUSCULAR; INTRAVENOUS; SUBCUTANEOUS at 10:57

## 2017-08-08 RX ADMIN — INSULIN LISPRO SCH: 100 INJECTION, SOLUTION INTRAVENOUS; SUBCUTANEOUS at 11:30

## 2017-08-08 RX ADMIN — ENOXAPARIN SODIUM SCH: 40 INJECTION SUBCUTANEOUS at 08:17

## 2017-08-08 RX ADMIN — INSULIN LISPRO SCH: 100 INJECTION, SOLUTION INTRAVENOUS; SUBCUTANEOUS at 21:08

## 2017-08-08 RX ADMIN — INSULIN LISPRO SCH: 100 INJECTION, SOLUTION INTRAVENOUS; SUBCUTANEOUS at 17:19

## 2017-08-08 NOTE — CP.PCM.CON
History of Present Illness





- History of Present Illness


History of Present Illness: 





Infectious Disease Consultation Note-





Asked to see this patient at the request of  for ESBL UTI





HPI-


patient is a 66 year old male with h/o CAD, DM II, HTN and hyperlipidemia who 

was admitted 3 days ago with c/o dyspnea and chest pain.


pt. explains that 3 weeks ago he was here for chest pain and was diagnosed with 

heart attack ( NSTEMI)  but signed out AMA.


pt. is s/p cardiac cath today by cardiologist  and as per him pt. 

has multi vessel heart disease and would need bypass surgery.


Pt. also goes on to say that about 3 [place] [facility] [place] 

and apparently after the surgery he had developed pain and swelling at the site 

of the surgery and he went back to the hospital and he was told he may have 

fluid collection/abscess at the inguinal surgical site and would need to have 

the mesh removed and apparently pt. refused to have this done and signed out 

AMA.


also ever since the hernia operation he states he has had urinary retention and 

is with indwelling trujillo cath with leg bag.


Pt. states he has experienced some lower abd pain since then .


he denies any nausea or vomiting.


Pt. also mentions he has lost 40 pounds in the past 2 months, he states his 

appetite is low.


He states his catheter gets changed once a month.


he denies any fever or chills.


I'm asked to evaluate the patient bc of esbl in urine cx.

















Review of Systems





- Review of Systems


Review of Systems: 





ROS- 


denies any fever or chills, denies any cough , + dyspnea and sob progressive 

for past few months, denies any chest pain now but had it on admission, denies 

any n/v, states had lower abd /pelvic pain after hernia operation but not now, 

denies any diarrhea


states has lost 40 pounds in past couple months, has decreased appetite








Past Patient History





- Past Medical History & Family History


Past Medical History?: Yes





- Past Social History


Smoking Status: Never Smoked


Drugs: Denies


Home Situation {Lives}: Alone





- CARDIAC


Hx Cardiac Disorders: Yes


Hx Heart Attack: Yes (As per patient)


Hx Hypertension: Yes





- PULMONARY


Hx Respiratory Disorders: No





- NEUROLOGICAL


Hx Neurological Disorder: No





- HEENT


Hx HEENT Problems: No





- ENDOCRINE/METABOLIC


Hx Endocrine Disorders: Yes


Hx Diabetes Mellitus Type 2: Yes





- INTEGUMENTARY


Hx Dermatological Problems: No





- MUSCULOSKELETAL/RHEUMATOLOGICAL


Hx Musculoskeletal Disorders: No


Hx Falls: No





- GASTROINTESTINAL


Hx Constipation: Yes





- GENITOURINARY/GYNECOLOGICAL


Hx Genitourinary Disorders: Yes





- PSYCHIATRIC


Hx Psychophysiologic Disorder: No


Hx Substance Use: No





- SURGICAL HISTORY


Hx Surgeries: Yes


Hx Herniorrhaphy: Yes (inguinal)


Other/Comment: post inguinal  hernia





- ANESTHESIA


Hx Anesthesia: Yes


Hx Anesthesia Reactions: No


Hx Malignant Hyperthermia: No





Meds


Allergies/Adverse Reactions: 


 Allergies











Allergy/AdvReac Type Severity Reaction Status Date / Time


 


No Known Allergies Allergy   Verified 06/14/17 14:21














- Medications


Medications: 


 Current Medications





Acetaminophen (Tylenol 325mg Tab)  650 mg PO Q6 PRN


   PRN Reason: Temperature (101.0)


Aspirin (Aspirin Chewable)  81 mg PO DAILY [facility]


   Last Admin: 08/08/17 08:16 Dose:  Not Given


Atorvastatin Calcium (Lipitor)  40 mg PO DAILY [facility]


   Last Admin: 08/08/17 08:17 Dose:  Not Given


Docusate Sodium (Colace)  100 mg PO BID [facility]


   Last Admin: 08/08/17 08:17 Dose:  Not Given


Enalapril Maleate (Vasotec)  5 mg PO DAILY [facility]


   Last Admin: 08/08/17 08:18 Dose:  Not Given


Enoxaparin Sodium (Lovenox)  40 mg SC DAILY [facility]


   PRN Reason: Protocol


   Last Admin: 08/08/17 08:17 Dose:  Not Given


Ferrous Sulfate (Feosol)  325 mg PO BID [facility]


   Last Admin: 08/08/17 08:17 Dose:  Not Given


Piperacillin Sod/Tazobactam (Sod 3.375 gm/ Sodium Chloride)  100 mls @ 100 mls/

hr IVPB Q6 [facility]


   Last Admin: 08/08/17 10:57 Dose:  Not Given


Ibuprofen (Motrin Tab)  400 mg PO Q6 PRN


   PRN Reason: Pain, Mild (1-3)


Insulin Human Lispro (Humalog)  0 units SC ACHS [facility]


   PRN Reason: Protocol


   Last Admin: 08/08/17 06:51 Dose:  Not Given


Meclizine HCl (Antivert)  25 mg PO Q8 PRN


   PRN Reason: Nausea/Vomiting


Metoprolol Tartrate (Lopressor)  25 mg PO DAILY [facility]


   Last Admin: 08/08/17 08:17 Dose:  Not Given


Polyethylene Glycol (Miralax)  17 gm PO DAILY [facility]


   PRN Reason: Constipation


Propranolol HCl (Inderal)  30 mg PO DAILY [facility]


   Last Admin: 08/08/17 08:17 Dose:  Not Given











Physical Exam





- Constitutional


Appears: No Acute Distress





- Head Exam


Head Exam: ATRAUMATIC





- Eye Exam


Eye Exam: EOMI, PERRL





- ENT Exam


ENT Exam: Normal Oropharynx





- Neck Exam


Neck exam: Positive for: Full Rom





- Respiratory Exam


Respiratory Exam: Clear to Auscultation Bilateral, NORMAL BREATHING PATTERN





- Cardiovascular Exam


Cardiovascular Exam: RRR, +S1, +S2





- GI/Abdominal Exam


GI & Abdominal Exam: Normal Bowel Sounds, Soft


Additional comments: 





ND, NT


right inguinal hernia surgical site clean/dry/intact





NO CVA tenderness B/L





- Extremities Exam


Extremities exam: Positive for: normal inspection





- Neurological Exam


Neurological exam: Alert, Oriented x3





Results





- Vital Signs


Recent Vital Signs: 


 Last Vital Signs











Temp  98.7 F   08/08/17 08:00


 


Pulse  66   08/08/17 08:15


 


Resp  18   08/08/17 08:00


 


BP  186/96 H  08/08/17 08:15


 


Pulse Ox  99   08/08/17 08:00














- Labs


Result Diagrams: 


 08/09/17 05:00





 08/09/17 05:00


Labs: 


 Laboratory Results - last 24 hr











  08/07/17 08/07/17 08/07/17





  10:30 10:30 11:44


 


POC Glucose (mg/dL)    152 H


 


Iron  24 L  


 


TIBC  211 L  


 


% Saturation  11 L  


 


Troponin I   


 


Folate   10.2 














  08/07/17 08/07/17 08/07/17





  12:45 16:15 21:04


 


POC Glucose (mg/dL)   104  145 H


 


Iron   


 


TIBC   


 


% Saturation   


 


Troponin I  0.0160  


 


Folate   














  08/08/17





  05:28


 


POC Glucose (mg/dL)  94


 


Iron 


 


TIBC 


 


% Saturation 


 


Troponin I 


 


Folate 








 Laboratory Results - last 72 hr











  08/05/17 08/05/17 08/05/17





  06:40 22:08 22:08


 


WBC    8.2


 


RBC    3.82 L


 


Hgb    9.5 L


 


Hct    29.5 L


 


MCV    77.1 L


 


MCH    24.8 L


 


MCHC    32.2 L


 


RDW    15.5 H


 


Plt Count    348


 


MPV    8.1


 


Neut % (Auto)    70.3


 


Lymph % (Auto)    17.4 L


 


Mono % (Auto)    9.0


 


Eos % (Auto)    1.4


 


Baso % (Auto)    1.9


 


Neut #    5.8


 


Lymph #    1.4


 


Mono #    0.7


 


Eos #    0.1


 


Baso #    0.2


 


PT   


 


INR   


 


APTT   


 


pO2   


 


VBG pH   


 


VBG pCO2   


 


VBG HCO3   


 


VBG Total CO2   


 


VBG O2 Sat (Calc)   


 


VBG Base Excess   


 


Glucose   


 


Lactate   


 


FiO2   


 


Crit Value Called To   


 


Crit Value Called By   


 


Crit Value Read Back   


 


Blood Gas Notified Time   


 


Sodium   140 


 


Potassium   3.0 L 


 


Chloride   117 H 


 


Carbon Dioxide   17 L 


 


Anion Gap   9 L 


 


BUN   18 


 


Creatinine   1.0 


 


Est GFR ( Amer)   > 60 


 


Est GFR (Non-Af Amer)   > 60 


 


POC Glucose (mg/dL)   


 


Random Glucose   87 


 


Calcium   5.9 L* D 


 


Iron   


 


TIBC   


 


% Saturation   


 


Ferritin   


 


Total Bilirubin   


 


AST   


 


ALT   


 


Alkaline Phosphatase   


 


Troponin I  0.0170  < 0.0120 


 


NT-Pro-B Natriuret Pep   2330 H 


 


Total Protein   


 


Albumin   


 


Globulin   


 


Albumin/Globulin Ratio   


 


Vitamin B12   


 


Folate   


 


Stool Occult Blood   


 


Influenza Typ A,B (EIA)   














  08/05/17 08/05/17 08/05/17





  22:08 22:15 22:30


 


WBC   


 


RBC   


 


Hgb   


 


Hct   


 


MCV   


 


MCH   


 


MCHC   


 


RDW   


 


Plt Count   


 


MPV   


 


Neut % (Auto)   


 


Lymph % (Auto)   


 


Mono % (Auto)   


 


Eos % (Auto)   


 


Baso % (Auto)   


 


Neut #   


 


Lymph #   


 


Mono #   


 


Eos #   


 


Baso #   


 


PT  14.0 H  


 


INR  1.4 H  


 


APTT  28.9  


 


pO2    70 H


 


VBG pH    7.50 H


 


VBG pCO2    30 L


 


VBG HCO3    25.7


 


VBG Total CO2    24.3


 


VBG O2 Sat (Calc)    98.7 H


 


VBG Base Excess    1.1


 


Glucose    112 H


 


Lactate    0.9


 


FiO2    21.0


 


Crit Value Called To    Dr markel novoa


 


Crit Value Called By    Rt


 


Crit Value Read Back    Y


 


Blood Gas Notified Time    2235


 


Sodium    181.0 H*


 


Potassium   


 


Chloride    123.0 H


 


Carbon Dioxide   


 


Anion Gap   


 


BUN   


 


Creatinine   


 


Est GFR ( Amer)   


 


Est GFR (Non-Af Amer)   


 


POC Glucose (mg/dL)   


 


Random Glucose   


 


Calcium   


 


Iron   


 


TIBC   


 


% Saturation   


 


Ferritin   


 


Total Bilirubin   


 


AST   


 


ALT   


 


Alkaline Phosphatase   


 


Troponin I   


 


NT-Pro-B Natriuret Pep   


 


Total Protein   


 


Albumin   


 


Globulin   


 


Albumin/Globulin Ratio   


 


Vitamin B12   


 


Folate   


 


Stool Occult Blood   


 


Influenza Typ A,B (EIA)   Negative for flu a/b 














  08/06/17 08/06/17 08/06/17





  05:22 05:30 05:30


 


WBC    8.6


 


RBC    3.69 L


 


Hgb    9.3 L


 


Hct    28.3 L


 


MCV    76.8 L


 


MCH    25.2 L


 


MCHC    32.8 L


 


RDW    15.2 H


 


Plt Count    319


 


MPV   


 


Neut % (Auto)   


 


Lymph % (Auto)   


 


Mono % (Auto)   


 


Eos % (Auto)   


 


Baso % (Auto)   


 


Neut #   


 


Lymph #   


 


Mono #   


 


Eos #   


 


Baso #   


 


PT   


 


INR   


 


APTT   


 


pO2   


 


VBG pH   


 


VBG pCO2   


 


VBG HCO3   


 


VBG Total CO2   


 


VBG O2 Sat (Calc)   


 


VBG Base Excess   


 


Glucose   


 


Lactate   


 


FiO2   


 


Crit Value Called To   


 


Crit Value Called By   


 


Crit Value Read Back   


 


Blood Gas Notified Time   


 


Sodium   Cancelled 


 


Potassium   Cancelled 


 


Chloride   Cancelled 


 


Carbon Dioxide   Cancelled 


 


Anion Gap   Cancelled 


 


BUN   Cancelled 


 


Creatinine   Cancelled 


 


Est GFR ( Amer)   Cancelled 


 


Est GFR (Non-Af Amer)   Cancelled 


 


POC Glucose (mg/dL)  105  


 


Random Glucose   Cancelled 


 


Calcium   Cancelled 


 


Iron   


 


TIBC   


 


% Saturation   


 


Ferritin   


 


Total Bilirubin   Cancelled 


 


AST   Cancelled 


 


ALT   Cancelled 


 


Alkaline Phosphatase   Cancelled 


 


Troponin I   Cancelled 


 


NT-Pro-B Natriuret Pep   


 


Total Protein   Cancelled 


 


Albumin   Cancelled 


 


Globulin   Cancelled 


 


Albumin/Globulin Ratio   Cancelled 


 


Vitamin B12   


 


Folate   


 


Stool Occult Blood   


 


Influenza Typ A,B (EIA)   














  08/06/17 08/06/17 08/06/17





  11:17 15:00 16:36


 


WBC   


 


RBC   


 


Hgb   


 


Hct   


 


MCV   


 


MCH   


 


MCHC   


 


RDW   


 


Plt Count   


 


MPV   


 


Neut % (Auto)   


 


Lymph % (Auto)   


 


Mono % (Auto)   


 


Eos % (Auto)   


 


Baso % (Auto)   


 


Neut #   


 


Lymph #   


 


Mono #   


 


Eos #   


 


Baso #   


 


PT   


 


INR   


 


APTT   


 


pO2   


 


VBG pH   


 


VBG pCO2   


 


VBG HCO3   


 


VBG Total CO2   


 


VBG O2 Sat (Calc)   


 


VBG Base Excess   


 


Glucose   


 


Lactate   


 


FiO2   


 


Crit Value Called To   


 


Crit Value Called By   


 


Crit Value Read Back   


 


Blood Gas Notified Time   


 


Sodium   


 


Potassium   


 


Chloride   


 


Carbon Dioxide   


 


Anion Gap   


 


BUN   


 


Creatinine   


 


Est GFR ( Amer)   


 


Est GFR (Non-Af Amer)   


 


POC Glucose (mg/dL)  116 H   112 H


 


Random Glucose   


 


Calcium   


 


Iron   


 


TIBC   


 


% Saturation   


 


Ferritin   


 


Total Bilirubin   


 


AST   


 


ALT   


 


Alkaline Phosphatase   


 


Troponin I   0.0250 


 


NT-Pro-B Natriuret Pep   


 


Total Protein   


 


Albumin   


 


Globulin   


 


Albumin/Globulin Ratio   


 


Vitamin B12   


 


Folate   


 


Stool Occult Blood   


 


Influenza Typ A,B (EIA)   














  08/07/17 08/07/17 08/07/17





  00:00 05:42 06:40


 


WBC    7.5


 


RBC    3.50 L


 


Hgb    8.8 L


 


Hct    27.4 L


 


MCV    78.4 L


 


MCH    25.2 L


 


MCHC    32.2 L


 


RDW    15.6 H


 


Plt Count    287


 


MPV   


 


Neut % (Auto)   


 


Lymph % (Auto)   


 


Mono % (Auto)   


 


Eos % (Auto)   


 


Baso % (Auto)   


 


Neut #   


 


Lymph #   


 


Mono #   


 


Eos #   


 


Baso #   


 


PT   


 


INR   


 


APTT   


 


pO2   


 


VBG pH   


 


VBG pCO2   


 


VBG HCO3   


 


VBG Total CO2   


 


VBG O2 Sat (Calc)   


 


VBG Base Excess   


 


Glucose   


 


Lactate   


 


FiO2   


 


Crit Value Called To   


 


Crit Value Called By   


 


Crit Value Read Back   


 


Blood Gas Notified Time   


 


Sodium  140  


 


Potassium  4.4  


 


Chloride  108 H  


 


Carbon Dioxide  22  


 


Anion Gap  14  


 


BUN  23 H  


 


Creatinine  1.5  


 


Est GFR ( Amer)  56  


 


Est GFR (Non-Af Amer)  47  


 


POC Glucose (mg/dL)   103 


 


Random Glucose  98  


 


Calcium  8.3 L  


 


Iron   


 


TIBC   


 


% Saturation   


 


Ferritin   


 


Total Bilirubin  0.5  


 


AST  18  


 


ALT  27  


 


Alkaline Phosphatase  63  


 


Troponin I  0.0410  


 


NT-Pro-B Natriuret Pep   


 


Total Protein  6.9  


 


Albumin  3.3 L  


 


Globulin  3.6  


 


Albumin/Globulin Ratio  0.9 L  


 


Vitamin B12   


 


Folate   


 


Stool Occult Blood   


 


Influenza Typ A,B (EIA)   














  08/07/17 08/07/17 08/07/17





  06:40 10:30 10:30


 


WBC   


 


RBC   


 


Hgb   


 


Hct   


 


MCV   


 


MCH   


 


MCHC   


 


RDW   


 


Plt Count   


 


MPV   


 


Neut % (Auto)   


 


Lymph % (Auto)   


 


Mono % (Auto)   


 


Eos % (Auto)   


 


Baso % (Auto)   


 


Neut #   


 


Lymph #   


 


Mono #   


 


Eos #   


 


Baso #   


 


PT   


 


INR   


 


APTT   


 


pO2   


 


VBG pH   


 


VBG pCO2   


 


VBG HCO3   


 


VBG Total CO2   


 


VBG O2 Sat (Calc)   


 


VBG Base Excess   


 


Glucose   


 


Lactate   


 


FiO2   


 


Crit Value Called To   


 


Crit Value Called By   


 


Crit Value Read Back   


 


Blood Gas Notified Time   


 


Sodium  139  


 


Potassium  4.4  


 


Chloride  108 H  


 


Carbon Dioxide  23  


 


Anion Gap  12  


 


BUN  23 H  


 


Creatinine  1.5  


 


Est GFR ( Amer)  56  


 


Est GFR (Non-Af Amer)  47  


 


POC Glucose (mg/dL)   


 


Random Glucose  98  


 


Calcium  8.5  


 


Iron   24 L 


 


TIBC   211 L 


 


% Saturation   11 L 


 


Ferritin    146.0


 


Total Bilirubin  0.5  


 


AST  19  


 


ALT  26  


 


Alkaline Phosphatase  64  


 


Troponin I   


 


NT-Pro-B Natriuret Pep   


 


Total Protein  7.0  


 


Albumin  3.4 L  


 


Globulin  3.6  


 


Albumin/Globulin Ratio  0.9 L  


 


Vitamin B12    523


 


Folate    10.2


 


Stool Occult Blood   


 


Influenza Typ A,B (EIA)   














  08/07/17 08/07/17 08/07/17





  11:44 12:45 16:15


 


WBC   


 


RBC   


 


Hgb   


 


Hct   


 


MCV   


 


MCH   


 


MCHC   


 


RDW   


 


Plt Count   


 


MPV   


 


Neut % (Auto)   


 


Lymph % (Auto)   


 


Mono % (Auto)   


 


Eos % (Auto)   


 


Baso % (Auto)   


 


Neut #   


 


Lymph #   


 


Mono #   


 


Eos #   


 


Baso #   


 


PT   


 


INR   


 


APTT   


 


pO2   


 


VBG pH   


 


VBG pCO2   


 


VBG HCO3   


 


VBG Total CO2   


 


VBG O2 Sat (Calc)   


 


VBG Base Excess   


 


Glucose   


 


Lactate   


 


FiO2   


 


Crit Value Called To   


 


Crit Value Called By   


 


Crit Value Read Back   


 


Blood Gas Notified Time   


 


Sodium   


 


Potassium   


 


Chloride   


 


Carbon Dioxide   


 


Anion Gap   


 


BUN   


 


Creatinine   


 


Est GFR ( Amer)   


 


Est GFR (Non-Af Amer)   


 


POC Glucose (mg/dL)  152 H   104


 


Random Glucose   


 


Calcium   


 


Iron   


 


TIBC   


 


% Saturation   


 


Ferritin   


 


Total Bilirubin   


 


AST   


 


ALT   


 


Alkaline Phosphatase   


 


Troponin I   0.0160 


 


NT-Pro-B Natriuret Pep   


 


Total Protein   


 


Albumin   


 


Globulin   


 


Albumin/Globulin Ratio   


 


Vitamin B12   


 


Folate   


 


Stool Occult Blood   


 


Influenza Typ A,B (EIA)   














  08/07/17 08/08/17 08/08/17





  21:04 05:28 Unknown


 


WBC   


 


RBC   


 


Hgb   


 


Hct   


 


MCV   


 


MCH   


 


MCHC   


 


RDW   


 


Plt Count   


 


MPV   


 


Neut % (Auto)   


 


Lymph % (Auto)   


 


Mono % (Auto)   


 


Eos % (Auto)   


 


Baso % (Auto)   


 


Neut #   


 


Lymph #   


 


Mono #   


 


Eos #   


 


Baso #   


 


PT   


 


INR   


 


APTT   


 


pO2   


 


VBG pH   


 


VBG pCO2   


 


VBG HCO3   


 


VBG Total CO2   


 


VBG O2 Sat (Calc)   


 


VBG Base Excess   


 


Glucose   


 


Lactate   


 


FiO2   


 


Crit Value Called To   


 


Crit Value Called By   


 


Crit Value Read Back   


 


Blood Gas Notified Time   


 


Sodium   


 


Potassium   


 


Chloride   


 


Carbon Dioxide   


 


Anion Gap   


 


BUN   


 


Creatinine   


 


Est GFR ( Amer)   


 


Est GFR (Non-Af Amer)   


 


POC Glucose (mg/dL)  145 H  94 


 


Random Glucose   


 


Calcium   


 


Iron   


 


TIBC   


 


% Saturation   


 


Ferritin   


 


Total Bilirubin   


 


AST   


 


ALT   


 


Alkaline Phosphatase   


 


Troponin I   


 


NT-Pro-B Natriuret Pep   


 


Total Protein   


 


Albumin   


 


Globulin   


 


Albumin/Globulin Ratio   


 


Vitamin B12   


 


Folate   


 


Stool Occult Blood    Positive H


 


Influenza Typ A,B (EIA)   








 Microbiology





08/05/17 06:45   Urine,Trujillo   Urine Culture - Final


                            Klebsiella Pneumoniae Ssp Pneu


08/05/17 19:45   Blood   Blood Culture - Preliminary


                            NO GROWTH AFTER 48 HOURS


08/05/17 20:00   Blood   Blood Culture - Preliminary


                            NO GROWTH AFTER 48 HOURS





Microbiology





07/19/17 06:15   Urine,Trujillo   Urine Culture - Final


                              ,000 CFU/ML.


                              MULTIPLE SPECIES. SUGGEST REPEAT SPECIMEM.


07/19/17 06:15   Blood   Blood Culture - Final


07/19/17 06:15   Blood   Gram Stain - Final


                              NO GROWTH AFTER 5 DAYS


                              TEST NOT PERFORMED





 


 





Accession No. : [id number]


Patient Name / ID : GARRISON DELAROSAUH  / [id number]


Exam Date : 08/05/2017 22:07:21 ( Approved )


Study Comment : 


Sex / Age : M  / 66Y





Creator : Wade Abad MD


Dictator : Wade Abad MD


 : 


Approver : Wade Abad MD


Approver2 : 





Report Date : 08/06/2017 07:24:12


My Comment : 


********************************************************************************

***





HISTORY:


 chest pain 





COMPARISON:


No prior. 





FINDINGS:





LUNGS:


No active pulmonary disease. Linear fibrosis at the right base. 





PLEURA:


No significant pleural effusion identified, no pneumothorax apparent.





CARDIOVASCULAR:


Normal. Atherosclerotic aorta. 





OSSEOUS STRUCTURES:


No significant abnormalities.





VISUALIZED UPPER ABDOMEN:


Normal.





OTHER FINDINGS:


None.





IMPRESSION:


No active disease.





Assessment & Plan


(1) NSTEMI (non-ST elevated myocardial infarction)


Status: Acute   Priority: High   





(2) Fever


Status: Acute   





(3) Acute heart failure


Status: Acute   Priority: High   





(4) H/O inguinal hernia repair


Status: Acute   





(5) Infection due to ESBL-producing Klebsiella pneumoniae


Status: Acute   





- Assessment and Plan (Free Text)


Assessment: 





A/P-


66 year old male with multiple medical conditions including CAD, CHF, DM II, 

recent inguinal hernia repair and has been with indwelling trujillo cath with leg 

bag since then was admitted with dyspnea and chest pain .


s/p cardiac cath today found to have multivessel cardiac disease not amenable 

to stenting.








pt. had one documented fever in ED but has been afebrile since then.


He does not have any UA on this admission


but he has urine cx which was reported today as ESBL e.ccoli.


blood cx- negative


his UA from the other hospit on 6/14/2017 was pos for nitrate and LE.


as per med records pt. was given zosyn, and a dose of gent .





plan-


based on the fact that he has been having indwelling trujillo cath for past 3 

months with once a month being changed as per pt and the fact that he had 

recent hernia surgery with mesh still i place, advise to treat the ESBL 

klebsiella in the urine cx eventhough normal wbc count.


would advise to send UA and urine cx again now.


advise to have pt. be seen by urologist to ascertain if trujillo cath can be 

removed  as that can cause infection.


may need bladder scan to see if there is any retention .


pt. denies any allergy to any meds and denies any allergy to PCN.


Hence advise to start pt. on Meropenem 1 gram q12 hours.


advise CT abd/pelvis to make sure the fluid collection that was seen on 

previous admission CT around hernia repair site is resolved.








 thank you for allowing me to take part in the care of this patient.





 all above d/w patient and he verbalizes full understanding of all above and 

agrees with above plan of care.

## 2017-08-08 NOTE — CP.PCM.PN
Subjective





- Date & Time of Evaluation


Date of Evaluation: 08/08/17


Time of Evaluation: 11:30





- Subjective


Subjective: 





cardaic cath performed today.





Severe multivessel CAD, normal LV function.


Optimum therapy is CABG.  I discussed the findings and the need for CABG.  

Patient is reluctant to have surgery.  He will need treatment for ESBL in the 

urine.





Objective





- Vital Signs/Intake and Output


Vital Signs (last 24 hours): 


 











Temp Pulse Resp BP Pulse Ox


 


 98.1 F   66   16   177/92 H  100 


 


 08/08/17 16:16  08/08/17 17:21  08/08/17 16:16  08/08/17 17:21  08/08/17 16:16








Intake and Output: 


 











 08/08/17 08/09/17





 18:59 06:59


 


Intake Total 850 


 


Output Total 1000 


 


Balance -150 














- Medications


Medications: 


 Current Medications





Acetaminophen (Tylenol 325mg Tab)  650 mg PO Q6 PRN


   PRN Reason: Temperature (101.0)


Aspirin (Aspirin Chewable)  81 mg PO DAILY [facility]


   Last Admin: 08/08/17 08:16 Dose:  Not Given


Atorvastatin Calcium (Lipitor)  40 mg PO DAILY [facility]


   Last Admin: 08/08/17 08:17 Dose:  Not Given


Docusate Sodium (Colace)  100 mg PO BID [facility]


   Last Admin: 08/08/17 17:19 Dose:  100 mg


Enalapril Maleate (Vasotec)  5 mg PO Q12 [facility]


Enoxaparin Sodium (Lovenox)  40 mg SC DAILY [facility]


   PRN Reason: Protocol


   Last Admin: 08/08/17 08:17 Dose:  Not Given


Ferrous Sulfate (Feosol)  325 mg PO BID [facility]


   Last Admin: 08/08/17 17:19 Dose:  325 mg


Piperacillin Sod/Tazobactam (Sod 3.375 gm/ Sodium Chloride)  100 mls @ 100 mls/

hr IVPB Q6 [facility]


   Last Admin: 08/08/17 17:17 Dose:  100 mls/hr


Ibuprofen (Motrin Tab)  400 mg PO Q6 PRN


   PRN Reason: Pain, Mild (1-3)


Insulin Human Lispro (Humalog)  0 units SC ACHS [facility]


   PRN Reason: Protocol


   Last Admin: 08/08/17 17:19 Dose:  Not Given


Meclizine HCl (Antivert)  25 mg PO Q8 PRN


   PRN Reason: Nausea/Vomiting


Metoprolol Tartrate (Lopressor)  25 mg PO Q12 [facility]


Polyethylene Glycol (Miralax)  17 gm PO DAILY PRN


   PRN Reason: Constipation


Propranolol HCl (Inderal)  30 mg PO DAILY [facility]


   Last Admin: 08/08/17 17:20 Dose:  30 mg











- Labs


Labs: 


 





 08/07/17 06:40 





 08/07/17 06:40 





 











PT  14.0 Seconds (9.8-13.1)  H  08/05/17  22:08    


 


INR  1.4  (0.9-1.2)  H  08/05/17  22:08    


 


APTT  28.9 Seconds (25.6-37.1)   08/05/17  22:08    














Assessment and Plan


(1) Unstable angina


Status: Acute   





(2) CHF (congestive heart failure)


Status: Chronic   





(3) Hypertension


Status: Chronic   





(4) Type 2 diabetes mellitus


Status: Chronic

## 2017-08-08 NOTE — CP.PCM.PN
<Elisa Nolan - Last Filed: 08/08/17 17:22>





Subjective





- Date & Time of Evaluation


Date of Evaluation: 08/08/17


Time of Evaluation: 07:05





- Subjective


Subjective: 


Patient was seen and examined with attending


patient denies chest pain, SOB, N/V, chills, or other complains at this 

evaluation


Afebrile overnight, no night events reported


patient id NPO for Cardiac catheterization at [facility]





Objective





- Vital Signs/Intake and Output


Vital Signs (last 24 hours): 


 











Temp Pulse Resp BP Pulse Ox


 


 98.7 F   66   18   186/96 H  99 


 


 08/08/17 08:00  08/08/17 08:15  08/08/17 08:00  08/08/17 08:15  08/08/17 08:00








Intake and Output: 


 











 08/08/17 08/08/17





 06:59 18:59


 


Intake Total 300 


 


Output Total 750 


 


Balance -450 














- Medications


Medications: 


 Current Medications





Acetaminophen (Tylenol 325mg Tab)  650 mg PO Q6 PRN


   PRN Reason: Temperature (101.0)


Aspirin (Aspirin Chewable)  81 mg PO DAILY [facility]


   Last Admin: 08/08/17 08:16 Dose:  Not Given


Atorvastatin Calcium (Lipitor)  40 mg PO DAILY [facility]


   Last Admin: 08/08/17 08:17 Dose:  Not Given


Docusate Sodium (Colace)  100 mg PO BID [facility]


   Last Admin: 08/08/17 08:17 Dose:  Not Given


Enalapril Maleate (Vasotec)  5 mg PO DAILY [facility]


   Last Admin: 08/08/17 08:18 Dose:  Not Given


Enoxaparin Sodium (Lovenox)  40 mg SC DAILY [facility]


   PRN Reason: Protocol


   Last Admin: 08/08/17 08:17 Dose:  Not Given


Ferrous Sulfate (Feosol)  325 mg PO BID [facility]


   Last Admin: 08/08/17 08:17 Dose:  Not Given


Piperacillin Sod/Tazobactam (Sod 3.375 gm/ Sodium Chloride)  100 mls @ 100 mls/

hr IVPB Q6 [facility]


   Last Admin: 08/08/17 10:57 Dose:  Not Given


Ibuprofen (Motrin Tab)  400 mg PO Q6 PRN


   PRN Reason: Pain, Mild (1-3)


Insulin Human Lispro (Humalog)  0 units SC ACHS [facility]


   PRN Reason: Protocol


   Last Admin: 08/08/17 11:30 Dose:  Not Given


Meclizine HCl (Antivert)  25 mg PO Q8 PRN


   PRN Reason: Nausea/Vomiting


Metoprolol Tartrate (Lopressor)  25 mg PO DAILY [facility]


   Last Admin: 08/08/17 08:17 Dose:  Not Given


Polyethylene Glycol (Miralax)  17 gm PO DAILY PRN


   PRN Reason: Constipation


Propranolol HCl (Inderal)  30 mg PO DAILY [facility]


   Last Admin: 08/08/17 08:17 Dose:  Not Given











- Labs


Labs: 


 





 08/07/17 06:40 





 08/07/17 06:40 





 











PT  14.0 Seconds (9.8-13.1)  H  08/05/17  22:08    


 


INR  1.4  (0.9-1.2)  H  08/05/17  22:08    


 


APTT  28.9 Seconds (25.6-37.1)   08/05/17  22:08    














- Constitutional


Appears: No Acute Distress





- Eye Exam


Eye Exam: Normal appearance





- ENT Exam


ENT Exam: Mucous Membranes Moist





- Respiratory Exam


Respiratory Exam: Clear to Ausculation Bilateral, NORMAL BREATHING PATTERN





- Cardiovascular Exam


Cardiovascular Exam: REGULAR RHYTHM, +S1, +S2





- GI/Abdominal Exam


GI & Abdominal Exam: Soft, Normal Bowel Sounds.  absent: Distended, Guarding, 

Rigid, Tenderness





- Extremities Exam


Extremities Exam: Normal Inspection.  absent: Calf Tenderness, Pedal Edema





- Neurological Exam


Neurological Exam: Alert, Awake, Oriented x3





- Psychiatric Exam


Psychiatric exam: Normal Affect, Normal Mood





- Skin


Skin Exam: Dry, Intact, Pallor





Assessment and Plan


(1) Unstable angina


Status: Acute   





(2) CHF (congestive heart failure)


Status: Chronic   





(3) Hypertension


Status: Chronic   





(4) Type 2 diabetes mellitus


Status: Chronic   





- Assessment and Plan (Free Text)


Plan: 


NPO for cardiac catheterization at [facility] today by Dr. Connors


f/u [facility] Cath results and cardiology recommendations


f/u morning labs


f/u cultures


c/w current management








<Brenden Rascon K - Last Filed: 08/11/17 13:17>





Objective





- Vital Signs/Intake and Output


Vital Signs (last 24 hours): 


 











Temp Pulse Resp BP Pulse Ox


 


 97.6 F   87   20   138/74   96 


 


 08/09/17 15:53  08/09/17 15:53  08/09/17 15:53  08/09/17 15:53  08/09/17 15:53











- Labs


Labs: 


 





 08/09/17 05:00 





 08/09/17 05:00 





 











PT  14.0 Seconds (9.8-13.1)  H  08/05/17  22:08    


 


INR  1.4  (0.9-1.2)  H  08/05/17  22:08    


 


APTT  28.9 Seconds (25.6-37.1)   08/05/17  22:08    














Assessment and Plan





- Assessment and Plan (Free Text)


Assessment: 





 Patient was personally seen and examined by me in rounds with residents.


Available labs and diagnostic data reviewed.


Case, patient's condition and management plan discussed with residents in 

rounds.


Agree with resident's progress not.


Plan: As ordered.

## 2017-08-09 VITALS
DIASTOLIC BLOOD PRESSURE: 74 MMHG | TEMPERATURE: 97.6 F | OXYGEN SATURATION: 96 % | SYSTOLIC BLOOD PRESSURE: 138 MMHG | HEART RATE: 87 BPM

## 2017-08-09 VITALS — RESPIRATION RATE: 20 BRPM

## 2017-08-09 LAB
BACTERIA #/AREA URNS HPF: (no result) /[HPF]
BILIRUB UR-MCNC: NEGATIVE MG/DL
BUN SERPL-MCNC: 17 MG/DL (ref 9–20)
CALCIUM SERPL-MCNC: 8.6 MG/DL (ref 8.4–10.2)
COLOR UR: YELLOW
ERYTHROCYTE [DISTWIDTH] IN BLOOD BY AUTOMATED COUNT: 15.7 % (ref 11.5–14.5)
GFR NON-AFRICAN AMERICAN: 55
GLUCOSE UR STRIP-MCNC: (no result) MG/DL
HGB BLD-MCNC: 9.7 G/DL (ref 12–18)
LEUKOCYTE ESTERASE UR-ACNC: (no result) LEU/UL
MCH RBC QN AUTO: 25.5 PG (ref 27–31)
MCHC RBC AUTO-ENTMCNC: 32.6 G/DL (ref 33–37)
MCV RBC AUTO: 78.4 FL (ref 80–94)
PH UR STRIP: 5 [PH] (ref 5–8)
PLATELET # BLD: 336 K/UL (ref 130–400)
PROT UR STRIP-MCNC: 30 MG/DL
RBC # BLD AUTO: 3.79 MIL/UL (ref 4.4–5.9)
RBC # UR STRIP: (no result) /UL
SP GR UR STRIP: 1.02 (ref 1–1.03)
SQUAMOUS EPITHIAL: < 1 /HPF (ref 0–5)
URINE CLARITY: (no result)
URINE NITRATE: POSITIVE
UROBILINOGEN UR-MCNC: (no result) MG/DL (ref 0.2–1)
WBC # BLD AUTO: 5.7 K/UL (ref 4.8–10.8)

## 2017-08-09 RX ADMIN — INSULIN LISPRO SCH: 100 INJECTION, SOLUTION INTRAVENOUS; SUBCUTANEOUS at 17:28

## 2017-08-09 RX ADMIN — INSULIN LISPRO SCH: 100 INJECTION, SOLUTION INTRAVENOUS; SUBCUTANEOUS at 17:29

## 2017-08-09 RX ADMIN — INSULIN LISPRO SCH: 100 INJECTION, SOLUTION INTRAVENOUS; SUBCUTANEOUS at 07:04

## 2017-08-09 RX ADMIN — ENOXAPARIN SODIUM SCH MG: 40 INJECTION SUBCUTANEOUS at 09:06

## 2017-08-09 NOTE — CP.PCM.PN
Subjective





- Date & Time of Evaluation


Date of Evaluation: 08/09/17


Time of Evaluation: 11:08





Objective





- Vital Signs/Intake and Output


Vital Signs (last 24 hours): 


 











Temp Pulse Resp BP Pulse Ox


 


 98.7 F   65   20   154/84 H  100 


 


 08/09/17 08:00  08/09/17 08:50  08/09/17 08:00  08/09/17 08:50  08/09/17 08:00








Intake and Output: 


 











 08/09/17 08/09/17





 06:59 18:59


 


Intake Total 350 


 


Output Total 800 


 


Balance -450 














- Medications


Medications: 


 Current Medications





Acetaminophen (Tylenol 325mg Tab)  650 mg PO Q6 PRN


   PRN Reason: Temperature (101.0)


Aspirin (Aspirin Chewable)  81 mg PO DAILY [facility]


   Last Admin: 08/09/17 09:06 Dose:  81 mg


Atorvastatin Calcium (Lipitor)  40 mg PO DAILY [facility]


   Last Admin: 08/09/17 09:06 Dose:  40 mg


Docusate Sodium (Colace)  100 mg PO BID [facility]


   Last Admin: 08/09/17 08:48 Dose:  100 mg


Enalapril Maleate (Vasotec)  5 mg PO Q12 [facility]


   Last Admin: 08/09/17 09:06 Dose:  5 mg


Enoxaparin Sodium (Lovenox)  40 mg SC DAILY [facility]


   PRN Reason: Protocol


   Last Admin: 08/09/17 09:06 Dose:  40 mg


Ferrous Sulfate (Feosol)  325 mg PO BID [facility]


   Last Admin: 08/09/17 08:48 Dose:  325 mg


Meropenem 1 gm/ Sodium (Chloride)  100 mls @ 100 mls/hr IVPB Q12 [facility]


   Last Admin: 08/09/17 09:07 Dose:  100 mls/hr


Ibuprofen (Motrin Tab)  400 mg PO Q6 PRN


   PRN Reason: Pain, Mild (1-3)


   Last Admin: 08/09/17 08:50 Dose:  400 mg


Insulin Human Lispro (Humalog)  0 units SC ACHS [facility]


   PRN Reason: Protocol


   Last Admin: 08/09/17 07:04 Dose:  Not Given


Meclizine HCl (Antivert)  25 mg PO Q8 PRN


   PRN Reason: Nausea/Vomiting


   Last Admin: 08/09/17 09:06 Dose:  25 mg


Metoprolol Tartrate (Lopressor)  25 mg PO Q12 [facility]


   Last Admin: 08/09/17 08:50 Dose:  25 mg


Polyethylene Glycol (Miralax)  17 gm PO DAILY PRN


   PRN Reason: Constipation


Propranolol HCl (Inderal)  30 mg PO DAILY [facility]


   Last Admin: 08/09/17 08:47 Dose:  30 mg











- Labs


Labs: 


 





 08/09/17 05:00 





 08/09/17 05:00 





 











PT  14.0 Seconds (9.8-13.1)  H  08/05/17  22:08    


 


INR  1.4  (0.9-1.2)  H  08/05/17  22:08    


 


APTT  28.9 Seconds (25.6-37.1)   08/05/17  22:08

## 2017-08-09 NOTE — CP.PCM.PN
<Elisa Nolan - Last Filed: 08/09/17 08:57>





Subjective





- Date & Time of Evaluation


Date of Evaluation: 08/09/17


Time of Evaluation: 08:45





- Subjective


Subjective: 


patient went yesterday for cardiac catheterization by Dr. Connors


patient denies Cp, SOB, N/V, abdominal pain or other complains at this 

evaluation


No events overnight


patient seen and examined with attending





Objective





- Vital Signs/Intake and Output


Vital Signs (last 24 hours): 


 











Temp Pulse Resp BP Pulse Ox


 


 98.7 F   65   20   154/84 H  100 


 


 08/09/17 08:00  08/09/17 08:50  08/09/17 08:00  08/09/17 08:50  08/09/17 08:00








Intake and Output: 


 











 08/09/17 08/09/17





 06:59 18:59


 


Intake Total 350 


 


Output Total 800 


 


Balance -450 














- Medications


Medications: 


 Current Medications





Acetaminophen (Tylenol 325mg Tab)  650 mg PO Q6 PRN


   PRN Reason: Temperature (101.0)


Aspirin (Aspirin Chewable)  81 mg PO DAILY [facility]


   Last Admin: 08/08/17 08:16 Dose:  Not Given


Atorvastatin Calcium (Lipitor)  40 mg PO DAILY [facility]


   Last Admin: 08/08/17 08:17 Dose:  Not Given


Docusate Sodium (Colace)  100 mg PO BID [facility]


   Last Admin: 08/09/17 08:48 Dose:  100 mg


Enalapril Maleate (Vasotec)  5 mg PO Q12 [facility]


   Last Admin: 08/08/17 21:09 Dose:  5 mg


Enoxaparin Sodium (Lovenox)  40 mg SC DAILY [facility]


   PRN Reason: Protocol


   Last Admin: 08/08/17 08:17 Dose:  Not Given


Ferrous Sulfate (Feosol)  325 mg PO BID [facility]


   Last Admin: 08/09/17 08:48 Dose:  325 mg


Meropenem 1 gm/ Sodium (Chloride)  100 mls @ 100 mls/hr IVPB Q12 [facility]


   Last Admin: 08/08/17 21:11 Dose:  100 mls/hr


Ibuprofen (Motrin Tab)  400 mg PO Q6 PRN


   PRN Reason: Pain, Mild (1-3)


   Last Admin: 08/09/17 08:50 Dose:  400 mg


Insulin Human Lispro (Humalog)  0 units SC ACHS [facility]


   PRN Reason: Protocol


   Last Admin: 08/09/17 07:04 Dose:  Not Given


Meclizine HCl (Antivert)  25 mg PO Q8 PRN


   PRN Reason: Nausea/Vomiting


Metoprolol Tartrate (Lopressor)  25 mg PO Q12 [facility]


   Last Admin: 08/09/17 08:50 Dose:  25 mg


Polyethylene Glycol (Miralax)  17 gm PO DAILY PRN


   PRN Reason: Constipation


Propranolol HCl (Inderal)  30 mg PO DAILY [facility]


   Last Admin: 08/09/17 08:47 Dose:  30 mg











- Labs


Labs: 


 





 08/09/17 05:00 





 08/09/17 05:00 





 











PT  14.0 Seconds (9.8-13.1)  H  08/05/17  22:08    


 


INR  1.4  (0.9-1.2)  H  08/05/17  22:08    


 


APTT  28.9 Seconds (25.6-37.1)   08/05/17  22:08    














- Additional Findings


Additional findings: 





Constitutional


Appears: No Acute Distress





- Eye Exam


Eye Exam: Normal appearance





- ENT Exam


ENT Exam: Mucous Membranes Moist





- Respiratory Exam


Respiratory Exam: Clear to Ausculation Bilateral, NORMAL BREATHING PATTERN





- Cardiovascular Exam


Cardiovascular Exam: REGULAR RHYTHM, +S1, +S2





- GI/Abdominal Exam


GI & Abdominal Exam: Soft, Normal Bowel Sounds.  absent: Distended, Guarding, 

Rigid, Tenderness





- Extremities Exam


Extremities Exam: Normal Inspection.  absent: Calf Tenderness, Pedal Edema





- Neurological Exam


Neurological Exam: Alert, Awake, Oriented x3





- Psychiatric Exam


Psychiatric exam: Normal Affect, Normal Mood





- Skin


Skin Exam: Dry, Intact, Pallor





Assessment and Plan


(1) Unstable angina


Assessment & Plan: 


As per Dr. Newell Cardiac cath showed. : Severe multivessel CAD, normal LV 

function.


Optimum therapy is CABG.


Patient is reluctant to have surgery


f/u Dr. Connors recommendations


Status: Acute   





(2) CHF (congestive heart failure)


Assessment & Plan: 


controlled


Status: Chronic   





(3) Hypertension


Status: Chronic   





(4) Type 2 diabetes mellitus


Status: Chronic   





(5) Urinary tract infection due to ESBL Klebsiella


Assessment & Plan: 


c/w Meropenem IVP Q 12


Status: Acute   





<Rascon,Brenden K - Last Filed: 08/11/17 13:24>





Objective





- Vital Signs/Intake and Output


Vital Signs (last 24 hours): 


 











Temp Pulse Resp BP Pulse Ox


 


 97.6 F   87   20   138/74   96 


 


 08/09/17 15:53  08/09/17 15:53  08/09/17 15:53  08/09/17 15:53  08/09/17 15:53











- Labs


Labs: 


 





 08/09/17 05:00 





 08/09/17 05:00 





 











PT  14.0 Seconds (9.8-13.1)  H  08/05/17  22:08    


 


INR  1.4  (0.9-1.2)  H  08/05/17  22:08    


 


APTT  28.9 Seconds (25.6-37.1)   08/05/17  22:08    














Assessment and Plan





- Assessment and Plan (Free Text)


Assessment: 





 Patient was personally seen and examined by me in rounds with residents.


Available labs and diagnostic data reviewed.


Case, patient's condition and management plan discussed with residents in 

rounds.


Agree with resident's progress not.


Plan: As ordered.

## 2017-10-07 ENCOUNTER — HOSPITAL ENCOUNTER (EMERGENCY)
Dept: HOSPITAL 42 - ED | Age: 67
Discharge: HOME | End: 2017-10-07
Payer: MEDICARE

## 2017-10-07 VITALS — TEMPERATURE: 99.1 F

## 2017-10-07 VITALS
SYSTOLIC BLOOD PRESSURE: 147 MMHG | RESPIRATION RATE: 16 BRPM | DIASTOLIC BLOOD PRESSURE: 91 MMHG | OXYGEN SATURATION: 99 % | HEART RATE: 54 BPM

## 2017-10-07 DIAGNOSIS — Z46.6: Primary | ICD-10-CM

## 2017-10-07 LAB
AMORPH SED URNS QL MICRO: (no result)
APPEARANCE UR: (no result)
BACTERIA #/AREA URNS HPF: (no result) /[HPF]
BILIRUB UR-MCNC: NEGATIVE MG/DL
COLOR UR: (no result)
EPI CELLS #/AREA URNS HPF: (no result) /HPF (ref 0–5)
GLUCOSE UR STRIP-MCNC: NEGATIVE MG/DL
KETONES UR STRIP-MCNC: NEGATIVE MG/DL
LEUKOCYTE ESTERASE UR-ACNC: (no result) LEU/UL
PH UR STRIP: 5.5 [PH] (ref 4.7–8)
PROT UR STRIP-MCNC: 100 MG/DL
RBC # UR STRIP: (no result) /UL
RBC #/AREA URNS HPF: (no result) /HPF (ref 0–2)
RBC CASTS #/AREA URNS HPF: (no result) /HPF
SP GR UR STRIP: 1.02 (ref 1–1.03)
UROBILINOGEN UR STRIP-ACNC: 0.2 E.U./DL
WBC #/AREA URNS HPF: (no result) /HPF (ref 0–6)

## 2017-10-07 NOTE — ED PDOC
Addendum entered and electronically signed by Bisi Justice PA  10/09/17 14

:48: 








Addendum


Addendum: 





10/09/17 14:43


called patient and spoke with he patient in depth regarding urine cultures. i 

advised patient of severe urinary infection and need for immediate return to 

the ER for IV antibiotics. I have advised the patient that this infection can 

NOT be treated with antibiotics by mouth. I have advised the patient without 

antibiotics this infection in the urine can lead to death. pt states he is 

aware of the severity of his infection and that he knows that he needs 

antibiotics and that without treatment this can lead to death. Pt states 

doctors at another hospital told this this as well. Pt states he has to file a 

report with the court on friday and that if he can finish everything prior he 

will come to the ER.





Original Note:








Arrival/HPI





- General


Chief Complaint: Male Genitourinary


Time Seen by Provider: 10/07/17 16:22


Historian: Patient





- History of Present Illness


Narrative History of Present Illness (Text): 





10/07/17 18:44


65 yo M presents to the emergency room requesting for to change his Antuenz bag. 

Patient states that he has had the Antunez with bag present since July and since 

then has not had a change or follow-up with a urologist. Patient states it was 

initially placed in [place] when he was hospitalized for hernia repair and 

developed urinary retention during that admission. He states that he then was 

treated at [facility] in July due to complication from the hernia 

surgery, he reports that was the last time the Antunez was changed. Denies fever, 

chills, abdominal pain, nausea, vomiting, back pain. Reports no other 

complaints. Patient states that he has not tried to urinate without the antunez 

and prior to that when he didn't have the antunez, he would urinate very little 

with "drips" and it was painful.





PMD Rick








Past Medical History





- Provider Review


Nursing Documentation Reviewed: Yes





- Reproductive


Currently Pregnant: No





- Cardiac


Hx Cardiac Disorders: Yes


Hx MI: Yes (As per patient)


Hx Hypertension: Yes





- Pulmonary


Hx Respiratory Disorders: No





- Neurological


Hx Neurological Disorder: No





- HEENT


Hx HEENT Disorder: No





- Renal


Hx Renal Disorder: Yes





- Endocrine/Metabolic


Hx Endocrine Disorders: Yes


Hx Diabetes Mellitus Type 2: Yes





- Hematological/Oncological


Hx Blood Disorders: No


Hx AIDS: No





- Integumentary


Hx Dermatological Disorder: No





- Musculoskeletal/Rheumatological


Hx Musculoskeletal Disorders: No


Hx Falls: No





- Gastrointestinal


Hx Constipation: Yes





- Genitourinary/Gynecological


Hx Genitourinary Disorders: Yes





- Psychiatric


Hx Psychophysiologic Disorder: No


Hx Substance Use: No





- Surgical History


Other/Comment: post inguinal  hernia





- Anesthesia


Hx Anesthesia: Yes


Hx Anesthesia Reactions: No


Hx Malignant Hyperthermia: No





Family/Social History





- Physician Review


Nursing Documentation Reviewed: Yes


Family/Social History: No Known Family HX


Smoking Status: Never Smoked


Hx Alcohol Use: No


Hx Substance Use: No





Allergies/Home Meds


Allergies/Adverse Reactions: 


Allergies





No Known Allergies Allergy (Verified 08/08/17 11:29)


 








Home Medications: 


 Home Meds











 Medication  Instructions  Recorded  Confirmed


 


Enalapril Maleate [Enalapril] 5 mg PO DAILY 08/19/16 08/06/17


 


Metoprolol Tartrate [Lopressor] 25 mg PO DAILY 08/19/16 08/06/17


 


Rosuvastatin Calcium [Crestor] 20 mg PO DAILY 08/19/16 08/06/17


 


Aspirin [Routt Aspirin] 81 mg PO DAILY 07/19/17 08/06/17














Review of Systems





- Review of Systems


Constitutional: Normal.  absent: Fatigue, Weight Change, Fevers


Respiratory: Normal.  absent: SOB, Cough, Sputum


Cardiovascular: Normal.  absent: Chest Pain, Palpitations, Edema


Gastrointestinal: Normal.  absent: Abdominal Pain, Diarrhea, Nausea, Vomiting


Genitourinary Male: Normal, Urinary Output Changes.  absent: Dysuria, Frequency

, Hematuria


Musculoskeletal: Normal.  absent: Arthralgias, Back Pain, Neck Pain


Skin: Normal.  absent: Rash, Pruritis, Skin Lesions





Physical Exam


Vital Signs Reviewed: Yes


Vital Signs











  Temp Pulse Resp BP Pulse Ox


 


 10/07/17 18:14   54 L  16  147/91 H  99


 


 10/07/17 18:02   53 L  18  165/78 H  100


 


 10/07/17 15:57  99.1 F  60  18  161/71 H  98











Appearance: Positive for: Well-Appearing, Non-Toxic, Comfortable


Pain Distress: None


Mental Status: Positive for: Alert and Oriented X 3





- Systems Exam


Head: Present: Atraumatic, Normocephalic


Mouth: Present: Moist Mucous Membranes


Neck: Present: Normal Range of Motion


Respiratory/Chest: Present: Clear to Auscultation, Good Air Exchange.  No: 

Respiratory Distress, Accessory Muscle Use


Cardiovascular: Present: Regular Rate and Rhythm, Normal S1, S2.  No: Murmurs


Abdomen: Present: Normal Bowel Sounds.  No: Tenderness, Distention, Rebound, 

Guarding


Genitourinary Male: Present: Normal External Genitalia, Other (+antunez and bag)


Back: Present: Normal Inspection.  No: CVA Tenderness, Midline Tenderness


Upper Extremity: Present: Normal Inspection, Normal ROM, NORMAL PULSES.  No: 

Tenderness


Lower Extremity: Present: Normal Inspection.  No: Edema, Tenderness, Swelling


Neurological: Present: GCS=15, CN II-XII Intact, Speech Normal


Skin: Present: Warm, Normal Color.  No: Dry, Rashes





Medical Decision Making


ED Course and Treatment: 





10/07/17 18:58


65 yo M presents to the emergency room requesting for to change his Antunez bag. 

Patient states that he has had the Antunez with bag present since July and since 

then has not had a change or follow-up with a urologist.





Plan : 


- Antunez change


- UA


- Urine cx











Antunez with bag changed by RN w/o any difficulty. UA results shows +leuks, 

numerous wbc/rbc, +yeast, urine cx sent. Previous urine cx done shows +

klebsiella resistant to numerous abx. UA results d/w the pt, advised that he 

must f/u with urology referral provided in 1-2 days without fail. Will hold off 

on abx at this time pending urine cx. Call placed to Dr. Qureshi. Patient states 

he fully agrees with and understands discharge instructions. States that he 

agrees with the plan and disposition. Verbalized and repeated discharge 

instructions and plan. I have given the patient opportunity to ask any 

additional questions. 








- Lab Interpretations


Lab Results: 


 Lab Results





10/07/17 17:30: Urine Color Light yellow, Urine Appearance Cloudy, Urine pH 5.5

, Ur Specific Gravity 1.025, Urine Protein 100 H, Urine Glucose (UA) Negative, 

Urine Ketones Negative, Urine Blood Large H, Urine Nitrate Negative, Urine 

Bilirubin Negative, Urine Urobilinogen 0.2, Ur Leukocyte Esterase Large H, 

Urine RBC Tntc, Urine WBC Tntc, Ur Epithelial Cells None, Amorphous Sediment Few

, Urine Bacteria Many, RBC Casts 0-1, WBC Casts 0-1, Urine Other Uyeast











- PA / NP / Resident Statement


MD/DO has reviewed & agrees with the documentation as recorded.





Disposition/Present on Arrival





- Present on Arrival


Any Indicators Present on Arrival: Yes


History of DVT/PE: No


History of Uncontrolled Diabetes: No


Urinary Catheter: Yes (Indwelling Antunez Catheter)


History of Decub. Ulcer: No


History Surgical Site Infection Following: None





- Disposition


Have Diagnosis and Disposition been Completed?: Yes


Diagnosis: 


 Urinary catheter (Antunez) change required





Disposition: HOME/ ROUTINE


Disposition Time: 18:00


Condition: STABLE


Discharge Instructions (ExitCare):  Antunez Catheter Placement and Care (ED)


Print Language: ENGLISH


Additional Instructions: 


Thank you for letting us take care of you today. You were treated for antunez 

catheter change. The emergency medical care you received today was directed at 

your acute symptoms. Return to the Emergency Department if your symptoms worsen

, do not improve, or if you have any other problems.





Please contact urology referral provided in 2 days for re-evaluation and follow 

up. Bring any paperwork you were given at discharge with you along with any 

medications you are taking to your follow up visit. Our treatment cannot 

replace ongoing medical care by a primary care provider (PCP) outside of the 

emergency department.





Thank you for allowing the [facility] team to be part of your care today.














If you had a urine culture: It will take several days for the results, if any 

change in treatment is needed we will contact you.***





Referrals: 


Eladio Qureshi MD [Staff Provider] - Follow up with primary


Forms:  [facility] (English)

## 2017-11-24 ENCOUNTER — HOSPITAL ENCOUNTER (EMERGENCY)
Dept: HOSPITAL 42 - ED | Age: 67
Discharge: LEFT BEFORE DISCHARGE | End: 2017-11-24
Payer: MEDICAID

## 2017-11-24 VITALS
DIASTOLIC BLOOD PRESSURE: 78 MMHG | SYSTOLIC BLOOD PRESSURE: 125 MMHG | HEART RATE: 84 BPM | TEMPERATURE: 100 F | OXYGEN SATURATION: 97 % | RESPIRATION RATE: 18 BRPM

## 2017-11-24 VITALS — BODY MASS INDEX: 26.6 KG/M2

## 2017-11-24 DIAGNOSIS — E11.9: ICD-10-CM

## 2017-11-24 DIAGNOSIS — I10: ICD-10-CM

## 2017-11-24 DIAGNOSIS — R10.9: Primary | ICD-10-CM

## 2017-11-24 LAB
ALBUMIN/GLOB SERPL: 0.9 {RATIO} (ref 1.1–1.8)
ALP SERPL-CCNC: 72 U/L (ref 38–126)
ALT SERPL-CCNC: 23 U/L (ref 7–56)
APPEARANCE UR: (no result)
APTT BLD: 29.2 SECONDS (ref 25.1–36.5)
AST SERPL-CCNC: 28 U/L (ref 17–59)
BACTERIA #/AREA URNS HPF: (no result) /[HPF]
BASE EXCESS BLDV CALC-SCNC: -2.5 MMOL/L (ref 0–2)
BASOPHILS # BLD AUTO: 0.03 K/MM3 (ref 0–2)
BASOPHILS NFR BLD: 0.2 % (ref 0–3)
BILIRUB SERPL-MCNC: 0.9 MG/DL (ref 0.2–1.3)
BILIRUB UR-MCNC: NEGATIVE MG/DL
BUN SERPL-MCNC: 23 MG/DL (ref 7–21)
CALCIUM SERPL-MCNC: 9 MG/DL (ref 8.4–10.5)
CHLORIDE SERPL-SCNC: 105 MMOL/L (ref 98–107)
CO2 SERPL-SCNC: 23 MMOL/L (ref 21–33)
COLOR UR: YELLOW
EOSINOPHIL # BLD: 0 10*3/UL (ref 0–0.7)
EOSINOPHIL NFR BLD: 0.3 % (ref 1.5–5)
ERYTHROCYTE [DISTWIDTH] IN BLOOD BY AUTOMATED COUNT: 14.5 % (ref 11.5–14.5)
GLOBULIN SER-MCNC: 4.3 GM/DL
GLUCOSE SERPL-MCNC: 113 MG/DL (ref 70–110)
GLUCOSE UR STRIP-MCNC: NEGATIVE MG/DL
GRANULOCYTES # BLD: 9.95 10*3/UL (ref 1.4–6.5)
GRANULOCYTES NFR BLD: 77.5 % (ref 50–68)
HCT VFR BLD CALC: 32.6 % (ref 42–52)
INR PPP: 1.41 (ref 0.93–1.08)
KETONES UR STRIP-MCNC: NEGATIVE MG/DL
LEUKOCYTE ESTERASE UR-ACNC: (no result) LEU/UL
LIPASE SERPL-CCNC: 35 U/L (ref 23–300)
LYMPHOCYTES # BLD: 1.8 10*3/UL (ref 1.2–3.4)
LYMPHOCYTES NFR BLD AUTO: 14.1 % (ref 22–35)
MCH RBC QN AUTO: 24.9 PG (ref 25–35)
MCHC RBC AUTO-ENTMCNC: 32.2 G/DL (ref 31–37)
MCV RBC AUTO: 77.3 FL (ref 80–105)
MONOCYTES # BLD AUTO: 1 10*3/UL (ref 0.1–0.6)
MONOCYTES NFR BLD: 7.9 % (ref 1–6)
PH BLDV: 7.4 [PH] (ref 7.32–7.43)
PH UR STRIP: 6 [PH] (ref 4.7–8)
PLATELET # BLD: 410 10^3/UL (ref 120–450)
PMV BLD AUTO: 8.9 FL (ref 7–11)
POTASSIUM SERPL-SCNC: 4.1 MMOL/L (ref 3.6–5)
PROT SERPL-MCNC: 8 G/DL (ref 5.8–8.3)
PROT UR STRIP-MCNC: 30 MG/DL
RBC # UR STRIP: (no result) /UL
RBC #/AREA URNS HPF: (no result) /HPF (ref 0–2)
SODIUM SERPL-SCNC: 138 MMOL/L (ref 132–148)
SP GR UR STRIP: 1.02 (ref 1–1.03)
UROBILINOGEN UR STRIP-ACNC: 0.2 E.U./DL
WBC # BLD AUTO: 12.9 10^3/UL (ref 4.5–11)
WBC #/AREA URNS HPF: (no result) /HPF (ref 0–6)

## 2017-11-24 PROCEDURE — 87040 BLOOD CULTURE FOR BACTERIA: CPT

## 2017-11-24 PROCEDURE — 85730 THROMBOPLASTIN TIME PARTIAL: CPT

## 2017-11-24 PROCEDURE — 82803 BLOOD GASES ANY COMBINATION: CPT

## 2017-11-24 PROCEDURE — 80053 COMPREHEN METABOLIC PANEL: CPT

## 2017-11-24 PROCEDURE — 87086 URINE CULTURE/COLONY COUNT: CPT

## 2017-11-24 PROCEDURE — 83690 ASSAY OF LIPASE: CPT

## 2017-11-24 PROCEDURE — 85610 PROTHROMBIN TIME: CPT

## 2017-11-24 PROCEDURE — 81001 URINALYSIS AUTO W/SCOPE: CPT

## 2017-11-24 PROCEDURE — 85025 COMPLETE CBC W/AUTO DIFF WBC: CPT

## 2017-11-24 PROCEDURE — 87205 SMEAR GRAM STAIN: CPT

## 2017-11-24 PROCEDURE — 96374 THER/PROPH/DIAG INJ IV PUSH: CPT

## 2017-11-24 PROCEDURE — 99283 EMERGENCY DEPT VISIT LOW MDM: CPT

## 2017-11-24 NOTE — ED PDOC
Arrival/HPI





- General


Chief Complaint: Abdominal Pain


Time Seen by Provider: 11/24/17 11:17


Historian: Patient, Family





- History of Present Illness


Narrative History of Present Illness (Text): 


11/24/17 11:29





A 67 year old male, whose past medical history includes gout, hypertension, and 

diabetes, presents to the emergency department via EMS for abdominal pain, 

which began 10 days ago. The patient reports his pain is located in his 

periumbilical region and radiated to his lower abdomen and groin and is 

constant. He also notes a dry mouth, and decreased appetite changes. The 

patient states he felt weak earlier today and had to lay himself down, denies 

any trauma. He denies chest pain, fever, or any other complaints at this time. 

He notes a bilateral inguinal hernia repair on 05/26/2017. The patient has a 

antunez bag in place. Patient offered translation but refused because he states 

he can speak english. His friend or girlfriend he states says he can speak 

english.





PMD: Allyssa Cabrera


Cardiologist: Dr. Matthew





Time/Duration: > week (x 10 days)


Symptom Onset: Gradual


Symptom Course: Unchanged


Activities at Onset: Rest, Light


Context: Home





Past Medical History





- Provider Review


Nursing Documentation Reviewed: Yes





- Infectious Disease


Hx of Infectious Diseases: None





- Reproductive


Currently Pregnant: No





- Cardiac


Hx Cardiac Disorders: Yes


Hx MI: Yes (As per patient)


Hx Hypertension: Yes





- Pulmonary


Hx Respiratory Disorders: No





- Neurological


Hx Neurological Disorder: No





- HEENT


Hx HEENT Disorder: No





- Renal


Hx Renal Disorder: Yes





- Endocrine/Metabolic


Hx Endocrine Disorders: Yes


Hx Diabetes Mellitus Type 2: Yes





- Hematological/Oncological


Hx Blood Disorders: No


Hx AIDS: No





- Integumentary


Hx Dermatological Disorder: No





- Musculoskeletal/Rheumatological


Hx Musculoskeletal Disorders: No


Hx Falls: No





- Gastrointestinal


Hx Constipation: Yes





- Genitourinary/Gynecological


Hx Genitourinary Disorders: Yes





- Psychiatric


Hx Psychophysiologic Disorder: No


Hx Substance Use: No





- Surgical History


Other/Comment: post inguinal  hernia





- Anesthesia


Hx Anesthesia: Yes


Hx Anesthesia Reactions: No


Hx Malignant Hyperthermia: No





Family/Social History





- Physician Review


Nursing Documentation Reviewed: Yes


Family/Social History: No Known Family HX


Smoking Status: Never Smoked


Hx Alcohol Use: No


Hx Substance Use: No





Allergies/Home Meds


Allergies/Adverse Reactions: 


Allergies





No Known Allergies Allergy (Verified 08/08/17 11:29)


 








Home Medications: 


 Home Meds











 Medication  Instructions  Recorded  Confirmed


 


Enalapril Maleate [Enalapril] 5 mg PO DAILY 08/19/16 11/24/17


 


Metoprolol Tartrate [Lopressor] 25 mg PO DAILY 08/19/16 11/24/17


 


Aspirin [Klemme Aspirin] 81 mg PO DAILY 07/19/17 11/24/17














Review of Systems





- Physician Review


All systems were reviewed & negative as marked: Yes





- Review of Systems


Constitutional: Fatigue, Weight Change.  absent: Fevers


Cardiovascular: absent: Chest Pain


Gastrointestinal: Appetite Changes





Physical Exam


Vital Signs Reviewed: Yes


Vital Signs











  Temp Pulse Resp BP Pulse Ox


 


 11/24/17 10:58  100 F H  84  18  125/78  97


 


 11/24/17 10:49  100 F H  84  18  125/78  97











Temperature: Febrile


Blood Pressure: Normal


Pulse: Regular


Respiratory Rate: Normal


Appearance: Positive for: Well-Appearing, Non-Toxic, Comfortable


Pain Distress: None


Mental Status: Positive for: Alert and Oriented X 3





- Systems Exam


Head: Present: Atraumatic, Normocephalic


Pupils: Present: PERRL


Extroacular Muscles: Present: EOMI


Conjunctiva: Present: Normal


Mouth: Present: Moist Mucous Membranes


Neck: Present: Normal Range of Motion


Respiratory/Chest: Present: Clear to Auscultation, Good Air Exchange.  No: 

Respiratory Distress, Accessory Muscle Use


Cardiovascular: Present: Regular Rate and Rhythm, Normal S1, S2.  No: Murmurs


Abdomen: Present: Tenderness (diffuse andominal tenderness; lower region 

greater than upper. ), Normal Bowel Sounds, Scars (bilateral inguinal hernia 

scars).  No: Distention


Genitourinary Male: Present: Other (Antunez bag in place )


Upper Extremity: Present: Normal Inspection.  No: Cyanosis, Edema


Lower Extremity: Present: Normal Inspection.  No: Edema


Neurological: Present: GCS=15, CN II-XII Intact, Speech Normal


Skin: Present: Warm, Dry, Normal Color.  No: Rashes


Psychiatric: Present: Alert, Oriented x 3, Normal Insight, Normal Concentration





Medical Decision Making


ED Course and Treatment: 


11/24/17 11:35


Impression: A 67 year old male with abdominal pain. 





Differential Diagnosis included but are not limited to:  Abdominal pain rule 

out obstruction vs. colitis





Plan:


-- Abd & Pel CT


-- Labs


-- Pepcid, IV Fluids


-- Urinalysis 


-- Reassess and disposition





Progress Notes:





11/24/17 15:00


Leaving Against Medical Advice (AMA):





This patient is choosing to leave against medical advice.  I have personally 

explained to the patient that choosing to do so may result in permanent bodily 

harm or death.  I have discussed at great length that without further 

evaluation and monitoring there may be unforeseen circumstances and/or 

deterioration causing permanent bodily harm or death as a result of their 

choice.  The patient is alert, oriented, and shows the mental capacity to make 

clear decisions regarding the patients health care at this time. The patient 

continues to wish to leave against medical advice.  


In light of the patients decision to leave AMA, follow-up has been arranged and 

the patient is aware of the importance of following up as instructed.  The 

patient has been advised that they should return to the ED immediately if they 

change their mind at any time, or if their condition begins to change or worsen 

in any way.





The patient refused to sign any documentation prior to leaving, re-evaluation, 

and CT scan results. The patient states that he has been waiting for too long. 








- Lab Interpretations


Lab Results: 








 11/24/17 11:50 





 11/24/17 11:50 





 Lab Results





11/24/17 13:40: Urine Color Yellow, Urine Appearance Turbid, Urine pH 6.0, Ur 

Specific Gravity 1.020, Urine Protein 30 H, Urine Glucose (UA) Negative, Urine 

Ketones Negative, Urine Blood Trace-intact H, Urine Nitrate Positive H, Urine 

Bilirubin Negative, Urine Urobilinogen 0.2, Ur Leukocyte Esterase Moderate H, 

Urine RBC 0 - 2, Urine WBC Tntc, Urine Bacteria Many


11/24/17 11:50: pO2 56 H, VBG pH 7.40, VBG pCO2 35.0 L, VBG HCO3 21.7, VBG 

Total CO2 22.8, VBG O2 Sat (Calc) 91.5 H, VBG Base Excess -2.5 L, VBG Potassium 

3.9, Sodium 137.0, Chloride 107.0, Glucose 108, Lactate 1.4, FiO2 21.0, Venous 

Blood Potassium 3.9


11/24/17 11:50: Sodium 138, Chloride 105, Potassium 4.1, Carbon Dioxide 23, 

Anion Gap 14, BUN 23 H, Creatinine 1.8 H, Est GFR ( Amer) 45, Est GFR (

Non-Af Amer) 38, Random Glucose 113 H, Calcium 9.0, Total Bilirubin 0.9, AST 28

, ALT 23, Alkaline Phosphatase 72, Total Protein 8.0, Albumin 3.7, Globulin 4.3

, Albumin/Globulin Ratio 0.9 L, Lipase 35


11/24/17 11:50: PT 15.6 H, INR 1.41 H, APTT 29.2


11/24/17 11:50: WBC 12.9 H, RBC 4.22, Hgb 10.5 L, Hct 32.6 L, MCV 77.3 L, MCH 

24.9 L, MCHC 32.2, RDW 14.5, Plt Count 410, MPV 8.9, Gran % 77.5 H, Lymph % (

Auto) 14.1 L, Mono % (Auto) 7.9 H, Eos % (Auto) 0.3 L, Baso % (Auto) 0.2, Gran 

# 9.95 H, Lymph # 1.8, Mono # 1.0 H, Eos # 0.0, Baso # 0.03











- Medication Orders


Current Medication Orders: 











Discontinued Medications





Famotidine (Pepcid)  20 mg IVP STAT STA


   Stop: 11/24/17 11:43


   Last Admin: 11/24/17 12:03  Dose: 20 mg





IVP Administration


 Document     11/24/17 12:03  SRE  (Rec: 11/24/17 12:03  SRE  [id number])


     Charges for Administration


      # of IVP Administrations                   1





Sodium Chloride (Sodium Chloride 0.9%)  1,000 mls @ 100 mls/hr IV .Q10H STA


   Stop: 11/24/17 21:41


   Last Admin: 11/24/17 12:02  Dose: 100 mls/hr





eMAR Start Stop


 Document     11/24/17 12:02  SRE  (Rec: 11/24/17 12:03  SRE  [id number])


     Intravenous Solution


      Start Date                                 11/24/17


      Start Time                                 12:03


      End Date                                   11/24/17


      End time                                   12:03


      Total Infusion Time                        0














- Scribe Statement


The provider has reviewed the documentation as recorded by the Jose Aguilar





Provider Scribe Attestation:


All medical record entries made by the Gretcheniblatricia were at my direction and 

personally dictated by me. I have reviewed the chart and agree that the record 

accurately reflects my personal performance of the history, physical exam, 

medical decision making, and the department course for this patient. I have 

also personally directed, reviewed, and agree with the discharge instructions 

and disposition.








Disposition/Present on Arrival





- Present on Arrival


Any Indicators Present on Arrival: Yes


History of DVT/PE: No


History of Uncontrolled Diabetes: No


Urinary Catheter: Yes (Indwelling Antunez Catheter)


History of Decub. Ulcer: No


History Surgical Site Infection Following: None





- Disposition


Have Diagnosis and Disposition been Completed?: No


Diagnosis: 


 Abdominal pain





Disposition: ELOPEMENT - ER ONLY


Disposition Time: 15:00


Patient Plan: Discharge


Condition: FAIR


Forms:  "[employer] (English)

## 2017-11-27 ENCOUNTER — HOSPITAL ENCOUNTER (INPATIENT)
Dept: HOSPITAL 42 - ED | Age: 67
LOS: 2 days | Discharge: LEFT BEFORE DISCHARGE | DRG: 699 | End: 2017-11-29
Attending: HOSPITALIST | Admitting: INTERNAL MEDICINE
Payer: MEDICARE

## 2017-11-27 VITALS — BODY MASS INDEX: 24.2 KG/M2

## 2017-11-27 DIAGNOSIS — N18.2: ICD-10-CM

## 2017-11-27 DIAGNOSIS — Z16.12: ICD-10-CM

## 2017-11-27 DIAGNOSIS — N13.4: ICD-10-CM

## 2017-11-27 DIAGNOSIS — T83.511A: Primary | ICD-10-CM

## 2017-11-27 DIAGNOSIS — I25.10: ICD-10-CM

## 2017-11-27 DIAGNOSIS — M10.9: ICD-10-CM

## 2017-11-27 DIAGNOSIS — Z79.82: ICD-10-CM

## 2017-11-27 DIAGNOSIS — Z79.84: ICD-10-CM

## 2017-11-27 DIAGNOSIS — N39.0: ICD-10-CM

## 2017-11-27 DIAGNOSIS — E78.00: ICD-10-CM

## 2017-11-27 DIAGNOSIS — G47.00: ICD-10-CM
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
## 2017-11-27 DIAGNOSIS — I12.9: ICD-10-CM

## 2017-11-27 DIAGNOSIS — B96.1: ICD-10-CM

## 2017-11-27 DIAGNOSIS — E11.22: ICD-10-CM

## 2017-11-27 DIAGNOSIS — N17.9: ICD-10-CM

## 2017-11-27 DIAGNOSIS — N13.30: ICD-10-CM

## 2017-11-27 LAB
ALBUMIN/GLOB SERPL: 0.8 {RATIO} (ref 1.1–1.8)
ALP SERPL-CCNC: 80 U/L (ref 38–126)
ALT SERPL-CCNC: 28 U/L (ref 7–56)
APPEARANCE UR: (no result)
AST SERPL-CCNC: 40 U/L (ref 17–59)
BACTERIA #/AREA URNS HPF: (no result) /[HPF]
BASE EXCESS BLDV CALC-SCNC: -2.9 MMOL/L (ref 0–2)
BASOPHILS # BLD AUTO: 0.03 K/MM3 (ref 0–2)
BASOPHILS NFR BLD: 0.4 % (ref 0–3)
BILIRUB SERPL-MCNC: 0.5 MG/DL (ref 0.2–1.3)
BILIRUB UR-MCNC: NEGATIVE MG/DL
BUN SERPL-MCNC: 30 MG/DL (ref 7–21)
CALCIUM SERPL-MCNC: 8.7 MG/DL (ref 8.4–10.5)
CHLORIDE SERPL-SCNC: 106 MMOL/L (ref 98–107)
CO2 SERPL-SCNC: 22 MMOL/L (ref 21–33)
COLOR UR: YELLOW
EOSINOPHIL # BLD: 0.2 10*3/UL (ref 0–0.7)
EOSINOPHIL NFR BLD: 2.4 % (ref 1.5–5)
ERYTHROCYTE [DISTWIDTH] IN BLOOD BY AUTOMATED COUNT: 14.8 % (ref 11.5–14.5)
GLOBULIN SER-MCNC: 4.3 GM/DL
GLUCOSE SERPL-MCNC: 100 MG/DL (ref 70–110)
GLUCOSE UR STRIP-MCNC: NEGATIVE MG/DL
GRANULOCYTES # BLD: 4.55 10*3/UL (ref 1.4–6.5)
GRANULOCYTES NFR BLD: 60.2 % (ref 50–68)
HCT VFR BLD CALC: 32.4 % (ref 42–52)
KETONES UR STRIP-MCNC: NEGATIVE MG/DL
LEUKOCYTE ESTERASE UR-ACNC: (no result) LEU/UL
LIPASE SERPL-CCNC: 79 U/L (ref 23–300)
LYMPHOCYTES # BLD: 2.2 10*3/UL (ref 1.2–3.4)
LYMPHOCYTES NFR BLD AUTO: 28.4 % (ref 22–35)
MCH RBC QN AUTO: 24.6 PG (ref 25–35)
MCHC RBC AUTO-ENTMCNC: 31.2 G/DL (ref 31–37)
MCV RBC AUTO: 79 FL (ref 80–105)
MONOCYTES # BLD AUTO: 0.7 10*3/UL (ref 0.1–0.6)
MONOCYTES NFR BLD: 8.6 % (ref 1–6)
PH BLDV: 7.27 [PH] (ref 7.32–7.43)
PH UR STRIP: 6 [PH] (ref 4.7–8)
PLATELET # BLD: 464 10^3/UL (ref 120–450)
PMV BLD AUTO: 9.6 FL (ref 7–11)
POTASSIUM SERPL-SCNC: 4.8 MMOL/L (ref 3.6–5)
PROT SERPL-MCNC: 7.9 G/DL (ref 5.8–8.3)
PROT UR STRIP-MCNC: 30 MG/DL
RBC # UR STRIP: (no result) /UL
SODIUM SERPL-SCNC: 140 MMOL/L (ref 132–148)
SP GR UR STRIP: 1.01 (ref 1–1.03)
UROBILINOGEN UR STRIP-ACNC: 0.2 E.U./DL
WBC # BLD AUTO: 7.6 10^3/UL (ref 4.5–11)
WBC #/AREA URNS HPF: (no result) /HPF (ref 0–6)

## 2017-11-27 NOTE — ED PDOC
Arrival/HPI





- General


Chief Complaint: Abdominal Pain


Time Seen by Provider: 11/27/17 14:59


Historian: Patient





- History of Present Illness


Narrative History of Present Illness (Text): 





11/27/17 21:10


67-year-old male presents today with abdominal pain. Patient states he has been 

having abdominal pain ever since he had surgery in the lower abdomen in May. 

Patient states he received a phone call to return for positive blood cultures. 

Patient states he just hasn't been feeling well at home. Patient denies fevers 

at home. States he has Chin catheter in place. Denies chest pain or shortness 

of breath. Denies dizziness. Patient unable to describe the abdominal pain but 

states it is severe and no medications have helped with the pain.


Time/Duration: > month


Symptom Onset: Gradual


Symptom Course: Worsening


Quality: Unable to Describe


Severity Level: Severe





Past Medical History





- Provider Review


Nursing Documentation Reviewed: Yes





- Travel History


Have you recently traveled outside [place] w/in the past 3 mons?: No





- Infectious Disease


Hx of Infectious Diseases: None





- Tetanus Immunization


Tetanus Immunization: Unknown





- Reproductive


Currently Pregnant: No





- Cardiac


Hx Cardiac Disorders: Yes


Hx MI: Yes (As per patient)


Hx Hypertension: Yes





- Pulmonary


Hx Respiratory Disorders: No





- Neurological


Hx Neurological Disorder: No





- HEENT


Hx HEENT Disorder: No





- Renal


Hx Renal Disorder: Yes





- Endocrine/Metabolic


Hx Endocrine Disorders: Yes


Hx Diabetes Mellitus Type 2: Yes





- Hematological/Oncological


Hx Blood Disorders: No


Hx AIDS: No





- Integumentary


Hx Dermatological Disorder: No





- Musculoskeletal/Rheumatological


Hx Musculoskeletal Disorders: No


Hx Falls: No





- Gastrointestinal


Hx Constipation: Yes





- Genitourinary/Gynecological


Hx Genitourinary Disorders: Yes





- Psychiatric


Hx Psychophysiologic Disorder: No


Hx Substance Use: No





- Surgical History


Other/Comment: post inguinal  hernia





- Anesthesia


Hx Anesthesia: Yes


Hx Anesthesia Reactions: No


Hx Malignant Hyperthermia: No





Family/Social History





- Physician Review


Nursing Documentation Reviewed: Yes


Family/Social History: Unknown Family HX


Smoking Status: Never Smoked


Hx Alcohol Use: No


Hx Substance Use: No





Allergies/Home Meds


Allergies/Adverse Reactions: 


Allergies





No Known Allergies Allergy (Verified 08/08/17 11:29)


 








Home Medications: 


 Home Meds











 Medication  Instructions  Recorded  Confirmed


 


Enalapril Maleate [Enalapril] 5 mg PO DAILY 08/19/16 11/27/17


 


Metoprolol Tartrate [Lopressor] 25 mg PO DAILY 08/19/16 11/27/17


 


Aspirin [Ciales Aspirin] 81 mg PO DAILY 07/19/17 11/27/17














Review of Systems





- Review of Systems


Constitutional: absent: Fatigue, Fevers


Respiratory: absent: SOB, Cough


Cardiovascular: absent: Chest Pain, Palpitations


Gastrointestinal: Abdominal Pain.  absent: Nausea, Vomiting


Genitourinary Male: absent: Urinary Output Changes


Musculoskeletal: absent: Arthralgias, Back Pain, Neck Pain


Skin: absent: Rash, Pruritis


Neurological: absent: Headache, Dizziness





Physical Exam


Vital Signs Reviewed: Yes


Vital Signs











  Temp Pulse Resp BP Pulse Ox


 


 11/27/17 14:34  98.3 F  61  20  135/78  100











Temperature: Afebrile


Blood Pressure: Normal


Pulse: Regular


Respiratory Rate: Normal


Appearance: Positive for: Well-Appearing, Non-Toxic, Comfortable


Pain Distress: None


Mental Status: Positive for: Alert and Oriented X 3





- Systems Exam


Head: Present: Atraumatic


Mouth: Present: Moist Mucous Membranes


Neck: Present: Normal Range of Motion


Respiratory/Chest: Present: Clear to Auscultation, Good Air Exchange.  No: 

Respiratory Distress, Accessory Muscle Use


Cardiovascular: Present: Regular Rate and Rhythm, Normal S1, S2.  No: Murmurs


Abdomen: Present: Tenderness (+  minimal lower abdominal tenderness), Normal 

Bowel Sounds.  No: Distention, Peritoneal Signs, Rebound, Guarding


Genitourinary Male: Present: Normal External Genitalia.  No: Erythema


Back: Present: Normal Inspection


Upper Extremity: Present: Normal ROM


Lower Extremity: Present: Normal ROM


Neurological: Present: GCS=15, Speech Normal


Skin: Present: Warm, Dry


Psychiatric: Present: Alert, Oriented x 3





Medical Decision Making


ED Course and Treatment: 





11/27/17 21:13


67-year-old male presents today after being called back in for positive blood 

cultures





Patient had positive blood cultures 3 days ago which grew Klebsiella





CBC within normal limits


CMP reveal an 30 creatinine 1.8


Lactic acid 1.6








Urinalysis: Positive blood, moderate leukocytes, 








CAT scan of the abdomen and pelvis:FINDINGS:  


   Lower thorax:  The heart is mildly enlarged. There are coronary artery   


 calcifications. There is a hiatal hernia. There is atelectasis and scarring at

   


 the lung bases. There is a small right effusion.  


   ABDOMEN:  


   Liver:  The there is a small granuloma in the liver.  


   Gallbladder and bile ducts:  Gallbladder is almost completely empty. There 

is   


 a small calcified stone. Common duct is unremarkable.  


   Pancreas:  Pancreas is mildly atrophic.  


   Spleen:  unremarkable  


   Adrenals:  There is thickening of both adrenals. There is a small left   


 adrenal nodule.  


   Kidneys and ureters:  Left kidney is unremarkable. There is mild   


 ureterectasis. Mildly dilated left ureter can be traced to the bladder There 

is   


 obstructive uropathy on the right with pelvocaliectasis and ureterectasis.   


 Dilated ureter can be traced to the bladder. There are no renal or ureteral   


 stones.  


   Stomach and bowel:  Stomach is incompletely distended. Rotation is normal.   


 There is no small bowel obstruction.  There is no pericecal inflammation.   


 Appendix is not visualized. Terminal ileum is unremarkable. Colon is partially

   


 distended with contrast, air and stool. There is mild rectal wall thickening.  


   Appendix:  See stomach and bowel  


   PELVIS:  


   Bladder:  Bladder is almost completely empty. There is bladder wall   


 thickening. There is a Chin catheter. There is air in the bladder.  


   Reproductive:  The prostate is enlarged.Seminal vesicles have the expected   


 configuration. There are bilateral hydroceles  


   ABDOMEN and PELVIS:  


   Intraperitoneal space:  There is no free air or free fluid.  


   Bones/joints:  Bony structures are osteopenic. There are degenerative   


 changes. There is multilevel disc disease. There is retrolisthesis of L2 on 

L3.   


 There is minimal anterolisthesis L5 on S1. There is mild disc bulge at L4-L5   


 and L5-S1.  


   Soft tissues:  There is a fat containing left inguinal hernia.  


   Vasculature:  There are calcified phleboliths.  There are vascular   


 calcifications.  


   Lymph nodes:  There are multiple mildly enlarged retroperitoneal 

perivascular   


 nodes.  


  IMPRESSION:  Right obstructive uropathy and mild left ureterectasis most 

likely   


 due to bladder wall thickening, no renal or ureteral stones; enlarged prostate

;   


 probable gallstone, no ductal dilatation; mild rectal wall thickening, no 

bowel   


 obstruction; trace right pleural effusion  


  Additional findings as described above.  





blood and urine cultures pending





pt started on meropenum IV. 


morphine given for pain. 





pt comfortable in er; no cell phone. no distress. ambulating around ER in NO 

distress. 








i discussed all results with patient in depth





case discussed with dr. moore, accepts admission for bacteremia, UTI, abdominal 

pain with urology consult. 








all aspects of this case were discussed the attending of record. 





impression: bactermia, UTI, abdominal pain, Renal insuffiency


admit to med/surg








- Lab Interpretations


Lab Results: 








 11/27/17 15:55 





 11/27/17 15:55 





 Lab Results





11/27/17 18:04: Urine Color Yellow, Urine Appearance Sl cloudy, Urine pH 6.0, 

Ur Specific Gravity 1.015, Urine Protein 30 H, Urine Glucose (UA) Negative, 

Urine Ketones Negative, Urine Blood Small H, Urine Nitrate Negative, Urine 

Bilirubin Negative, Urine Urobilinogen 0.2, Ur Leukocyte Esterase Moderate H, 

Urine RBC 1 - 3, Urine WBC 15 - 20, Ur Epithelial Cells 1 - 3, Urine Bacteria 

Many


11/27/17 15:55: WBC 7.6  D, RBC 4.10, Hgb 10.1 L, Hct 32.4 L, MCV 79.0 L, MCH 

24.6 L, MCHC 31.2, RDW 14.8 H, Plt Count 464 H, MPV 9.6, Gran % 60.2, Lymph % (

Auto) 28.4, Mono % (Auto) 8.6 H, Eos % (Auto) 2.4, Baso % (Auto) 0.4, Gran # 

4.55, Lymph # 2.2, Mono # 0.7 H, Eos # 0.2, Baso # 0.03


11/27/17 15:55: Sodium 140, Chloride 106, Potassium 4.8, Carbon Dioxide 22, 

Anion Gap 16, BUN 30 H, Creatinine 1.8 H, Est GFR ( Amer) 45, Est GFR (

Non-Af Amer) 38, Random Glucose 100, Calcium 8.7, Total Bilirubin 0.5, AST 40, 

ALT 28, Alkaline Phosphatase 80, Total Protein 7.9, Albumin 3.6, Globulin 4.3, 

Albumin/Globulin Ratio 0.8 L, Lipase 79


11/27/17 15:55: pO2 32, VBG pH 7.27 L, VBG pCO2 54.0, VBG HCO3 24.8, VBG Total 

CO2 26.5, VBG O2 Sat (Calc) 55.0, VBG Base Excess -2.9 L, VBG Potassium 5.2, 

Sodium 138.0, Chloride 108.0 H, Glucose 101, Lactate 1.6, FiO2 21.0, Venous 

Blood Potassium 5.2











- RAD Interpretation


Radiology Orders: 








11/27/17 16:43


ABD & PELVIS PO CONTRAST ONLY [CT] Stat 














- Medication Orders


Current Medication Orders: 








Aspirin (Aspirin Chewable)  81 mg PO DAILY [facility]


Sodium Chloride (Sodium Chloride 0.9%)  1,000 mls @ 100 mls/hr IV .Q10H [facility]


Lisinopril (Zestril)  5 mg PO DAILY [facility]


Meclizine HCl (Antivert)  25 mg PO Q8 [facility]


Metoprolol Tartrate (Lopressor)  25 mg PO DAILY [facility]


Morphine Sulfate (Morphine)  2 mg IVP Q6 PRN


   PRN Reason: Pain, severe (8-10)


Ondansetron HCl (Zofran Inj)  2 mg IVP Q6 PRN


   PRN Reason: Nausea/Vomiting


Pantoprazole Sodium (Protonix Inj)  40 mg IVP DAILY [facility]





Discontinued Medications





Meropenem 1 gm/ Dextrose  100 mls @ 100 mls/hr IVPB STAT STA


   PRN Reason: Protocol


   Stop: 11/27/17 16:40


   Last Admin: 11/27/17 16:24  Dose: 100 mls/hr





eMAR Start Stop


 Document     11/27/17 16:24  HI  (Rec: 11/27/17 16:24  [id number])


     Intravenous Solution


      Start Date                                 11/27/17


      Start Time                                 16:24





Sodium Chloride (Sodium Chloride 0.9%)  1,000 mls @ 999 mls/hr IV .Q1H1M STA


   Stop: 11/27/17 20:15


   Last Admin: 11/27/17 19:25  Dose: 999 mls/hr





eMAR Start Stop


 Document     11/27/17 19:25  HI  (Rec: 11/27/17 19:25  [id number])


     Intravenous Solution


      Start Date                                 11/27/17


      Start Time                                 19:25





Morphine Sulfate (Morphine)  4 mg IVP STAT STA


   Stop: 11/27/17 19:05


   Last Admin: 11/27/17 19:24  Dose: 4 mg





MAR Pain Assessment


 Document     11/27/17 19:24  HI  (Rec: 11/27/17 19:25  [id number])


     Pain Reassessment


      Is this a pain reassessment?               No


     Sleep


      Is patient sleeping during reassessment?   No


     Presence of Pain


      Presence of Pain                           Yes


     Pain Scale Used


      Pain Scale Used                            Numeric


     Location


      Pain Location Body Site                    Abdomen


IVP Administration


 Document     11/27/17 19:24  HI  (Rec: 11/27/17 19:25  [id number])


     Charges for Administration


      # of IVP Administrations                   2


Re-Assess: MAR Pain Assessment


 Document     11/27/17 20:24  HI  (Rec: 11/27/17 20:30  HI  [id number][place]


     Pain Reassessment


      Is this a pain reassessment?               Yes


     Sleep


      Is patient sleeping during reassessment?   No


     Presence of Pain


      Presence of Pain                           Yes


     Location


      Pain Location Body Site                    Abdomen














Disposition/Present on Arrival





- Present on Arrival


Any Indicators Present on Arrival: Yes


History of DVT/PE: No


History of Uncontrolled Diabetes: No


Urinary Catheter: Yes (Indwelling Chin Catheter)


History of Decub. Ulcer: No


History Surgical Site Infection Following: None





- Disposition


Have Diagnosis and Disposition been Completed?: Yes


Diagnosis: 


 UTI (urinary tract infection), Bacteremia, Abdominal pain





Disposition: HOSPITALIZED


Disposition Time: 19:18


Patient Plan: Admission


Condition: FAIR

## 2017-11-27 NOTE — CP.PCM.HP
History of Present Illness





- History of Present Illness


History of Present Illness: 





65 y/o male with a PMHx that includes HTN, NIDDM2, CAD, an indwelling Chin 

insertion s/p Right Inguinal Hernia Repair, gout, and insomnia presented to the 

emergency department complaining of abdominal pain that began a few days ago. 

He describes the pain as sharp in nature with located in the right lower and 

left lower quadrants. In conjunction he also reports subjective fevers and 

nausea that began around the same time.  He denies any alleviating or modifying 

factors.  The patient recently came into the hospital were he was found to have 

Klebsiella growth on urine culture.  The patient subsequently left AMA without 

having treatment. Denies  headaches, changes in vision, vomiting, diarrhea, 

constipation, urinary symptoms, numbness/tingling, calf pain.





PMD: Dr. Schafer


Cardiologist: Dr. Muñoz





PMHx: as per HPI


Meds: See MAR


PsurgHx: R inguinal hernia repair with unknown complication required indwelling 

catheter


ALL: NKDA


Social: Denies ETOH, tobacco, drug abuse. Retired. Lives at home alone.


FamilyHx: noncontributory








Present on Admission





- Present on Admission


Any Indicators Present on Admission: No





Review of Systems





- Constitutional


Constitutional: As Per HPI





- EENT


Eyes: As Per HPI


Ears: As Per HPI


Nose/Mouth/Throat: As Per HPI





- Cardiovascular


Cardiovascular: As Per HPI





- Respiratory


Respiratory: As Per HPI





- Gastrointestinal


Gastrointestinal: As Per HPI





- Genitourinary


Genitourinary: As Per HPI





- Musculoskeletal


Musculoskeletal: As Per HPI





- Integumentary


Integumentary: As Per HPI





- Neurological


Neurological: As Per HPI





- Psychiatric


Psychiatric: As Per HPI





- Endocrine


Endocrine: As Per HPI





Past Patient History





- Infectious Disease


Hx of Infectious Diseases: None





- Past Medical History & Family History


Past Medical History?: Yes





- Past Social History


Smoking Status: Never Smoked





- CARDIAC


Hx Cardiac Disorders: Yes


Hx Heart Attack: Yes (As per patient)


Hx Hypertension: Yes





- PULMONARY


Hx Respiratory Disorders: No





- NEUROLOGICAL


Hx Neurological Disorder: No





- HEENT


Hx HEENT Problems: No





- RENAL


Hx Chronic Kidney Disease: Yes





- ENDOCRINE/METABOLIC


Hx Endocrine Disorders: Yes


Hx Diabetes Mellitus Type 2: Yes





- HEMATOLOGICAL/ONCOLOGICAL


Hx Blood Disorders: No


Hx AIDS: No





- INTEGUMENTARY


Hx Dermatological Problems: No





- MUSCULOSKELETAL/RHEUMATOLOGICAL


Hx Musculoskeletal Disorders: No


Hx Falls: No





- GASTROINTESTINAL


Hx Constipation: Yes





- GENITOURINARY/GYNECOLOGICAL


Hx Genitourinary Disorders: Yes





- PSYCHIATRIC


Hx Psychophysiologic Disorder: No


Hx Substance Use: No





- SURGICAL HISTORY


Other/Comment: post inguinal  hernia





- ANESTHESIA


Hx Anesthesia: Yes


Hx Anesthesia Reactions: No


Hx Malignant Hyperthermia: No





Meds


Allergies/Adverse Reactions: 


 Allergies











Allergy/AdvReac Type Severity Reaction Status Date / Time


 


No Known Allergies Allergy   Verified 08/08/17 11:29














Physical Exam





- Head Exam


Head Exam: ATRAUMATIC, NORMAL INSPECTION, NORMOCEPHALIC





- Eye Exam


Eye Exam: EOMI, Normal appearance, PERRL.  absent: Periorbital tenderness


Pupil Exam: NORMAL ACCOMODATION, PERRL.  absent: Irregular, Unequal





- ENT Exam


ENT Exam: Mucous Membranes Moist, Normal Exam, Normal Oropharynx





- Neck Exam


Neck exam: Positive for: Normal Inspection.  Negative for: Lymphadenopathy, 

Thyromegaly





- Respiratory Exam


Respiratory Exam: Clear to Auscultation Bilateral, NORMAL BREATHING PATTERN.  

absent: Chest Wall Tenderness, Prolonged Expiratory Phase, Wheezes, Respiratory 

Distress





- Cardiovascular Exam


Cardiovascular Exam: REGULAR RHYTHM, +S1, +S2.  absent: Gallop, Rubs





- GI/Abdominal Exam


GI & Abdominal Exam: Normal Bowel Sounds, Soft





- Back Exam


Back exam: NORMAL INSPECTION.  absent: CVA tenderness (L), CVA tenderness (R), 

paraspinal tenderness





- Neurological Exam


Neurological exam: Alert, CN II-XII Intact, Oriented x3





- Psychiatric Exam


Psychiatric exam: Normal Affect, Normal Mood





- Skin


Skin Exam: Dry, Intact, Normal Color





Results





- Vital Signs


Recent Vital Signs: 





 Last Vital Signs











Temp  98.3 F   11/27/17 14:34


 


Pulse  61   11/27/17 14:34


 


Resp  20   11/27/17 14:34


 


BP  135/78   11/27/17 14:34


 


Pulse Ox  100   11/27/17 14:34














- Labs


Result Diagrams: 


 11/27/17 15:55





 11/27/17 15:55





Assessment & Plan





- Assessment and Plan (Free Text)


Assessment: 





65 y/o male with a PMHx that includes HTN, NIDDM2, CAD, an indwelling Chin 

insertion s/p Right Inguinal Hernia Repair, gout, and insomnia admitted for 

bacteremia


Plan: 





1. Bacteremia


-Abdomen pelvis ct: showed right obstructive uropathy and mild left 

ureterectasis most likely due to bladder wall thickening, no renal or ureteral 

stones, probable gallstone, no ductal dilatation, trace right pleural effusion.


-Previous blood culture on last admission grew Klebsiella sensitive to 

Merropenem.


-Patient given 1 dose of Merropenem.


-ID consulted. Will f/u with rec's.





2. CITLALI


-Creatinine 1.8 today. 


-NS @100mls/hr started.


-Nephrology consulted. Will f/u with rec's.





3.h/o of right obstructive uropathy


-Urology consulted. Will f/u with rec's.





4.D.M.


-home meds held.


-ISS. Accuchecks





5.Hypertension


-continue home medications





GI PPX


-protonix


DVT ppx


-SCD's

## 2017-11-27 NOTE — CT
EXAM:

  CT Abdomen and Pelvis Without Intravenous Contrast



EXAM DATE/TIME:

  11/27/2017 4:43 PM



CLINICAL HISTORY:

  67 years old, male; Pain; Abdominal pain; Acute



TECHNIQUE:

  Axial computed tomography images of the abdomen and pelvis without 

intravenous contrast.  All CT scans at this facility use one or more dose 

reduction techniques, viz.: automated exposure control; ma/kV adjustment per 

patient size (including targeted exams where dose is matched to indication; 

i.e. head); or iterative reconstruction technique.

  Coronal and sagittal reformatted images were created and reviewed.



COMPARISON:

  There are no prior studies for comparison.



FINDINGS:

  Lower thorax:  The heart is mildly enlarged. There are coronary artery 

calcifications. There is a hiatal hernia. There is atelectasis and scarring at 

the lung bases. There is a small right effusion.



 ABDOMEN:

  Liver:  The there is a small granuloma in the liver.

  Gallbladder and bile ducts:  Gallbladder is almost completely empty. There is 

a small calcified stone. Common duct is unremarkable.

  Pancreas:  Pancreas is mildly atrophic.

  Spleen:  unremarkable

  Adrenals:  There is thickening of both adrenals. There is a small left 

adrenal nodule.

  Kidneys and ureters:  Left kidney is unremarkable. There is mild 

ureterectasis. Mildly dilated left ureter can be traced to the bladder There is 

obstructive uropathy on the right with pelvocaliectasis and ureterectasis. 

Dilated ureter can be traced to the bladder. There are no renal or ureteral 

stones.

  Stomach and bowel:  Stomach is incompletely distended. Rotation is normal. 

There is no small bowel obstruction.  There is no pericecal inflammation. 

Appendix is not visualized. Terminal ileum is unremarkable. Colon is partially 

distended with contrast, air and stool. There is mild rectal wall thickening.

  Appendix:  See stomach and bowel



 PELVIS:

  Bladder:  Bladder is almost completely empty. There is bladder wall 

thickening. There is a Chin catheter. There is air in the bladder.

  Reproductive:  The prostate is enlarged.Seminal vesicles have the expected 

configuration. There are bilateral hydroceles



 ABDOMEN and PELVIS:

  Intraperitoneal space:  There is no free air or free fluid.

  Bones/joints:  Bony structures are osteopenic. There are degenerative 

changes. There is multilevel disc disease. There is retrolisthesis of L2 on L3. 

There is minimal anterolisthesis L5 on S1. There is mild disc bulge at L4-L5 

and L5-S1.

  Soft tissues:  There is a fat containing left inguinal hernia.

  Vasculature:  There are calcified phleboliths.  There are vascular 

calcifications.

  Lymph nodes:  There are multiple mildly enlarged retroperitoneal perivascular 

nodes.



IMPRESSION:  Right obstructive uropathy and mild left ureterectasis most likely 

due to bladder wall thickening, no renal or ureteral stones; enlarged prostate; 

probable gallstone, no ductal dilatation; mild rectal wall thickening, no bowel 

obstruction; trace right pleural effusion



Additional findings as described above.

## 2017-11-28 VITALS — RESPIRATION RATE: 18 BRPM

## 2017-11-28 LAB
ALBUMIN/GLOB SERPL: 0.8 {RATIO} (ref 1.1–1.8)
ALP SERPL-CCNC: 68 U/L (ref 38–126)
ALT SERPL-CCNC: 26 U/L (ref 7–56)
AST SERPL-CCNC: 40 U/L (ref 17–59)
BASOPHILS # BLD AUTO: 0.03 K/MM3 (ref 0–2)
BASOPHILS NFR BLD: 0.4 % (ref 0–3)
BILIRUB SERPL-MCNC: 0.4 MG/DL (ref 0.2–1.3)
BUN SERPL-MCNC: 28 MG/DL (ref 7–21)
CALCIUM SERPL-MCNC: 8.8 MG/DL (ref 8.4–10.5)
CHLORIDE SERPL-SCNC: 106 MMOL/L (ref 98–107)
CO2 SERPL-SCNC: 24 MMOL/L (ref 21–33)
EOSINOPHIL # BLD: 0.1 10*3/UL (ref 0–0.7)
EOSINOPHIL NFR BLD: 0.7 % (ref 1.5–5)
ERYTHROCYTE [DISTWIDTH] IN BLOOD BY AUTOMATED COUNT: 14.5 % (ref 11.5–14.5)
GLOBULIN SER-MCNC: 4 GM/DL
GLUCOSE SERPL-MCNC: 112 MG/DL (ref 70–110)
GRANULOCYTES # BLD: 5.1 10*3/UL (ref 1.4–6.5)
GRANULOCYTES NFR BLD: 67.8 % (ref 50–68)
HCT VFR BLD CALC: 29.6 % (ref 42–52)
LYMPHOCYTES # BLD: 1.8 10*3/UL (ref 1.2–3.4)
LYMPHOCYTES NFR BLD AUTO: 23.8 % (ref 22–35)
MCH RBC QN AUTO: 24.3 PG (ref 25–35)
MCHC RBC AUTO-ENTMCNC: 31.1 G/DL (ref 31–37)
MCV RBC AUTO: 78.3 FL (ref 80–105)
MONOCYTES # BLD AUTO: 0.6 10*3/UL (ref 0.1–0.6)
MONOCYTES NFR BLD: 7.3 % (ref 1–6)
PLATELET # BLD: 404 10^3/UL (ref 120–450)
PMV BLD AUTO: 9.4 FL (ref 7–11)
POTASSIUM SERPL-SCNC: 4.6 MMOL/L (ref 3.6–5)
PROT SERPL-MCNC: 7.3 G/DL (ref 5.8–8.3)
SODIUM SERPL-SCNC: 139 MMOL/L (ref 132–148)
WBC # BLD AUTO: 7.5 10^3/UL (ref 4.5–11)

## 2017-11-28 RX ADMIN — MEROPENEM SCH MLS/HR: 1 INJECTION INTRAVENOUS at 21:32

## 2017-11-28 RX ADMIN — MEROPENEM SCH MLS/HR: 1 INJECTION INTRAVENOUS at 10:19

## 2017-11-28 NOTE — RAD
HISTORY:

right obstructive uropathy  



COMPARISON:

No prior.



FINDINGS:



BOWEL:

Normal. No obstruction. No free air. 



BONES:

Normal.



OTHER FINDINGS:

There is some residual contrast in the colon.



IMPRESSION:

No visible stone

## 2017-11-28 NOTE — CP.PCM.PN
<Dante Pérez - Last Filed: 11/28/17 14:53>





Subjective





- Date & Time of Evaluation


Date of Evaluation: 11/28/17


Time of Evaluation: 09:44





- Subjective


Subjective: 





Patient seen and examined at bedside. Per nursing no acute events occurred 

overnight. The patient reports an improvement in his abdominal pain from 

yesterday.  The patient denies any fevers, chills, nausea, vomiting, dizziness, 

syncopal episodes, chest pain, headaches, sore throat, numbness or tingling in 

any extremities or any other complaints.





Objective





- Vital Signs/Intake and Output


Vital Signs (last 24 hours): 


 











Temp Pulse Resp BP Pulse Ox


 


 98.8 F   73   20   164/90 H  97 


 


 11/28/17 08:25  11/28/17 10:19  11/28/17 08:25  11/28/17 10:19  11/28/17 08:25








Intake and Output: 


 











 11/28/17 11/28/17





 06:59 18:59


 


Intake Total 120 


 


Output Total 400 


 


Balance -280 














- Medications


Medications: 


 Current Medications





Aspirin (Aspirin Chewable)  81 mg PO DAILY [facility]


   Last Admin: 11/28/17 10:19 Dose:  81 mg


Hydromorphone HCl (Dilaudid)  1 mg IVP Q4H PRN


   PRN Reason: Pain, severe (8-10)


   Last Admin: 11/28/17 05:40 Dose:  1 mg


Meropenem 1 gm/ Dextrose  100 mls @ 100 mls/hr IVPB Q12 ADAL


   PRN Reason: Protocol


   Stop: 12/05/17 10:01


   Last Admin: 11/28/17 10:19 Dose:  100 mls/hr


Sodium Chloride (Sodium Chloride 0.9%)  1,000 mls @ 75 mls/hr IV .[id number] [facility]


   Last Admin: 11/28/17 13:42 Dose:  75 mls/hr


Lisinopril (Zestril)  5 mg PO DAILY [facility]


   Last Admin: 11/28/17 10:17 Dose:  5 mg


Meclizine HCl (Antivert)  25 mg PO Q8 [facility]


   Last Admin: 11/28/17 14:04 Dose:  25 mg


Metoprolol Tartrate (Lopressor)  25 mg PO DAILY [facility]


   Last Admin: 11/28/17 10:19 Dose:  25 mg


Ondansetron HCl (Zofran Inj)  2 mg IVP Q6 PRN


   PRN Reason: Nausea/Vomiting


   Last Admin: 11/28/17 05:49 Dose:  2 mg


Oxycodone/Acetaminophen (Percocet 5/325 Mg Tab)  1 tab PO Q4H PRN


   PRN Reason: Pain, Mild (1-3)


   Stop: 11/30/17 21:02


Pantoprazole Sodium (Protonix Ec Tab)  40 mg PO DAILY [facility]











- Labs


Labs: 


 





 11/28/17 05:20 





 11/28/17 05:20 











- Head Exam


Head Exam: ATRAUMATIC, NORMAL INSPECTION, NORMOCEPHALIC





- Eye Exam


Eye Exam: EOMI, Normal appearance, PERRL.  absent: Periorbital tenderness


Pupil Exam: NORMAL ACCOMODATION, PERRL.  absent: Irregular, Unequal





- ENT Exam


ENT Exam: Mucous Membranes Moist, Normal Exam, Normal Oropharynx





- Neck Exam


Neck Exam: Normal Inspection.  absent: Lymphadenopathy, Thyromegaly





- Respiratory Exam


Respiratory Exam: Clear to Ausculation Bilateral, NORMAL BREATHING PATTERN.  

absent: Chest Wall Tenderness, Prolonged Expiratory Phase, Respiratory Distress





- Cardiovascular Exam


Cardiovascular Exam: REGULAR RHYTHM, +S1, +S2





- GI/Abdominal Exam


GI & Abdominal Exam: Soft, Normal Bowel Sounds.  absent: Hyperactive Bowel 

Sounds





- Extremities Exam


Extremities Exam: Normal Inspection.  absent: Pedal Edema





- Neurological Exam


Neurological Exam: Alert, Awake, Normal Gait, Oriented x3





- Psychiatric Exam


Psychiatric exam: Normal Affect, Normal Mood





- Skin


Skin Exam: Dry, Intact, Normal Color





Assessment and Plan





- Assessment and Plan (Free Text)


Assessment: 





67 y/o male with a PMHx that includes HTN, NIDDM2, CAD, an indwelling Antunez 

insertion s/p Right Inguinal Hernia Repair, gout, and insomnia admitted for 

bacteremia


Plan: 





1. Bacteremia


-Abdomen pelvis ct: showed right obstructive uropathy and mild left 

ureterectasis most likely due to bladder wall thickening, no renal or ureteral 

stones, probable gallstone, no ductal dilatation, trace right pleural effusion.


-Previous blood culture on last admission grew Klebsiella sensitive to 

Merropenem.


- Continue Meropenem


-ID consulted. Will f/u with rec's.





2. Dizziness


-Orthostatics ordered . Will f/u with results.`





3. CITLALI


-Creatinine 1.9 today. 


-Continue NS @100mls/hr.


-Nephrology consulted. Will f/u with rec's.





4.h/o of right obstructive uropathy


-Urology consulted. Will f/u with rec's.


-KUB ordered. Will f/u with results.


-Urine filtered.





5.D.M.


-home meds held.


-ISS. Accuchecks





6.Hypertension


-continue home medications





GI PPX


-protonix


DVT ppx


-SCD's








<Mj Rodas - Last Filed: 11/29/17 15:45>





Objective





- Vital Signs/Intake and Output


Vital Signs (last 24 hours): 


 











Temp Pulse Resp BP Pulse Ox


 


 98.3 F   58 L  18   135/78   96 


 


 11/29/17 08:14  11/29/17 10:44  11/29/17 08:14  11/29/17 10:44  11/29/17 08:14








Intake and Output: 


 











 11/29/17 11/29/17





 06:59 18:59


 


Intake Total 1180 


 


Balance 1180 














- Labs


Labs: 


 





 11/29/17 06:00 





 11/29/17 06:00 











Attending/Attestation





- Attestation


I have personally seen and examined this patient.: Yes


I have fully participated in the care of the patient.: Yes


I have reviewed all pertinent clinical information, including history, physical 

exam and plan: Yes


Notes (Text): 


I have seen and examined the patient at bedside. Agree with the additions/ 

exceptions: Briefly this is 66 year old male with history of HTN, DM-2, CAD, 

indwelling antunez catheter, s/p right sided inguinal hernia, gout and insomnia 

who was asked to come to the hospital for positive blood and urine culture (

ESBL klebsiella bacteremia and UTI). Abdominal pelvis CT was reviewed which 

showed right obstructive uropathy and mild left ureterectasis. Currently 

patient feels well and denies any complaints. Continue meropenem as per ID. 

Nephro and urologist on board. Patient has acute kidney injury. KUB ordered. 

Upon discharge patient will follow up with Dr Heather Matthew.


Dr Mj Rodas

## 2017-11-28 NOTE — CON
DATE:  11/28/2017



HISTORY OF PRESENT ILLNESS:  The patient admitted for Dr. Jaimes.



FAMILY PHYSICIAN:  Dr. Matthew.



REFERRING MD:  Dr. Jaimes.



REASON FOR CONSULTATION:  Evaluation of the patient unknown to me who

presents with an elevated BUN and creatinine in the setting of a Klebsiella

UTI and Klebsiella bacteremia.



HISTORY OF PRESENT ILLNESS:  The patient is a 66-year-old white male with a

history of hypertension, history of NIDDM, history of ASHD, status post

left inguinal hernia repair back in the spring of 2017 apparently

complicated with urinary retention.  The patient is an indwelling Chin

catheter six months duration.  The patient was noted to have ESBL

Klebsiella urinary tract infection and blood cultures are positive for

Klebsiella.  The patient was told to come to the emergency room for IV

antibiotic therapy.  The patient has a history of anemia.  History of

chronic kidney disease stage II suspect.  We were asked to evaluate the

patient for his elevated BUN and creatinine.  BUN is 28 with a creatinine

of 1.9.  His baseline BUN is less than 20 with a creatinine in the 1-1.3

range.  The patient is noted on his abdominal CT scan to have right

obstructive uropathy and mild left ureteral dilatation.  A urology

evaluation is pending.  He remains on IV antibiotic therapy which was

started at the time of admission to the hospital.  Past medical history

significant for hypertension, NIDDM, ASHD suspect, history of urinary

retention with indwelling Chin catheter, history of a Klebsiella urinary

tract infection and Klebsiella bacteremia, history of anemia, status post

left inguinal hernia repair at [facility] [place] in 05/2017.



MEDICATIONS:  At home include that of; MiraLax, [place] Aspirin,

Lopressor, Antivert, Motrin, and enalapril.



ALLERGIES:  NO KNOWN ALLERGIES TO MEDICATIONS.



MEDICATIONS:  Presently in hospital include that of Antivert, aspirin,

p.r.n. Dilaudid, Lopressor, meropenem, p.r.n. Percocet, Protonix, normal

saline 100 mL an hour, Toradol p.r.n., lisinopril 5 mg a day, and Zofran

p.r.n.



SOCIAL HISTORY:  No history of cigarette smoking.  No history of alcohol

use.  Family history is noncontributory.



REVIEW OF SYSTEMS:

GENERAL:  Positive loss of appetite and weight loss.  The patient's wife

states his weight is down 60 pounds since the spring.

ENT:  Denies any hearing or visual problems.

PULMONARY:  No shortness of breath.  No COPD.  No bronchitis, emphysema or

asthma.

CARDIAC:  History of coronary artery disease, but no chest pain,

palpitations or shortness of breath.

GASTROINTESTINAL:  Positive for mild nausea, abdominal pain.  Intermittent

diarrhea.  No vomiting.  No constipation.

GENITOURINARY:  Chronic indwelling Chin catheter as noted above.  History

of NIDDM appears to be diet controlled.

MUSCULOSKELETAL:  No issues or complaints.

NEUROLOGIC:  No history of CVA, TIA, seizures or syncope.

HEMATOLOGY/ONCOLOGY:  History of anemia.  No history of malignancy.

PSYCHIATRIC:  Negative.



PHYSICAL EXAMINATION:

GENERAL:  The patient is currently seen lying supine in bed on 3-R.  He

appears to be in no acute distress.  IV fluids and IV antibiotic are

infusing.

VITAL SIGNS:  Blood pressure is 164/90, pulse is 73, temperature 98.8,

respiratory rate 20 with a pulse ox of 97%.

HEENT:  Shows him to be normocephalic, atraumatic.  Conjunctivae are pale. 

Sclerae are nonicteric.  Pupils equal, active light accommodation. 

Extraocular muscles are intact.  Posterior pharynx is normal.

NECK:  Supple.  No neck vein distention.  No thyromegaly.  No

lymphadenopathy.  No bruits.

CHEST:  Clear to auscultation and percussion.  No rales.  No rhonchi or

wheezing.

CARDIOVASCULAR:  Shows a regular rate and rhythm without audible murmurs,

rubs or gallops.

ABDOMEN:  Soft.  Bowel sounds normal.  No rebound.  No guarding.  No

tenderness on palpation of any of four quadrants.

BACK:  No CVAT.  No spinal tenderness.  Positive indwelling Chin catheter

with a right leg urine bag.

EXTREMITIES:  Show no cyanosis, no clubbing or edema.  Distal lower

extremity pulses are 1-2+ bilaterally.

NEUROLOGIC:  Shows him to be alert and oriented x3 with no gross focal

motor or sensory deficits noted.



LABORATORY DATA AND IMAGING STUDIES:  Admitting abdominal CT shows right

obstructive uropathy with mild left ureteral dilatation.  CBC; white blood

cell count 7.5, hemoglobin down to 9.2 with a platelet count of 404,000. 

Chemistry showed normal electrolytes.  BUN 28 with a creatinine of 1.9. 

His baseline BUN is less than 20 with a creatinine of 1-1.3 range.  Glucose

is 112.  Calcium is 8.8.  Liver enzymes are normal.  Albumin is 3.3.  Urine

show 15 to 20 white blood cells per high-power field, trace protein, 1 to 3

red blood cells per high-power field, many bacteria.  Urine creatinine was

80 with a urine random sodium of 63.  Fractional excretion of sodium is

greater than 1%.  Microbiology from the outpatient side; urine cultures are

positive for Klebsiella pneumonia.  Blood cultures are positive for

Klebsiella pneumonia.



ASSESSMENT:

1.  Acute renal failure superimposed on chronic kidney disease stage II

this is in the setting of right-sided obstructive uropathy of mild

left-sided ureteral dilatation in the setting of Klebsiella urinary tract

infection and Klebsiella bacteremia.  The patient apparently has urinary

retention, has a chronic indwelling Chin catheter of 6 months duration. 

The patient is waiting Genitourinary evaluation.  He remains on IV

antibiotic therapy.  He remains on IV hydration.  I suspect that with

appropriate treatment of his urinary tract infection and correction of his

obstructive uropathy, his BUN and creatinine will fall back to baseline

levels.

2.  History of hypertension.  Blood pressure control is borderline.  The

patient may remain on an angiotensin-converting enzyme inhibitor from my

standpoint as long as his BUN and creatinine did not rise.  If necessary a

calcium channel blocker can be started.

3.  History of anemia.  This is likely secondary to mild chronic kidney

disease secondary to bone marrow suppression from his Klebsiella bacteremia

and urinary tract infection.

4.  History of arteriosclerotic heart disease appears to be stable.

5.  Non-insulin dependent diabetes mellitus, diet controlled.  His glucose

levels have been 100 and 112.

6.  Status post left inguinal hernia repair.  The patient and his wife feel

that the patient had a surgical procedure that did not benefit him.  He is

interested in perhaps a second opinion to evaluate the first surgery.



PLAN:

1.  Continue IV fluid hydration.

2.  Continue to monitor accurate Is and Os.

3.  Continue meropenem for treatment of his Klebsiella urinary tract

infection and bacteremia likely require 2 weeks of antibiotics.

4.  Await  evaluation for evaluation of his right obstructive uropathy of

mild left ureteral dilatation.

5.  Continue low-dose ACE inhibition with the possible addition of low-dose

calcium channel blocker therapy if blood pressure control remains

suboptimal.

6.  For his anemia.  We will obtain iron saturations B12 and folate level. 

If necessary supplement and p.r.n. use of Aranesp should his kidney

function remained abnormal.

7.  Daily Is and Os, check phosphorus.  Check magnesium level.

8.  We will continue to monitor closely with you.



Discussed with staff on 3-R.  Thank you for letting me partake and share in

the care of your patient.





__________________________________________

Gallito Tee MD





DD:  11/28/2017 12:33:42

DT:  11/28/2017 12:42:54

Job # [id number]

## 2017-11-28 NOTE — CP.PCM.CON
History of Present Illness





- History of Present Illness


History of Present Illness: 


66 year old male with PMH of HTN, DM, CAD, indwelling Chin catheter since May 

2017, S/P inguinal hernia repair on the left was seen in the ED Nov. 24, 2017 

for abdominal pain for about 2-3 days located in the lower quadrants, mostly on 

the right side. He had no fever or chills, no nausea, no vomiting, no diarrhea 

or suprapubic pain. Work up was done then but the patient decided to sign out 

AMA. He was then found to have gram negative bacilli bacteremia and was told to 

go back to the hospital and he was admitted yesterday. He denies developing any 

fevers, no increase in his abdominal pain, no headache or dizziness, no chest 

pain, no SOB, no cough or colds, no blurring of vision, no sore throat. CT scan 

of the abdomen and pelvis is showing right sided obstructive uropathy and mild 

left ureter dilatation. Infectious diseases consult is requested to further 

evaluate and manage.





Review of Systems





- Review of Systems


All systems: reviewed and no additional remarkable complaints except (as per HPI

)





Past Patient History





- Infectious Disease


Hx of Infectious Diseases: None





- Tetanus Immunizations


Tetanus Immunization: Unknown





- Past Medical History & Family History


Past Medical History?: Yes





- Past Social History


Smoking Status: Never Smoked





- CARDIAC


Hx Cardiac Disorders: Yes (mi, angina, CAD)


Hx Hypercholesterolemia: Yes


Hx Hypertension: Yes





- PULMONARY


Hx Respiratory Disorders: No





- NEUROLOGICAL


Hx Neurological Disorder: No





- HEENT


Hx HEENT Problems: No





- RENAL


Hx Chronic Kidney Disease: No





- ENDOCRINE/METABOLIC


Hx Endocrine Disorders: Yes


Hx Diabetes Mellitus Type 2: Yes





- HEMATOLOGICAL/ONCOLOGICAL


Hx Blood Disorders: No





- INTEGUMENTARY


Hx Dermatological Problems: No





- MUSCULOSKELETAL/RHEUMATOLOGICAL


Hx Falls: No





- GASTROINTESTINAL


Hx Gastrointestinal Disorders: Yes (ventral hernia repair)





- GENITOURINARY/GYNECOLOGICAL


Hx Genitourinary Disorders: Yes


Hx Prostate Problems: Yes


Hx Urinary Tract Infection: Yes





- PSYCHIATRIC


Hx Psychophysiologic Disorder: No





- SURGICAL HISTORY


Hx Surgeries: Yes (hernia repair)





- ANESTHESIA


Hx Anesthesia: Yes


Hx Anesthesia Reactions: No


Hx Malignant Hyperthermia: No





Meds


Allergies/Adverse Reactions: 


 Allergies











Allergy/AdvReac Type Severity Reaction Status Date / Time


 


No Known Allergies Allergy   Verified 08/08/17 11:29














- Medications


Medications: 


 Current Medications





Aspirin (Aspirin Chewable)  81 mg PO DAILY ADAL


Hydromorphone HCl (Dilaudid)  1 mg IVP Q4H PRN


   PRN Reason: Pain, severe (8-10)


   Last Admin: 11/28/17 05:40 Dose:  1 mg


Sodium Chloride (Sodium Chloride 0.9%)  1,000 mls @ 100 mls/hr IV .Q10H [facility]


   Last Admin: 11/28/17 05:46 Dose:  100 mls/hr


Meropenem 1 gm/ Dextrose  100 mls @ 100 mls/hr IVPB Q12 ADAL


   PRN Reason: Protocol


   Stop: 12/05/17 10:01


Ketorolac Tromethamine (Toradol)  30 mg IVP Q6H PRN


   PRN Reason: Pain, moderate (4-7)


Lisinopril (Zestril)  5 mg PO DAILY [facility]


Meclizine HCl (Antivert)  25 mg PO Q8 [facility]


   Last Admin: 11/28/17 05:38 Dose:  25 mg


Metoprolol Tartrate (Lopressor)  25 mg PO DAILY [facility]


Ondansetron HCl (Zofran Inj)  2 mg IVP Q6 PRN


   PRN Reason: Nausea/Vomiting


   Last Admin: 11/28/17 05:49 Dose:  2 mg


Oxycodone/Acetaminophen (Percocet 5/325 Mg Tab)  1 tab PO Q4H PRN


   PRN Reason: Pain, Mild (1-3)


   Stop: 11/30/17 21:02


Pantoprazole Sodium (Protonix Inj)  40 mg IVP DAILY [facility]











Physical Exam





- Constitutional


Appears: Non-toxic, No Acute Distress





- Head Exam


Head Exam: NORMAL INSPECTION





- ENT Exam


ENT Exam: Mucous Membranes Moist





- Neck Exam


Neck exam: Negative for: Lymphadenopathy, Meningismus





- Respiratory Exam


Respiratory Exam: Decreased Breath Sounds.  absent: Rales





- Cardiovascular Exam


Cardiovascular Exam: +S1, +S2





- GI/Abdominal Exam


GI & Abdominal Exam: Soft.  absent: Tenderness


Additional comments: 





Chin catheter in place





Results





- Vital Signs


Recent Vital Signs: 


 Last Vital Signs











Temp  98.3 F   11/27/17 14:34


 


Pulse  73   11/27/17 21:05


 


Resp  20   11/27/17 21:42


 


BP  124/72   11/27/17 21:05


 


Pulse Ox  100   11/27/17 21:05














- Labs


Result Diagrams: 


 11/28/17 05:20





 11/28/17 05:20





Assessment & Plan





- Assessment and Plan (Free Text)


Plan: 





Assessment


Complicated UTI associated with indwelling Chin catheter, with ESBL Klebsiella 

bacteremia and right sided obstructive uropathy


HTN


DM


CAD


indwelling Chin catheter since May 2017


S/P inguinal hernia repair 





Plan


Started patient on Merrem and will get repeat blood and urine cx on this 

admission; reviewed CT A/P


follow up Urology evaluation and recommendations - patient would need at least 

2 weeks of antibiotics


will monitor clinically

## 2017-11-29 VITALS
TEMPERATURE: 98.3 F | DIASTOLIC BLOOD PRESSURE: 78 MMHG | HEART RATE: 58 BPM | SYSTOLIC BLOOD PRESSURE: 135 MMHG | OXYGEN SATURATION: 96 %

## 2017-11-29 LAB
ALBUMIN/GLOB SERPL: 0.9 {RATIO} (ref 1.1–1.8)
ALP SERPL-CCNC: 63 U/L (ref 38–126)
ALT SERPL-CCNC: 25 U/L (ref 7–56)
AST SERPL-CCNC: 33 U/L (ref 17–59)
BASOPHILS # BLD AUTO: 0.06 K/MM3 (ref 0–2)
BASOPHILS NFR BLD: 1 % (ref 0–3)
BILIRUB SERPL-MCNC: 0.3 MG/DL (ref 0.2–1.3)
BUN SERPL-MCNC: 30 MG/DL (ref 7–21)
CALCIUM SERPL-MCNC: 8.5 MG/DL (ref 8.4–10.5)
CHLORIDE SERPL-SCNC: 106 MMOL/L (ref 98–107)
CO2 SERPL-SCNC: 25 MMOL/L (ref 21–33)
EOSINOPHIL # BLD: 0.2 10*3/UL (ref 0–0.7)
EOSINOPHIL NFR BLD: 3.5 % (ref 1.5–5)
ERYTHROCYTE [DISTWIDTH] IN BLOOD BY AUTOMATED COUNT: 14.6 % (ref 11.5–14.5)
FOLATE SERPL-MCNC: 4.9 NG/ML
GLOBULIN SER-MCNC: 3.7 GM/DL
GLUCOSE SERPL-MCNC: 94 MG/DL (ref 70–110)
GRANULOCYTES # BLD: 2.93 10*3/UL (ref 1.4–6.5)
GRANULOCYTES NFR BLD: 50.9 % (ref 50–68)
HCT VFR BLD CALC: 27.7 % (ref 42–52)
IRON SERPL-MCNC: 24 UG/DL (ref 45–180)
LYMPHOCYTES # BLD: 2 10*3/UL (ref 1.2–3.4)
LYMPHOCYTES NFR BLD AUTO: 34 % (ref 22–35)
MAGNESIUM SERPL-MCNC: 1.8 MG/DL (ref 1.7–2.2)
MCH RBC QN AUTO: 24.1 PG (ref 25–35)
MCHC RBC AUTO-ENTMCNC: 30.7 G/DL (ref 31–37)
MCV RBC AUTO: 78.7 FL (ref 80–105)
MONOCYTES # BLD AUTO: 0.6 10*3/UL (ref 0.1–0.6)
MONOCYTES NFR BLD: 10.6 % (ref 1–6)
PHOSPHATE SERPL-MCNC: 5 MG/DL (ref 2.5–4.5)
PLATELET # BLD: 398 10^3/UL (ref 120–450)
PMV BLD AUTO: 9.1 FL (ref 7–11)
POTASSIUM SERPL-SCNC: 4.2 MMOL/L (ref 3.6–5)
PROT SERPL-MCNC: 7 G/DL (ref 5.8–8.3)
SODIUM SERPL-SCNC: 140 MMOL/L (ref 132–148)
WBC # BLD AUTO: 5.8 10^3/UL (ref 4.5–11)

## 2017-11-29 RX ADMIN — MEROPENEM SCH MLS/HR: 1 INJECTION INTRAVENOUS at 10:44

## 2017-11-29 NOTE — CP.PCM.DIS
<ElisaWoon - Last Filed: 11/29/17 15:51>





Provider





- Provider


Date of Admission: 


11/27/17 19:14





Attending physician: 


Mj Rodas MD





Primary care physician: 


Ruslan Beck MD





Time Spent in preparation of Discharge (in minutes): 45





Hospital Course





- Lab Results


Lab Results: 


 Most Recent Lab Values











WBC  5.8 10^3/ul (4.5-11.0)  D 11/29/17  06:00    


 


RBC  3.52 10^6/uL (3.5-6.1)   11/29/17  06:00    


 


Hgb  8.5 g/dL (14.0-18.0)  L  11/29/17  06:00    


 


Hct  27.7 % (42.0-52.0)  L  11/29/17  06:00    


 


MCV  78.7 fl (80.0-105.0)  L  11/29/17  06:00    


 


MCH  24.1 pg (25.0-35.0)  L  11/29/17  06:00    


 


MCHC  30.7 g/dl (31.0-37.0)  L  11/29/17  06:00    


 


RDW  14.6 % (11.5-14.5)  H  11/29/17  06:00    


 


Plt Count  398 10^3/uL (120.0-450.0)   11/29/17  06:00    


 


MPV  9.1 fl (7.0-11.0)   11/29/17  06:00    


 


Gran %  50.9 % (50.0-68.0)   11/29/17  06:00    


 


Lymph % (Auto)  34.0 % (22.0-35.0)   11/29/17  06:00    


 


Mono % (Auto)  10.6 % (1.0-6.0)  H  11/29/17  06:00    


 


Eos % (Auto)  3.5 % (1.5-5.0)   11/29/17  06:00    


 


Baso % (Auto)  1.0 % (0.0-3.0)   11/29/17  06:00    


 


Gran #  2.93  (1.4-6.5)   11/29/17  06:00    


 


Lymph #  2.0  (1.2-3.4)   11/29/17  06:00    


 


Mono #  0.6  (0.1-0.6)   11/29/17  06:00    


 


Eos #  0.2  (0.0-0.7)   11/29/17  06:00    


 


Baso #  0.06 K/mm3 (0.0-2.0)   11/29/17  06:00    


 


pO2  32 mm/Hg (30-55)   11/27/17  15:55    


 


VBG pH  7.27  (7.32-7.43)  L  11/27/17  15:55    


 


VBG pCO2  54.0  (40-60)   11/27/17  15:55    


 


VBG HCO3  24.8 mmol/l (21-28)   11/27/17  15:55    


 


VBG Total CO2  26.5 mmol.L (22-28)   11/27/17  15:55    


 


VBG O2 Sat (Calc)  55.0 % (40-65)   11/27/17  15:55    


 


VBG Base Excess  -2.9 mmol/L (0.0-2.0)  L  11/27/17  15:55    


 


VBG Potassium  5.2 mmol/L (3.6-5.2)   11/27/17  15:55    


 


Sodium  138.0 mmol/L (132-148)   11/27/17  15:55    


 


Chloride  108.0 mmol/L ()  H  11/27/17  15:55    


 


Glucose  101 mg/dl ()   11/27/17  15:55    


 


Lactate  1.6 mmol/L (0.7-2.1)   11/27/17  15:55    


 


FiO2  21.0 %  11/27/17  15:55    


 


Sodium  140 mmol/L (132-148)   11/29/17  06:00    


 


Potassium  4.2 mmol/L (3.6-5.0)   11/29/17  06:00    


 


Chloride  106 mmol/L ()   11/29/17  06:00    


 


Carbon Dioxide  25 mmol/L (21-33)   11/29/17  06:00    


 


Anion Gap  14  (10-20)   11/29/17  06:00    


 


BUN  30 mg/dL (7-21)  H  11/29/17  06:00    


 


Creatinine  2.2 mg/dl (0.8-1.5)  H  11/29/17  06:00    


 


Est GFR ( Amer)  36   11/29/17  06:00    


 


Est GFR (Non-Af Amer)  30   11/29/17  06:00    


 


Random Glucose  94 mg/dL ()   11/29/17  06:00    


 


Calcium  8.5 mg/dL (8.4-10.5)   11/29/17  06:00    


 


Phosphorus  5.0 mg/dL (2.5-4.5)  H  11/29/17  06:00    


 


Magnesium  1.8 mg/dL (1.7-2.2)   11/29/17  06:00    


 


Iron  24 ug/dL ()  L  11/29/17  06:00    


 


TIBC  184 ug/dL (261-462)  L  11/29/17  06:00    


 


% Saturation  13 % (20-55)  L  11/29/17  06:00    


 


Ferritin  149.0 ng/mL  11/29/17  06:00    


 


Total Bilirubin  0.3 mg/dL (0.2-1.3)   11/29/17  06:00    


 


AST  33 U/L (17-59)   11/29/17  06:00    


 


ALT  25 U/L (7-56)   11/29/17  06:00    


 


Alkaline Phosphatase  63 U/L ()   11/29/17  06:00    


 


Total Protein  7.0 g/dL (5.8-8.3)   11/29/17  06:00    


 


Albumin  3.2 g/dL (3.0-4.8)   11/29/17  06:00    


 


Globulin  3.7 gm/dL  11/29/17  06:00    


 


Albumin/Globulin Ratio  0.9  (1.1-1.8)  L  11/29/17  06:00    


 


Lipase  79 U/L ()   11/27/17  15:55    


 


Vitamin B12  434 pg/mL (239-931)   11/29/17  06:00    


 


Folate  4.9 ng/mL  11/29/17  06:00    


 


Venous Blood Potassium  5.2 mmol/L (3.6-5.2)   11/27/17  15:55    


 


Urine Color  Yellow  (YELLOW)   11/27/17  18:04    


 


Urine Appearance  Sl cloudy  (CLEAR)   11/27/17  18:04    


 


Urine pH  6.0  (4.7-8.0)   11/27/17  18:04    


 


Ur Specific Gravity  1.015  (1.005-1.035)   11/27/17  18:04    


 


Urine Protein  30 mg/dL (<30 mg/dL)  H  11/27/17  18:04    


 


Urine Glucose (UA)  Negative mg/dL (NEGATIVE)   11/27/17  18:04    


 


Urine Ketones  Negative mg/dL (NEGATIVE)   11/27/17  18:04    


 


Urine Blood  Small  (NEGATIVE)  H  11/27/17  18:04    


 


Urine Nitrate  Negative  (NEGATIVE)   11/27/17  18:04    


 


Urine Bilirubin  Negative  (NEGATIVE)   11/27/17  18:04    


 


Urine Urobilinogen  0.2 E.U./dL (<1 E.U./dL)   11/27/17  18:04    


 


Ur Leukocyte Esterase  Moderate Sandra/uL (NEGATIVE)  H  11/27/17  18:04    


 


Urine RBC  1 - 3 /hpf (0-2)   11/27/17  18:04    


 


Urine WBC  15 - 20 /hpf (0-6)   11/27/17  18:04    


 


Ur Epithelial Cells  1 - 3 /hpf (0-5)   11/27/17  18:04    


 


Urine Bacteria  Many  (NEG)   11/27/17  18:04    


 


Ur Random Creatinine  80 mg/dL  11/28/17  11:17    


 


Ur Random Sodium  63 meq/L  11/28/17  11:17    


 


HIV 1&2 Antibody Screen  Negative  (NEGATIVE)   11/28/17  13:20    














- Hospital Course


Hospital Course: 





67 y/o male with a PMHx that includes HTN, NIDDM2, CAD, an indwelling Chin 

insertion s/p Right Inguinal Hernia Repair, gout, and insomnia presented to the 

emergency department complaining of abdominal pain that began a few days ago. 

He describes the pain as sharp in nature with located in the right lower and 

left lower quadrants. In conjunction he also reports subjective fevers and 

nausea that began around the same time.  He denies any alleviating or modifying 

factors.  The patient recently came into the hospital were he was found to have 

Klebsiella growth on urine culture.  The patient subsequently left AMA without 

having treatment.  The patient was admitted for bacteremia as a result.  While 

admitted the patient was seen by Nephrology and Infectious disease while 

admitted to the hospital. The patient was found to have an elevated creatinine 

upon admission and started IV fluids as a result. The patient was recommended 

to have to complete IV Meropenem for two weeks.  The patient was seen by 

Nephrology who recommended to continue iv fluid hydration, monitor i's and o's, 

and low dose ACE.  The patient was also seen by Urology who said he could be 

worked up on an outpatient basis.  The patient was scheduled to have an Midline 

placed for antibiotics but refused and wanted a second opinion.  The patient 

refused to sign the AMA form and left without completing treatment. 





Discharge Exam





- Head Exam


Head Exam: NORMAL INSPECTION





- Eye Exam


Eye Exam: EOMI, Normal appearance, PERRL


Pupil Exam: NORMAL ACCOMODATION, PERRL





- ENT Exam


ENT Exam: Mucous Membranes Moist, Normal Oropharynx





- Respiratory Exam


Respiratory Exam: Clear to PA & Lateral, NORMAL BREATHING PATTERN





- Cardiovascular Exam


Cardiovascular Exam: REGULAR RHYTHM, +S1, +S2





- GI/Abdominal Exam


GI & Abdominal Exam: Normal Bowel Sounds, Unremarkable





- Extremities Exam


Extremities exam: full ROM





- Back Exam


Back exam: NORMAL INSPECTION.  absent: CVA tenderness (L), CVA tenderness (R), 

paraspinal tenderness





- Neurological Exam


Neurological exam: Alert, CN II-XII Intact, Oriented x3





- Psychiatric Exam


Psychiatric exam: Normal Affect, Normal Mood





- Skin


Skin Exam: Dry, Intact





Discharge Plan





- Follow Up Plan


Condition: FAIR


Disposition: AGAINST MEDICAL ADVICE


Referrals: 


Ruslan Beck [Primary Care Provider] - 





<Mj Rodas - Last Filed: 11/29/17 17:51>





Provider





- Provider


Date of Admission: 


11/27/17 19:14





Attending physician: 


Mj Rodas MD





Primary care physician: 


Ruslan Beck MD








Hospital Course





- Lab Results


Lab Results: 


 Micro Results





11/28/17 14:35   Stool   C. difficile Antigen & Toxin A,B (M - Final





 Most Recent Lab Values











WBC  5.8 10^3/ul (4.5-11.0)  D 11/29/17  06:00    


 


RBC  3.52 10^6/uL (3.5-6.1)   11/29/17  06:00    


 


Hgb  8.5 g/dL (14.0-18.0)  L  11/29/17  06:00    


 


Hct  27.7 % (42.0-52.0)  L  11/29/17  06:00    


 


MCV  78.7 fl (80.0-105.0)  L  11/29/17  06:00    


 


MCH  24.1 pg (25.0-35.0)  L  11/29/17  06:00    


 


MCHC  30.7 g/dl (31.0-37.0)  L  11/29/17  06:00    


 


RDW  14.6 % (11.5-14.5)  H  11/29/17  06:00    


 


Plt Count  398 10^3/uL (120.0-450.0)   11/29/17  06:00    


 


MPV  9.1 fl (7.0-11.0)   11/29/17  06:00    


 


Gran %  50.9 % (50.0-68.0)   11/29/17  06:00    


 


Lymph % (Auto)  34.0 % (22.0-35.0)   11/29/17  06:00    


 


Mono % (Auto)  10.6 % (1.0-6.0)  H  11/29/17  06:00    


 


Eos % (Auto)  3.5 % (1.5-5.0)   11/29/17  06:00    


 


Baso % (Auto)  1.0 % (0.0-3.0)   11/29/17  06:00    


 


Gran #  2.93  (1.4-6.5)   11/29/17  06:00    


 


Lymph #  2.0  (1.2-3.4)   11/29/17  06:00    


 


Mono #  0.6  (0.1-0.6)   11/29/17  06:00    


 


Eos #  0.2  (0.0-0.7)   11/29/17  06:00    


 


Baso #  0.06 K/mm3 (0.0-2.0)   11/29/17  06:00    


 


pO2  32 mm/Hg (30-55)   11/27/17  15:55    


 


VBG pH  7.27  (7.32-7.43)  L  11/27/17  15:55    


 


VBG pCO2  54.0  (40-60)   11/27/17  15:55    


 


VBG HCO3  24.8 mmol/l (21-28)   11/27/17  15:55    


 


VBG Total CO2  26.5 mmol.L (22-28)   11/27/17  15:55    


 


VBG O2 Sat (Calc)  55.0 % (40-65)   11/27/17  15:55    


 


VBG Base Excess  -2.9 mmol/L (0.0-2.0)  L  11/27/17  15:55    


 


VBG Potassium  5.2 mmol/L (3.6-5.2)   11/27/17  15:55    


 


Sodium  138.0 mmol/L (132-148)   11/27/17  15:55    


 


Chloride  108.0 mmol/L ()  H  11/27/17  15:55    


 


Glucose  101 mg/dl ()   11/27/17  15:55    


 


Lactate  1.6 mmol/L (0.7-2.1)   11/27/17  15:55    


 


FiO2  21.0 %  11/27/17  15:55    


 


Sodium  140 mmol/L (132-148)   11/29/17  06:00    


 


Potassium  4.2 mmol/L (3.6-5.0)   11/29/17  06:00    


 


Chloride  106 mmol/L ()   11/29/17  06:00    


 


Carbon Dioxide  25 mmol/L (21-33)   11/29/17  06:00    


 


Anion Gap  14  (10-20)   11/29/17  06:00    


 


BUN  30 mg/dL (7-21)  H  11/29/17  06:00    


 


Creatinine  2.2 mg/dl (0.8-1.5)  H  11/29/17  06:00    


 


Est GFR ( Amer)  36   11/29/17  06:00    


 


Est GFR (Non-Af Amer)  30   11/29/17  06:00    


 


Random Glucose  94 mg/dL ()   11/29/17  06:00    


 


Calcium  8.5 mg/dL (8.4-10.5)   11/29/17  06:00    


 


Phosphorus  5.0 mg/dL (2.5-4.5)  H  11/29/17  06:00    


 


Magnesium  1.8 mg/dL (1.7-2.2)   11/29/17  06:00    


 


Iron  24 ug/dL ()  L  11/29/17  06:00    


 


TIBC  184 ug/dL (261-462)  L  11/29/17  06:00    


 


% Saturation  13 % (20-55)  L  11/29/17  06:00    


 


Ferritin  149.0 ng/mL  11/29/17  06:00    


 


Total Bilirubin  0.3 mg/dL (0.2-1.3)   11/29/17  06:00    


 


AST  33 U/L (17-59)   11/29/17  06:00    


 


ALT  25 U/L (7-56)   11/29/17  06:00    


 


Alkaline Phosphatase  63 U/L ()   11/29/17  06:00    


 


Total Protein  7.0 g/dL (5.8-8.3)   11/29/17  06:00    


 


Albumin  3.2 g/dL (3.0-4.8)   11/29/17  06:00    


 


Globulin  3.7 gm/dL  11/29/17  06:00    


 


Albumin/Globulin Ratio  0.9  (1.1-1.8)  L  11/29/17  06:00    


 


Lipase  79 U/L ()   11/27/17  15:55    


 


Vitamin B12  434 pg/mL (239-931)   11/29/17  06:00    


 


Folate  4.9 ng/mL  11/29/17  06:00    


 


Venous Blood Potassium  5.2 mmol/L (3.6-5.2)   11/27/17  15:55    


 


Urine Color  Yellow  (YELLOW)   11/27/17  18:04    


 


Urine Appearance  Sl cloudy  (CLEAR)   11/27/17  18:04    


 


Urine pH  6.0  (4.7-8.0)   11/27/17  18:04    


 


Ur Specific Gravity  1.015  (1.005-1.035)   11/27/17  18:04    


 


Urine Protein  30 mg/dL (<30 mg/dL)  H  11/27/17  18:04    


 


Urine Glucose (UA)  Negative mg/dL (NEGATIVE)   11/27/17  18:04    


 


Urine Ketones  Negative mg/dL (NEGATIVE)   11/27/17  18:04    


 


Urine Blood  Small  (NEGATIVE)  H  11/27/17  18:04    


 


Urine Nitrate  Negative  (NEGATIVE)   11/27/17  18:04    


 


Urine Bilirubin  Negative  (NEGATIVE)   11/27/17  18:04    


 


Urine Urobilinogen  0.2 E.U./dL (<1 E.U./dL)   11/27/17  18:04    


 


Ur Leukocyte Esterase  Moderate Sandra/uL (NEGATIVE)  H  11/27/17  18:04    


 


Urine RBC  1 - 3 /hpf (0-2)   11/27/17  18:04    


 


Urine WBC  15 - 20 /hpf (0-6)   11/27/17  18:04    


 


Ur Epithelial Cells  1 - 3 /hpf (0-5)   11/27/17  18:04    


 


Urine Bacteria  Many  (NEG)   11/27/17  18:04    


 


Ur Random Creatinine  80 mg/dL  11/28/17  11:17    


 


Ur Random Sodium  63 meq/L  11/28/17  11:17    


 


HIV 1&2 Antibody Screen  Negative  (NEGATIVE)   11/28/17  13:20    














Attending/Attestation





- Attestation


I have personally seen and examined this patient.: Yes


I have fully participated in the care of the patient.: Yes


I have reviewed all pertinent clinical information, including history, physical 

exam and plan: Yes


Notes (Text): 





Patient eloped.

## 2017-11-29 NOTE — CP.PCM.PN
Subjective





- Date & Time of Evaluation


Date of Evaluation: 11/29/17


Time of Evaluation: 11:05





- Subjective


Subjective: 





Comfortable in bed, no fevers, patient is not happy about needing 2 weeks of 

antibiotics because he apparently has a meeting to attend to, but wants to 

attend the meeting then come back for the antibiotics.





Objective





- Vital Signs/Intake and Output


Vital Signs (last 24 hours): 


 











Temp Pulse Resp BP Pulse Ox


 


 97 F L  68   18   90/50 L  98 


 


 11/28/17 15:58  11/28/17 15:58  11/28/17 15:58  11/28/17 15:58  11/28/17 15:58








Intake and Output: 


 











 11/29/17 11/29/17





 06:59 18:59


 


Intake Total 1180 


 


Balance 1180 














- Medications


Medications: 


 Current Medications





Aspirin (Aspirin Chewable)  81 mg PO DAILY [facility]


   Last Admin: 11/28/17 10:19 Dose:  81 mg


Hydromorphone HCl (Dilaudid)  1 mg IVP Q4H PRN


   PRN Reason: Pain, severe (8-10)


   Last Admin: 11/28/17 05:40 Dose:  1 mg


Meropenem 1 gm/ Dextrose  100 mls @ 100 mls/hr IVPB Q12 ADAL


   PRN Reason: Protocol


   Stop: 12/05/17 10:01


   Last Admin: 11/28/17 21:32 Dose:  100 mls/hr


Sodium Chloride (Sodium Chloride 0.9%)  1,000 mls @ 75 mls/hr IV .[id number] [facility]


   Last Admin: 11/28/17 13:42 Dose:  75 mls/hr


Lisinopril (Zestril)  5 mg PO DAILY [facility]


   Last Admin: 11/28/17 10:17 Dose:  5 mg


Meclizine HCl (Antivert)  25 mg PO Q8 [facility]


   Last Admin: 11/29/17 05:27 Dose:  Not Given


Metoprolol Tartrate (Lopressor)  25 mg PO DAILY [facility]


   Last Admin: 11/28/17 10:19 Dose:  25 mg


Ondansetron HCl (Zofran Inj)  2 mg IVP Q6 PRN


   PRN Reason: Nausea/Vomiting


   Last Admin: 11/28/17 05:49 Dose:  2 mg


Oxycodone/Acetaminophen (Percocet 5/325 Mg Tab)  1 tab PO Q4H PRN


   PRN Reason: Pain, Mild (1-3)


   Stop: 11/30/17 21:02


   Last Admin: 11/28/17 18:23 Dose:  1 tab


Pantoprazole Sodium (Protonix Ec Tab)  40 mg PO DAILY ADAL











- Labs


Labs: 


 





 11/28/17 05:20 





 11/28/17 05:20 











- Constitutional


Appears: Non-toxic





- Head Exam


Head Exam: NORMAL INSPECTION





- ENT Exam


ENT Exam: Mucous Membranes Moist





- Neck Exam


Neck Exam: absent: Lymphadenopathy, Meningismus





- Respiratory Exam


Respiratory Exam: Decreased Breath Sounds





- Cardiovascular Exam


Cardiovascular Exam: +S1, +S2





- GI/Abdominal Exam


GI & Abdominal Exam: Soft.  absent: Tenderness





Assessment and Plan





- Assessment and Plan (Free Text)


Plan: 





Assessment


Complicated UTI associated with indwelling Chin catheter, with ESBL Klebsiella 

bacteremia and right sided obstructive uropathy


HTN


DM


CAD


indwelling Chin catheter since May 2017


S/P inguinal hernia repair 





Plan


continue Merrem day 2; repeat blood are negative so far; repeat urine cx are 

pending; reviewed CT A/P


follow up Urology evaluation and recommendations - patient would need at least 

2 weeks of antibiotics

## 2017-11-29 NOTE — PN
DATE:



Urine culture:  Gram-negative rods.  Blood culture:  No growth.



X-ray of the abdomen:  No stone seen.  CT of the abdomen shows right

hydronephrosis, dilatation all the way up to the bladder.



CURRENT MEDICATIONS:  Antivert 25 q. 8, aspirin 81, Dilaudid 1 mg q. 4,

Lopressor 25, meropenem 1 g q. 12, Percocet, Protonix, normal saline 75,

Zestril 5, Zofran 2 mg.



ASSESSMENT:

1.  Acute kidney injury superimposed on chronic kidney disease ?

2.  Worsening renal function.

3.  Obstructive uropathy.

4.  Gram-negative urinary tract infection.

5.  Underlying noninsulin-dependent diabetes mellitus.

6.  Awaiting Urology consult.



PLAN:

1.  Continue antibiotics for gram-negative UTI.

2.  Continue IV fluids.

3.  Check stool occult x3.

4.  Iron infusion.

5.  Monitor urine output closely.

6.  Monitor daily labs.

7.  Hold Zestril.



__________________________________________

Macy Smith MD





DD:  11/29/2017 12:35:55

DT:  11/29/2017 13:13:58

Job # [id number]

## 2017-11-30 NOTE — PN
DATE:  11/29/2017



SUBJECTIVE:  The patient is seen, lying in bed.  He appears comfortable. 

He does not appear to be in any kind of distress.



OBJECTIVE:

VITAL SIGNS:  Blood pressure 135/78, heart rate 58, respiratory rate 18,

temperature 98.3.

HEENT:  Normocephalic, atraumatic.

NECK:  Supple, no JVD.

LUNGS:  Bilateral equal air entry, no rales.

CARDIAC:  S1, S2.  Regular rate and rhythm.  No murmur.  No rub.

ABDOMEN:  Soft, nondistended, nontender, bowel sounds present.

EXTREMITIES:  No lower extremity edema.

INTAKE AND OUTPUT:  1905/375



LABORATORY DATA:  WBC 5.8, hemoglobin 8.5, hematocrit 27.7 and platelets

398.  Sodium 140, potassium 4.2, chloride 106, CO2 of 25, BUN 30,

creatinine 2.2, glucose 94, calcium 8.5, phosphorus 5.0 magnesium 1.8,

saturation 13%, iron _____.





Incomplete Dictation.





__________________________________________

Macy Smith MD





DD:  11/29/2017 12:32:04

DT:  11/29/2017 12:33:23

Job # [id number]

## 2017-12-24 ENCOUNTER — HOSPITAL ENCOUNTER (EMERGENCY)
Dept: HOSPITAL 31 - C.ER | Age: 67
Discharge: HOME | End: 2017-12-24
Payer: COMMERCIAL

## 2017-12-24 VITALS — SYSTOLIC BLOOD PRESSURE: 142 MMHG | DIASTOLIC BLOOD PRESSURE: 70 MMHG | TEMPERATURE: 98.3 F | HEART RATE: 68 BPM

## 2017-12-24 VITALS — RESPIRATION RATE: 16 BRPM

## 2017-12-24 VITALS — OXYGEN SATURATION: 99 %

## 2017-12-24 VITALS — BODY MASS INDEX: 24.2 KG/M2

## 2017-12-24 DIAGNOSIS — Z96.0: ICD-10-CM

## 2017-12-24 DIAGNOSIS — N39.0: Primary | ICD-10-CM

## 2017-12-24 LAB
BACTERIA #/AREA URNS HPF: (no result) /[HPF]
BILIRUB UR-MCNC: NEGATIVE MG/DL
GLUCOSE UR STRIP-MCNC: NORMAL MG/DL
KETONES UR STRIP-MCNC: NEGATIVE MG/DL
LEUKOCYTE ESTERASE UR-ACNC: (no result) LEU/UL
PH UR STRIP: 5 [PH] (ref 5–8)
PROT UR STRIP-MCNC: (no result) MG/DL
RBC # UR STRIP: (no result) /UL
RBC #/AREA URNS HPF: 49 /HPF (ref 0–3)
SP GR UR STRIP: 1.01 (ref 1–1.03)
TRANS CELLS #/AREA URNS HPF: 2 /HPF (ref 0–3)
UROBILINOGEN UR-MCNC: NORMAL MG/DL (ref 0.2–1)
WBC #/AREA URNS HPF: 1989 /HPF (ref 0–5)
WBC CLUMPS # UR AUTO: (no result) /HPF

## 2017-12-24 NOTE — C.PDOC
History Of Present Illness


67 year old male presents to ED for evaluation of suprapubic abdominal 

discomfort described as burning sensation. Pt has an indwelling dalal catheter 

present and states he has foul smelling urine. Denies n/v/d, constipation, back 

pain, fever, or chills.


 


Time Seen by Provider: 12/24/17 16:05


Chief Complaint (Nursing): Male Genitourinary


History Per: Patient


History/Exam Limitations: no limitations


Onset/Duration Of Symptoms: Days


Current Symptoms Are (Timing): Still Present


Quality Of Discomfort: Burning


Associated Symptoms: Urinary Symptoms.  denies: Loss Of Appetite, Back Pain, 

Chest Pain, Constipation


Alleviating Factors: None


Recent travel outside of the [place]: No


Additional History Per: Patient





Past Medical History


Reviewed: Historical Data, Nursing Documentation, Vital Signs


Vital Signs: 


 Last Vital Signs











Temp  98.3 F   12/24/17 17:27


 


Pulse  68   12/24/17 17:27


 


Resp  16   12/24/17 17:27


 


BP  142/70   12/24/17 17:27


 


Pulse Ox  99   12/24/17 18:28














- Medical History


PMH: CAD, Diabetes, HTN, Hypercholesterolemia, Hyperlipidemia


   Denies: HIV, Chronic Kidney Disease


Surgical History: Hernia Repair (right inguinal)





- [facility] Procedures








MEASURE OF CARDIAC SAMPL & PRESSURE, L HEART, PERC APPROACH (08/05/17)


PLAIN RADIOGRAPHY OF RIGHT AND LEFT HEART USING OTH CONTRAST (08/05/17)








Family History: States: Unknown Family Hx





- Social History


Hx Alcohol Use: No


Hx Substance Use: No





- Immunization History


Hx Tetanus Toxoid Vaccination: No


Hx Influenza Vaccination: No


Hx Pneumococcal Vaccination: No





Review Of Systems


Except As Marked, All Systems Reviewed And Found Negative.


Constitutional: Negative for: Fever, Chills


Cardiovascular: Negative for: Chest Pain, Palpitations


Respiratory: Negative for: Cough, Shortness of Breath


Gastrointestinal: Positive for: Abdominal Pain.  Negative for: Nausea, Vomiting

, Diarrhea, Constipation


Musculoskeletal: Negative for: Back Pain


Neurological: Negative for: Headache, Dizziness





Physical Exam





- Physical Exam


Appears: Non-toxic, Other (elderly male, in mild distress)


Skin: Normal Color, Warm, Dry


Head: Atraumatic, Normacephalic


Eye(s): bilateral: Normal Inspection


Oral Mucosa: Moist


Chest: Symmetrical


Cardiovascular: Rhythm Regular, No Murmur


Respiratory: Normal Breath Sounds, No Rales, No Rhonchi, No Wheezing


Gastrointestinal/Abdominal: Soft, Tenderness (mild suprapubic), No Guarding, No 

Rebound


Male Genital: Other (indwelling dalal with cloudy urine)


Extremity: Normal ROM, No Pedal Edema


Neurological/Psych: Oriented x3, Normal Speech





ED Course And Treatment





- Laboratory Results


Lab Interpretation: Abnormal (UA 2000 WBC's)


O2 Sat by Pulse Oximetry: 99 (RA)


Pulse Ox Interpretation: Normal


Progress Note: dalal replaced, UA sent.  Cipro PO and Pyridium PO





Medical Decision Making


Medical Decision Making: 





chronic indwelling Dalal Cath with UTI


pt has "no" Urologist- referred to KIRBY Duncan- Urology On Call- for further eval








Disposition


Doctor Will See Patient In The: Office


Counseled Patient/Family Regarding: Studies Performed, Diagnosis





- Disposition


Referrals: 


Ernesto Duncan MD [Staff Provider] - 


Disposition: HOME/ ROUTINE


Disposition Time: 17:17


Condition: GOOD


Additional Instructions: 


continue Cipro 500 mg twice a day for 7 days (antibiotic)


Continue Pyridium 100 mg every 6 hours as needed for Bladder Discomfort





Call to make an appointment with Dr. KIRBY Duncan- Urologist


He will help you with your chronic Urology issues.


Prescriptions: 


Ciprofloxacin [Cipro] 1 tab PO BID #14 tab


Phenazopyridine HCl [Pyridium] 100 mg PO TID PRN #6 tablet


 PRN Reason: dysuria


Instructions:  Urinary Tract Infection in Men (ED), Urinary Leg Bag (GEN)


Forms:  [facility] (English)





- Clinical Impression


Clinical Impression: 


 Indwelling Dalal catheter present, UTI (urinary tract infection)








- Scribe Statement


The provider has reviewed the documentation as recorded by the Rockyibrudy Escobedo





All medical record entries made by the Rockyibe were at my direction and 

personally dictated by me. I have reviewed the chart and agree that the record 

accurately reflects my personal performance of the history, physical exam, 

medical decision making, and the department course for this patient. I have 

also personally directed, reviewed, and agree with the discharge instructions 

and disposition.

## 2018-01-22 ENCOUNTER — HOSPITAL ENCOUNTER (EMERGENCY)
Dept: HOSPITAL 31 - C.ER | Age: 68
Discharge: HOME | End: 2018-01-22
Payer: MEDICARE

## 2018-01-22 VITALS
OXYGEN SATURATION: 96 % | TEMPERATURE: 98 F | HEART RATE: 76 BPM | SYSTOLIC BLOOD PRESSURE: 146 MMHG | DIASTOLIC BLOOD PRESSURE: 91 MMHG

## 2018-01-22 VITALS — RESPIRATION RATE: 18 BRPM

## 2018-01-22 VITALS — BODY MASS INDEX: 24.2 KG/M2

## 2018-01-22 DIAGNOSIS — R10.9: ICD-10-CM

## 2018-01-22 DIAGNOSIS — N39.0: Primary | ICD-10-CM

## 2018-01-22 DIAGNOSIS — I10: ICD-10-CM

## 2018-01-22 LAB
ALBUMIN SERPL-MCNC: 3.9 G/DL (ref 3.5–5)
ALBUMIN/GLOB SERPL: 1 {RATIO} (ref 1–2.1)
ALT SERPL-CCNC: 16 U/L (ref 21–72)
AST SERPL-CCNC: 20 U/L (ref 17–59)
BACTERIA #/AREA URNS HPF: (no result) /[HPF]
BASOPHILS # BLD AUTO: 0 K/UL (ref 0–0.2)
BASOPHILS NFR BLD: 0.2 % (ref 0–2)
BILIRUB UR-MCNC: NEGATIVE MG/DL
BUN SERPL-MCNC: 25 MG/DL (ref 9–20)
CALCIUM SERPL-MCNC: 8.4 MG/DL (ref 8.6–10.4)
EOSINOPHIL # BLD AUTO: 0.2 K/UL (ref 0–0.7)
EOSINOPHIL NFR BLD: 2.6 % (ref 0–4)
ERYTHROCYTE [DISTWIDTH] IN BLOOD BY AUTOMATED COUNT: 18.2 % (ref 11.5–14.5)
GFR NON-AFRICAN AMERICAN: 46
GLUCOSE UR STRIP-MCNC: NORMAL MG/DL
HGB BLD-MCNC: 9.7 G/DL (ref 12–18)
LEUKOCYTE ESTERASE UR-ACNC: (no result) LEU/UL
LIPASE: 263 U/L (ref 23–300)
LYMPHOCYTES # BLD AUTO: 1.5 K/UL (ref 1–4.3)
LYMPHOCYTES NFR BLD AUTO: 21.3 % (ref 20–40)
MCH RBC QN AUTO: 25 PG (ref 27–31)
MCHC RBC AUTO-ENTMCNC: 32.3 G/DL (ref 33–37)
MCV RBC AUTO: 77.4 FL (ref 80–94)
MONOCYTES # BLD: 0.3 K/UL (ref 0–0.8)
MONOCYTES NFR BLD: 4.7 % (ref 0–10)
NEUTROPHILS # BLD: 5 K/UL (ref 1.8–7)
NEUTROPHILS NFR BLD AUTO: 71.2 % (ref 50–75)
NRBC BLD AUTO-RTO: 0 % (ref 0–2)
PH UR STRIP: 5 [PH] (ref 5–8)
PLATELET # BLD: 415 K/UL (ref 130–400)
PMV BLD AUTO: 7.1 FL (ref 7.2–11.7)
PROT UR STRIP-MCNC: (no result) MG/DL
RBC # BLD AUTO: 3.89 MIL/UL (ref 4.4–5.9)
RBC # UR STRIP: NEGATIVE /UL
SP GR UR STRIP: 1.01 (ref 1–1.03)
URINE NITRATE: NEGATIVE
UROBILINOGEN UR-MCNC: NORMAL MG/DL (ref 0.2–1)
WBC # BLD AUTO: 7 K/UL (ref 4.8–10.8)

## 2018-01-22 PROCEDURE — 96375 TX/PRO/DX INJ NEW DRUG ADDON: CPT

## 2018-01-22 PROCEDURE — 96365 THER/PROPH/DIAG IV INF INIT: CPT

## 2018-01-22 PROCEDURE — 85025 COMPLETE CBC W/AUTO DIFF WBC: CPT

## 2018-01-22 PROCEDURE — 87086 URINE CULTURE/COLONY COUNT: CPT

## 2018-01-22 PROCEDURE — 83690 ASSAY OF LIPASE: CPT

## 2018-01-22 PROCEDURE — 80053 COMPREHEN METABOLIC PANEL: CPT

## 2018-01-22 PROCEDURE — 99285 EMERGENCY DEPT VISIT HI MDM: CPT

## 2018-01-22 PROCEDURE — 81001 URINALYSIS AUTO W/SCOPE: CPT

## 2018-01-22 NOTE — C.PDOC
History Of Present Illness





Patient BIBA for right sided flank pain since approx midnight.  He admits to 

nausea and vomiting.  Patient denies fever, chest pain, SOB, diarrhea, dysuria.

  He has indwelling dalal catheter that he states has been present since hernia 

surgery May 2017.  He states he does not have urologist, and that he 

periodically comes to ER for dalal catehter change.  Current dalal has been in 

place for approx 1 month.


Time Seen by Provider: 01/22/18 05:43


Chief Complaint (Nursing): Back Pain


History Per: Patient


History/Exam Limitations: no limitations


Onset/Duration Of Symptoms: Hrs


Current Symptoms Are (Timing): Still Present


Quality Of Discomfort: "Pain"


Severity: Moderate





Past Medical History


Reviewed: Historical Data, Nursing Documentation, Vital Signs


Vital Signs: 


 Last Vital Signs











Temp  97.7 F   01/22/18 05:46


 


Pulse  77   01/22/18 06:47


 


Resp  18   01/22/18 06:47


 


BP  175/100 H  01/22/18 06:47


 


Pulse Ox  95   01/22/18 07:04














- Medical History


PMH: CAD, Diabetes, HTN, Hypercholesterolemia, Hyperlipidemia


Surgical History: Hernia Repair (right inguinal)





- [facility] Procedures








MEASURE OF CARDIAC SAMPL & PRESSURE, L HEART, PERC APPROACH (08/05/17)


PLAIN RADIOGRAPHY OF RIGHT AND LEFT HEART USING OTH CONTRAST (08/05/17)








Family History: States: No Known Family Hx





- Social History


Hx Alcohol Use: No


Hx Substance Use: No





- Immunization History


Hx Tetanus Toxoid Vaccination: No


Hx Influenza Vaccination: No


Hx Pneumococcal Vaccination: No





Review Of Systems


Except As Marked, All Systems Reviewed And Found Negative.


Constitutional: Negative for: Fever, Chills


Cardiovascular: Negative for: Chest Pain, Palpitations


Respiratory: Negative for: Cough, Shortness of Breath


Gastrointestinal: Positive for: Nausea, Vomiting, Abdominal Pain, Other (right 

flank pain ).  Negative for: Diarrhea


Genitourinary: Negative for: Dysuria, Hematuria





Physical Exam





- Physical Exam


Appears: Well, Non-toxic, In Acute Distress (in mild to moderate pain )


Eye(s): bilateral: Normal Inspection


Oral Mucosa: Moist


Cardiovascular: Rhythm Regular


Respiratory: Normal Breath Sounds, No Rales, No Rhonchi, No Wheezing


Gastrointestinal/Abdominal: Bowel Sounds, Soft, Tenderness (right periumbilical 

pain), No Distention, No Guarding, No Rebound


Back: CVA Tenderness (right )


Neurological/Psych: Oriented x3





ED Course And Treatment





- Laboratory Results


Result Diagrams: 


 01/22/18 05:40





O2 Sat by Pulse Oximetry: 95 (RA)


Pulse Ox Interpretation: Normal


Progress Note: Blood work, UA ordered and reviewed.  Patient given IV toradol.  

IV rocephin ordered for UTI.





Disposition





- Disposition


Referrals: 


Jonah Yu [Primary Care Provider] - 


Disposition Time: 07:05


Condition: STABLE


Forms:  [facility] (English)





- Clinical Impression


Clinical Impression: 


 UTI (urinary tract infection), Right flank pain








Physician Patient Turnover


Patient Signed Over To: Garrett Giang


Handoff Comments: pending chemistry, reassessment

## 2018-03-21 ENCOUNTER — HOSPITAL ENCOUNTER (EMERGENCY)
Dept: HOSPITAL 31 - C.ER | Age: 68
LOS: 1 days | Discharge: HOME | End: 2018-03-22
Payer: MEDICARE

## 2018-03-21 VITALS — TEMPERATURE: 98.3 F

## 2018-03-21 VITALS — BODY MASS INDEX: 24.2 KG/M2

## 2018-03-21 DIAGNOSIS — S80.02XA: Primary | ICD-10-CM

## 2018-03-21 DIAGNOSIS — N39.0: ICD-10-CM
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
## 2018-03-21 LAB
BACTERIA #/AREA URNS HPF: (no result) /[HPF]
BILIRUB UR-MCNC: NEGATIVE MG/DL
GLUCOSE UR STRIP-MCNC: NORMAL MG/DL
LEUKOCYTE ESTERASE UR-ACNC: (no result) LEU/UL
PH UR STRIP: 5 [PH] (ref 5–8)
PROT UR STRIP-MCNC: (no result) MG/DL
RBC # UR STRIP: (no result) /UL
SP GR UR STRIP: 1.01 (ref 1–1.03)
UROBILINOGEN UR-MCNC: NORMAL MG/DL (ref 0.2–1)
WBC CLUMPS # UR AUTO: (no result) /HPF

## 2018-03-21 NOTE — C.PDOC
History Of Present Illness


67 year old male presents to the ED c/o left knee pain s/p falling earlier 

today. Patient is also c/o hematuria observed in his urinary catheter. Patient 

denies weakness, numbness, abdominal pain, back pain. 


Time Seen by Provider: 03/21/18 22:16


Chief Complaint (Nursing): Lower Extremity Problem/Injury


History Per: Patient


Onset/Duration Of Symptoms: Hrs


Current Symptoms Are (Timing): Still Present


Recent travel outside of the [place]: No


Additional History Per: Patient





- Knee


Description Of Injury: Fell





Past Medical History


Reviewed: Historical Data, Nursing Documentation, Vital Signs


Vital Signs: 


 Last Vital Signs











Temp  98.3 F   03/21/18 21:57


 


Pulse  63   03/21/18 21:57


 


Resp  20   03/21/18 21:57


 


BP  190/87 H  03/21/18 21:57


 


Pulse Ox  100   03/21/18 22:33














- Medical History


PMH: CAD, Diabetes, HTN, Hypercholesterolemia, Hyperlipidemia


   Denies: HIV, Chronic Kidney Disease


Surgical History: Hernia Repair (right inguinal)





- [facility] Procedures








MEASURE OF CARDIAC SAMPL & PRESSURE, L HEART, PERC APPROACH (08/05/17)


PLAIN RADIOGRAPHY OF RIGHT AND LEFT HEART USING OTH CONTRAST (08/05/17)








Family History: States: Unknown Family Hx





- Social History


Hx Alcohol Use: No


Hx Substance Use: No





- Immunization History


Hx Tetanus Toxoid Vaccination: No


Hx Influenza Vaccination: No


Hx Pneumococcal Vaccination: No





Review Of Systems


Constitutional: Negative for: Fever, Chills


Cardiovascular: Negative for: Chest Pain


Respiratory: Negative for: Cough, Shortness of Breath


Gastrointestinal: Negative for: Nausea, Vomiting, Abdominal Pain


Genitourinary: Positive for: Hematuria.  Negative for: Dysuria


Musculoskeletal: Positive for: Leg Pain


Skin: Negative for: Rash


Neurological: Negative for: Weakness, Numbness





Physical Exam





- Physical Exam


Appears: Non-toxic, No Acute Distress


Skin: Normal Color, Warm, Dry


Head: Atraumatic, Normacephalic


Eye(s): bilateral: Normal Inspection


Nose: No Discharge


Oral Mucosa: Moist


Neck: Normal ROM, No Supple


Chest: Symmetrical


Cardiovascular: Rhythm Regular, No Murmur


Respiratory: Normal Breath Sounds, No Rales, No Rhonchi, No Wheezing


Gastrointestinal/Abdominal: Soft, No Tenderness, No Guarding, No Rebound


Male Genital: Other (Inguinal hernia non obstructive, flowy catheter draining 

well dark colored urine )


Extremity: Normal ROM, No Tenderness, Capillary Refill (< 2 seconds), Deformity

, No Swelling


Neurological/Psych: Oriented x3, Normal Motor, Normal Sensation


Gait: Steady





ED Course And Treatment


O2 Sat by Pulse Oximetry: 100 (On RA)


Pulse Ox Interpretation: Normal





Medical Decision Making


Medical Decision Making: 


Impression: hematuria, knee pain


Plan:


* UA











Disposition


Counseled Patient/Family Regarding: Diagnosis





- Disposition


Referrals: 


[facility] [Outside]


Disposition: HOME/ ROUTINE


Disposition Time: 23:44


Condition: STABLE


Prescriptions: 


Ciprofloxacin HCl [Cipro] 500 mg PO BID #14 tablet


Naproxen 375 mg PO TIDPC #20 tablet


Instructions:  Urinary Tract Infections in Adults, Olivo Catheter, Male, How to 

Care for Your Olivo Catheter, Male, Contusion (DC)


Forms:  [facility] (English)





- POA


Present On Arrival: None





- Clinical Impression


Clinical Impression: 


 UTI (urinary tract infection), Contusion








- Scribe Statement


The provider has reviewed the documentation as recorded by the Scribrudy Chung





All medical record entries made by the Scribe were at my direction and 

personally dictated by me. I have reviewed the chart and agree that the record 

accurately reflects my personal performance of the history, physical exam, 

medical decision making, and the department course for this patient. I have 

also personally directed, reviewed, and agree with the discharge instructions 

and disposition.

## 2018-03-22 VITALS
HEART RATE: 89 BPM | SYSTOLIC BLOOD PRESSURE: 169 MMHG | RESPIRATION RATE: 16 BRPM | DIASTOLIC BLOOD PRESSURE: 74 MMHG | OXYGEN SATURATION: 97 %

## 2018-04-16 ENCOUNTER — HOSPITAL ENCOUNTER (EMERGENCY)
Dept: HOSPITAL 31 - C.ER | Age: 68
Discharge: LEFT BEFORE DISCHARGE | End: 2018-04-16
Payer: MEDICAID

## 2018-04-16 VITALS
TEMPERATURE: 98 F | RESPIRATION RATE: 18 BRPM | DIASTOLIC BLOOD PRESSURE: 85 MMHG | OXYGEN SATURATION: 99 % | HEART RATE: 70 BPM | SYSTOLIC BLOOD PRESSURE: 183 MMHG

## 2018-04-16 VITALS — BODY MASS INDEX: 24.2 KG/M2

## 2018-04-16 DIAGNOSIS — Z02.89: Primary | ICD-10-CM

## 2018-07-10 ENCOUNTER — HOSPITAL ENCOUNTER (OUTPATIENT)
Dept: HOSPITAL 31 - C.ER | Age: 68
Setting detail: OBSERVATION
Discharge: LEFT BEFORE DISCHARGE | End: 2018-07-10
Attending: FAMILY MEDICINE | Admitting: FAMILY MEDICINE
Payer: MEDICAID

## 2018-07-10 VITALS
RESPIRATION RATE: 18 BRPM | HEART RATE: 74 BPM | DIASTOLIC BLOOD PRESSURE: 86 MMHG | TEMPERATURE: 98.3 F | OXYGEN SATURATION: 96 % | SYSTOLIC BLOOD PRESSURE: 152 MMHG

## 2018-07-10 VITALS — BODY MASS INDEX: 24.2 KG/M2

## 2018-07-10 DIAGNOSIS — N18.9: ICD-10-CM

## 2018-07-10 DIAGNOSIS — E11.22: ICD-10-CM

## 2018-07-10 DIAGNOSIS — E78.00: ICD-10-CM

## 2018-07-10 DIAGNOSIS — I12.9: ICD-10-CM

## 2018-07-10 DIAGNOSIS — Z91.14: ICD-10-CM

## 2018-07-10 DIAGNOSIS — Z79.84: ICD-10-CM

## 2018-07-10 DIAGNOSIS — Z96.0: ICD-10-CM

## 2018-07-10 DIAGNOSIS — N17.9: ICD-10-CM

## 2018-07-10 DIAGNOSIS — E78.5: ICD-10-CM

## 2018-07-10 DIAGNOSIS — I25.10: ICD-10-CM

## 2018-07-10 DIAGNOSIS — E87.2: ICD-10-CM

## 2018-07-10 DIAGNOSIS — R07.9: Primary | ICD-10-CM

## 2018-07-10 LAB
ALBUMIN SERPL-MCNC: 4 G/DL (ref 3.5–5)
ALBUMIN/GLOB SERPL: 1 {RATIO} (ref 1–2.1)
ALT SERPL-CCNC: 17 U/L (ref 21–72)
AST SERPL-CCNC: 32 U/L (ref 17–59)
BACTERIA #/AREA URNS HPF: (no result) /[HPF]
BASOPHILS # BLD AUTO: 0 K/UL (ref 0–0.2)
BASOPHILS NFR BLD: 0.4 % (ref 0–2)
BILIRUB UR-MCNC: NEGATIVE MG/DL
BNP SERPL-MCNC: 883 PG/ML (ref 0–900)
BUN SERPL-MCNC: 39 MG/DL (ref 9–20)
CALCIUM SERPL-MCNC: 8.3 MG/DL (ref 8.6–10.4)
EOSINOPHIL # BLD AUTO: 0.5 K/UL (ref 0–0.7)
EOSINOPHIL NFR BLD: 5.9 % (ref 0–4)
ERYTHROCYTE [DISTWIDTH] IN BLOOD BY AUTOMATED COUNT: 15.8 % (ref 11.5–14.5)
GFR NON-AFRICAN AMERICAN: 33
GLUCOSE UR STRIP-MCNC: NORMAL MG/DL
HGB BLD-MCNC: 10.6 G/DL (ref 12–18)
LEUKOCYTE ESTERASE UR-ACNC: (no result) LEU/UL
LYMPHOCYTES # BLD AUTO: 3.1 K/UL (ref 1–4.3)
LYMPHOCYTES NFR BLD AUTO: 38 % (ref 20–40)
MCH RBC QN AUTO: 26.2 PG (ref 27–31)
MCHC RBC AUTO-ENTMCNC: 33.6 G/DL (ref 33–37)
MCV RBC AUTO: 77.8 FL (ref 80–94)
MONOCYTES # BLD: 0.4 K/UL (ref 0–0.8)
MONOCYTES NFR BLD: 5.3 % (ref 0–10)
NEUTROPHILS # BLD: 4.1 K/UL (ref 1.8–7)
NEUTROPHILS NFR BLD AUTO: 50.4 % (ref 50–75)
NRBC BLD AUTO-RTO: 0 % (ref 0–2)
PH UR STRIP: 5 [PH] (ref 5–8)
PLATELET # BLD: 306 K/UL (ref 130–400)
PMV BLD AUTO: 8.3 FL (ref 7.2–11.7)
PROT UR STRIP-MCNC: (no result) MG/DL
RBC # BLD AUTO: 4.06 MIL/UL (ref 4.4–5.9)
RBC # UR STRIP: (no result) /UL
SP GR UR STRIP: 1.01 (ref 1–1.03)
SQUAMOUS EPITHIAL: < 1 /HPF (ref 0–5)
UROBILINOGEN UR-MCNC: NORMAL MG/DL (ref 0.2–1)
WBC # BLD AUTO: 8.1 K/UL (ref 4.8–10.8)

## 2018-07-10 PROCEDURE — 81001 URINALYSIS AUTO W/SCOPE: CPT

## 2018-07-10 PROCEDURE — 76770 US EXAM ABDO BACK WALL COMP: CPT

## 2018-07-10 PROCEDURE — 80053 COMPREHEN METABOLIC PANEL: CPT

## 2018-07-10 PROCEDURE — 71045 X-RAY EXAM CHEST 1 VIEW: CPT

## 2018-07-10 PROCEDURE — 85025 COMPLETE CBC W/AUTO DIFF WBC: CPT

## 2018-07-10 PROCEDURE — 84484 ASSAY OF TROPONIN QUANT: CPT

## 2018-07-10 PROCEDURE — 83880 ASSAY OF NATRIURETIC PEPTIDE: CPT

## 2018-07-10 NOTE — CP.PCM.HP
History of Present Illness





- History of Present Illness


History of Present Illness: 


HPI: 67 year old male with past medical history of hypertension, hyperlipidemia

, coronary artery disease, diabetes mellitus type 2, chronic kidney disease, 

and indwelling catheter (2017) presented to Emergency room to have his 

chronic indwelling catheter changed. Upon review of systems by emergency room 

staff, patient admitted to chest pain and shortness of breath.  Patient states 

he has had these symptoms intermittently for years, usually with exertion, 

however episodes of chest pain and shortness of breath have become more 

frequent in the last month, especially when walking up the 3 flight of steps to 

his apartment. Most recently, patient had an episode of chest pain, shortness 

of breath, and diaphoresis 2 nights ago while he was at rest. He did not take 

anything for the pain. Patient states he went to sleep and when he awoke in the 

morning his symptoms had resolved. Patient states he becomes short of breath 

after taking 10 steps. Sleeps with one pillow. Patient denies fever, chills, 

headache, change in vision, change in hearing, sore throat, dysphagia, 

palpitations, cough, nausea, and vomiting. At the time of my exam, patient has 

no chest pain or shortness of breath.  Unable to obtain further history, as 

patient is a poor historian and unfamiliar with his health conditions. Patient 

had to be redirected multiple times after asking the same question and still 

does not answer the question at hand. Past history was obtained from previous 

records. 





PMD: Unable to obtain. Cardiologist: Dr. Lafleur. 


PMHx: hypertension, hyperlipidemia, coronary artery disease, diabetes mellitus 

type 2, chronic kidney disease, and indwelling catheter (since 2017). PMHx 

obtained from EMR. 


PSHx: cardiac cath 2017. L inguinal hernia repair 2017


Allergy: NKDA


Meds: Aspirin 81mg PO Daily, Metoprolol 50mg PO BID, Lisinopril 10 mg PO daily- 

medication list obtained from patient's pharmacy [phone], as patient was 

unable to provide.


Family Hx: Mother  in her 20-30s due to unknown cause. Father  due to 

"smoking". Patient unable to provide further family history. 


Social History:Denies tobacco, alcohol and illicit drug use. Lives alone in an 

apartment and has a home health aide who visits daily. Retired. Has a brother 

who lives in [place], rest of family lives in [place]. Not . No children. 


Proxy: Ms. Toussaint (Home health aide) [phone]. 


Code status: Patient states that "if he dies, he dies" and does not want the 

use of machines to keep him alive. 








Past Patient History





- Infectious Disease


Hx of Infectious Diseases: None





- Tetanus Immunizations


Tetanus Immunization: Unknown





- Past Medical History & Family History


Past Medical History?: Yes





- Past Social History


Smoking Status: Never Smoked





- CARDIAC


Hx Hypercholesterolemia: Yes


Hx Hypertension: Yes





- PULMONARY


Hx Respiratory Disorders: No





- NEUROLOGICAL


Hx Neurological Disorder: No





- HEENT


Hx HEENT Problems: No





- RENAL


Hx Chronic Kidney Disease: No





- ENDOCRINE/METABOLIC


Hx Endocrine Disorders: Yes


Hx Diabetes Mellitus Type 2: Yes





- HEMATOLOGICAL/ONCOLOGICAL


Hx Human Immunodeficiency Virus (HIV): No





- INTEGUMENTARY


Hx Dermatological Problems: No





- MUSCULOSKELETAL/RHEUMATOLOGICAL


Hx Falls: No





- GASTROINTESTINAL


Hx Gastrointestinal Disorders: Yes


Other/Comment: Hernia repair.  'Colon surgery'





- GENITOURINARY/GYNECOLOGICAL


Hx Genitourinary Disorders: Yes


Hx Prostate Problems: Yes


Hx Urinary Tract Infection: Yes


Other/Comment: Olivo Catheter





- PSYCHIATRIC


Hx Substance Use: No





- SURGICAL HISTORY


Hx Surgeries: Yes


Hx Herniorrhaphy: Yes





- ANESTHESIA


Hx Anesthesia: Yes


Hx Anesthesia Reactions: No


Hx Malignant Hyperthermia: No





Meds


Allergies/Adverse Reactions: 


 Allergies











Allergy/AdvReac Type Severity Reaction Status Date / Time


 


No Known Allergies Allergy   Verified 18 05:43














Physical Exam





- Constitutional


Appears: No Acute Distress





- Head Exam


Head Exam: ATRAUMATIC, NORMAL INSPECTION





- Eye Exam


Eye Exam: EOMI, Normal appearance, PERRL.  absent: Conjunctival injection


Pupil Exam: NORMAL ACCOMODATION





- ENT Exam


ENT Exam: Mucous Membranes Moist





- Neck Exam


Neck exam: Positive for: Normal Inspection





- Respiratory Exam


Respiratory Exam: Clear to Auscultation Bilateral, NORMAL BREATHING PATTERN.  

absent: Rales, Rhonchi, Wheezes





- Cardiovascular Exam


Cardiovascular Exam: +S1, +S2





- GI/Abdominal Exam


GI & Abdominal Exam: Normal Bowel Sounds.  absent: Firm, Guarding


Additional comments: 





1.5 cm well healed scar noted to L inguinal region.





- Extremities Exam


Extremities exam: Positive for: normal inspection, pedal pulses present.  

Negative for: pedal edema, tenderness





- Neurological Exam


Neurological exam: Alert, CN II-XII Intact





- Psychiatric Exam


Psychiatric exam: Normal Mood





- Skin


Skin Exam: Intact, Normal Color, Warm





Results





- Vital Signs


Recent Vital Signs: 





 Last Vital Signs











Temp  98.3 F   07/10/18 17:17


 


Pulse  74   07/10/18 17:17


 


Resp  18   07/10/18 17:24


 


BP  152/86 H  07/10/18 17:17


 


Pulse Ox  96   07/10/18 17:17














- Labs


Result Diagrams: 


 07/10/18 11:21





 07/10/18 11:21


Labs: 





 Laboratory Results - last 24 hr











  07/10/18 07/10/18 07/10/18





  11:21 11:21 13:09


 


WBC  8.1  


 


RBC  4.06 L  


 


Hgb  10.6 L  


 


Hct  31.6 L  


 


MCV  77.8 L  


 


MCH  26.2 L  


 


MCHC  33.6  


 


RDW  15.8 H  


 


Plt Count  306  D  


 


MPV  8.3  


 


Neut % (Auto)  50.4  


 


Lymph % (Auto)  38.0  


 


Mono % (Auto)  5.3  


 


Eos % (Auto)  5.9 H  


 


Baso % (Auto)  0.4  


 


Neut # (Auto)  4.1  


 


Lymph # (Auto)  3.1  


 


Mono # (Auto)  0.4  


 


Eos # (Auto)  0.5  


 


Baso # (Auto)  0.0  


 


Sodium   143 


 


Potassium   4.9 


 


Chloride   112 H 


 


Carbon Dioxide   16 L 


 


Anion Gap   19 


 


BUN   39 H 


 


Creatinine   2.0 H 


 


Est GFR ( Amer)   40 


 


Est GFR (Non-Af Amer)   33 


 


Random Glucose   97 


 


Calcium   8.3 L 


 


Total Bilirubin   0.7 


 


AST   32 


 


ALT   17 L 


 


Alkaline Phosphatase   74 


 


Troponin I   < 0.0120 


 


NT-Pro-B Natriuret Pep   883 


 


Total Protein   8.0 


 


Albumin   4.0 


 


Globulin   4.0 H 


 


Albumin/Globulin Ratio   1.0 


 


Urine Color    Yellow


 


Urine Clarity    Hazy


 


Urine pH    5.0


 


Ur Specific Gravity    1.012


 


Urine Protein    1+ H


 


Urine Glucose (UA)    Normal


 


Urine Ketones    Negative


 


Urine Blood    2+ H


 


Urine Nitrate    Negative


 


Urine Bilirubin    Negative


 


Urine Urobilinogen    Normal


 


Ur Leukocyte Esterase    2+ H


 


Urine WBC (Auto)    59 H


 


Urine RBC (Auto)    16 H


 


Ur Squamous Epith Cells    < 1


 


Urine Bacteria    Occ H














Assessment & Plan





- Date & Time


Date: 07/10/18


Time: 13:00

## 2018-07-10 NOTE — C.PDOC
History Of Present Illness


67-year-old male, presents to the emergency department with complaints of chest 

pain, that is associated with shortness of breath. ongoing for the past two 

weeks. Patient has a dalal catheter inserted. He denies any cough, nausea/

vomiting, fever, or any other associated symptoms. No other complaints at this 

time.


Time Seen by Provider: 07/10/18 11:01


Chief Complaint (Nursing): Chest Pain


History Per: Patient


History/Exam Limitations: no limitations


Current Symptoms Are (Timing): Still Present


Severity: Moderate





Past Medical History


Reviewed: Historical Data, Nursing Documentation, Vital Signs


Vital Signs: 


 Last Vital Signs











Temp  98.1 F   07/10/18 10:48


 


Pulse  72   07/10/18 11:24


 


Resp  18   07/10/18 10:48


 


BP  151/84 H  07/10/18 10:48


 


Pulse Ox  98   07/10/18 12:32














- Medical History


PMH: CAD, Diabetes, HTN, Hypercholesterolemia, Hyperlipidemia


Surgical History: Hernia Repair (right inguinal)





- [facility] Procedures








MEASURE OF CARDIAC SAMPL & PRESSURE, L HEART, PERC APPROACH (17)


PLAIN RADIOGRAPHY OF RIGHT AND LEFT HEART USING OTH CONTRAST (17)








Family History: States: No Known Family Hx





- Social History


Hx Alcohol Use: No


Hx Substance Use: No





- Immunization History


Hx Tetanus Toxoid Vaccination: No


Hx Influenza Vaccination: No


Hx Pneumococcal Vaccination: No





Review Of Systems


Constitutional: Negative for: Fever, Chills


Cardiovascular: Positive for: Chest Pain.  Negative for: Palpitations, Edema, 

Light Headedness


Respiratory: Positive for: Shortness of Breath.  Negative for: Wheezing


Gastrointestinal: Negative for: Nausea, Vomiting


Musculoskeletal: Negative for: Back Pain





Physical Exam





- Physical Exam


Appears: Non-toxic, No Acute Distress


Skin: Normal Color, Warm, Dry, No Rash


Head: Atraumatic, Normacephalic


Eye(s): bilateral: Normal Inspection, PERRL, EOMI


Nose: Normal


Oral Mucosa: Moist


Lips: Normal Appearing


Neck: Normal ROM


Chest: Symmetrical


Cardiovascular: Rhythm Regular, No Murmur


Respiratory: Normal Breath Sounds, No Accessory Muscle Use


Gastrointestinal/Abdominal: Soft, No Tenderness, No Guarding, No Rebound


Back: Normal Inspection


Extremity: Normal ROM, No Deformity, No Swelling


Neurological/Psych: Oriented x3, Normal Speech





ED Course And Treatment





- Laboratory Results


Result Diagrams: 


 07/10/18 11:21





 07/10/18 11:21


ECG: Interpreted By Me, Viewed By Me


ECG Rhythm: Sinus Rhythm


Interpretation Of ECG: NSR with left axis deviation, normal intervals, and no ST

/ T wave abnormalities


Rate From EC


O2 Sat by Pulse Oximetry: 98 (RA)


Pulse Ox Interpretation: Normal





Disposition


Discussed With DrSharifa: Marco Antonio Escobedo


Doctor Will See Patient In The: Hospital


Counseled Patient/Family Regarding: Studies Performed, Diagnosis





- Disposition


Disposition: HOSPITALIZED


Disposition Time: 12:31


Condition: FAIR





- Clinical Impression


Clinical Impression: 


 Chest pain








- Scribe Statement


The provider has reviewed the documentation as recorded by the Scribe (Dipak Crain)








All medical record entries made by the Scribe were at my direction and 

personally dictated by me. I have reviewed the chart and agree that the record 

accurately reflects my personal performance of the history, physical exam, 

medical decision making, and the department course for this patient. I have 

also personally directed, reviewed, and agree with the discharge instructions 

and disposition.

## 2018-07-10 NOTE — CP.PCM.CON
<Grabiel Jarvis THEA - Last Filed: 07/10/18 15:06>





History of Present Illness





- History of Present Illness


History of Present Illness: 


PGY-2 nephrology consult for Dr Wagner.





Mr Marshall is a 67 year old male with a PMHx of HTN, multi-vessel CAD (

reluctant to have cabg), CHF, NIDDM2, HLD, long term indwelling dalal catheter (

since 6/2017), prior infected right inguinal hernia mesh, and hx of multiple 

AMAs who presents to the ER with complaints of chest pain and shortness of 

breath for the past 2 days. He stated that at baseline he becomes short of 

breath after walking 1 block. Patient stated he does not have a urologist even 

though he has had a dalal in place for past 1 year. He stated that the dalal 

was inserted because he could not urine on his own after his right inguinal 

hernia repair on 5/2017 at [facility]. He said after that procedure he has been 

reluctant to have any further surgeries. He currently denied chest pain and was 

breathing comfortably on room air. He denied fevers.  





PMD: ADILSON Soares


Cardio: Dr Gross





PMHx: HTN, multi-vessel CAD (reluctant to have cabg), diastolic CHF, NIDDM2, HLD

, long term indwelling dalal catheter (since 6/2017), and prior infected right 

inguinal hernia mesh


PSHx: right inguinal hernia repair at [facility] 5/2017, cardiac cath with Dr Lafleur 8/2017 showed multi-vessel disease however patient reluctant to have 

cabg


Home Meds: patient could not recall and did not have a list


Allergies: NKDA








Review of Systems





- Constitutional


Constitutional: Chills.  absent: Fever





- EENT


Eyes: absent: Blurred Vision





- Cardiovascular


Cardiovascular: Dyspnea on Exertion.  absent: Chest Pain, Chest Pain at Rest, 

Dyspnea, Lightheadedness, Orthopnea





- Respiratory


Respiratory: Dyspnea on Exertion.  absent: Cough, Hemoptysis





- Gastrointestinal


Gastrointestinal: absent: Abdominal Pain, Constipation, Diarrhea





- Genitourinary


Genitourinary: Urinary Hesitance





- Integumentary


Integumentary: Change in Pigmentation





- Neurological


Neurological: absent: Dizziness, Focal Weakness





Past Patient History





- Infectious Disease


Hx of Infectious Diseases: None





- Tetanus Immunizations


Tetanus Immunization: Unknown





- Past Medical History & Family History


Past Medical History?: Yes





- Past Social History


Smoking Status: Never Smoked





- CARDIAC


Hx Hypercholesterolemia: Yes


Hx Hypertension: Yes





- PULMONARY


Hx Respiratory Disorders: No





- NEUROLOGICAL


Hx Neurological Disorder: No





- HEENT


Hx HEENT Problems: No





- RENAL


Hx Chronic Kidney Disease: No





- ENDOCRINE/METABOLIC


Hx Endocrine Disorders: Yes


Hx Diabetes Mellitus Type 2: Yes





- HEMATOLOGICAL/ONCOLOGICAL


Hx Human Immunodeficiency Virus (HIV): No





- INTEGUMENTARY


Hx Dermatological Problems: No





- MUSCULOSKELETAL/RHEUMATOLOGICAL


Hx Falls: No





- GASTROINTESTINAL


Hx Gastrointestinal Disorders: Yes


Other/Comment: Hernia repair.  'Colon surgery'





- GENITOURINARY/GYNECOLOGICAL


Hx Genitourinary Disorders: Yes


Hx Prostate Problems: Yes


Hx Urinary Tract Infection: Yes


Other/Comment: Dalal Catheter





- PSYCHIATRIC


Hx Substance Use: No





- SURGICAL HISTORY


Hx Surgeries: Yes


Hx Herniorrhaphy: Yes





- ANESTHESIA


Hx Anesthesia: Yes


Hx Anesthesia Reactions: No


Hx Malignant Hyperthermia: No





Meds


Allergies/Adverse Reactions: 


 Allergies











Allergy/AdvReac Type Severity Reaction Status Date / Time


 


No Known Allergies Allergy   Verified 01/22/18 05:43














Physical Exam





- Constitutional


Appears: Well, Non-toxic, No Acute Distress





- Head Exam


Head Exam: ATRAUMATIC, NORMAL INSPECTION





- Eye Exam


Eye Exam: EOMI


Pupil Exam: PERRL





- ENT Exam


ENT Exam: Mucous Membranes Moist





- Neck Exam


Neck exam: Positive for: Normal Inspection.  Negative for: Lymphadenopathy, 

Tenderness





- Respiratory Exam


Respiratory Exam: Clear to Auscultation Bilateral, NORMAL BREATHING PATTERN.  

absent: Rales, Rhonchi, Wheezes





- Cardiovascular Exam


Cardiovascular Exam: REGULAR RHYTHM, +S1, +S2.  absent: Bradycardia, Tachycardia

, JVD, Systolic Murmur





- GI/Abdominal Exam


GI & Abdominal Exam: Normal Bowel Sounds, Soft.  absent: Distended, Firm, 

Guarding, Hernia, Tenderness





-  Exam


Additional comments: 


dalal in place outputting normal appearing urine








- Extremities Exam


Extremities exam: Positive for: normal capillary refill, normal inspection, 

pedal pulses present.  Negative for: calf tenderness, joint swelling, pedal 

edema, tenderness





- Neurological Exam


Neurological exam: Alert, Oriented x3





- Skin


Skin Exam: Dry, Intact, Normal Color, Warm


Additional comments: 


vitiligo patches on left arm and right shin








Results





- Vital Signs


Recent Vital Signs: 


 Last Vital Signs











Temp  98.1 F   07/10/18 10:48


 


Pulse  68   07/10/18 13:49


 


Resp  16   07/10/18 13:49


 


BP  176/78 H  07/10/18 13:49


 


Pulse Ox  100   07/10/18 13:49














- Labs


Result Diagrams: 


 07/10/18 11:21





 07/10/18 11:21


Labs: 


 Laboratory Results - last 24 hr











  07/10/18 07/10/18 07/10/18





  11:21 11:21 13:09


 


WBC  8.1  


 


RBC  4.06 L  


 


Hgb  10.6 L  


 


Hct  31.6 L  


 


MCV  77.8 L  


 


MCH  26.2 L  


 


MCHC  33.6  


 


RDW  15.8 H  


 


Plt Count  306  D  


 


MPV  8.3  


 


Neut % (Auto)  50.4  


 


Lymph % (Auto)  38.0  


 


Mono % (Auto)  5.3  


 


Eos % (Auto)  5.9 H  


 


Baso % (Auto)  0.4  


 


Neut # (Auto)  4.1  


 


Lymph # (Auto)  3.1  


 


Mono # (Auto)  0.4  


 


Eos # (Auto)  0.5  


 


Baso # (Auto)  0.0  


 


Sodium   143 


 


Potassium   4.9 


 


Chloride   112 H 


 


Carbon Dioxide   16 L 


 


Anion Gap   19 


 


BUN   39 H 


 


Creatinine   2.0 H 


 


Est GFR ( Amer)   40 


 


Est GFR (Non-Af Amer)   33 


 


Random Glucose   97 


 


Calcium   8.3 L 


 


Total Bilirubin   0.7 


 


AST   32 


 


ALT   17 L 


 


Alkaline Phosphatase   74 


 


Troponin I   < 0.0120 


 


NT-Pro-B Natriuret Pep   883 


 


Total Protein   8.0 


 


Albumin   4.0 


 


Globulin   4.0 H 


 


Albumin/Globulin Ratio   1.0 


 


Urine Color    Yellow


 


Urine Clarity    Hazy


 


Urine pH    5.0


 


Ur Specific Gravity    1.012


 


Urine Protein    1+ H


 


Urine Glucose (UA)    Normal


 


Urine Ketones    Negative


 


Urine Blood    2+ H


 


Urine Nitrate    Negative


 


Urine Bilirubin    Negative


 


Urine Urobilinogen    Normal


 


Ur Leukocyte Esterase    2+ H


 


Urine WBC (Auto)    59 H


 


Urine RBC (Auto)    16 H


 


Ur Squamous Epith Cells    < 1


 


Urine Bacteria    Occ H














Assessment & Plan





- Assessment and Plan (Free Text)


Assessment: 


Mr Marshall is a 67 year old male with a PMHx of HTN, multi-vessel CAD (

reluctant to have cabg), CHF, NIDDM2, HLD, long term indwelling dalal catheter (

since 6/2017), prior infected right inguinal hernia mesh, and hx of multiple 

AMAs who presents with complaints of chest pain and shortness of breath:





DANIEL on CKD


follow-up renal/bladder ultrasound to assess for obstructive nephropathy


assess etiology of DANIEL by following-up urine lytes, urine protein, microalbumin


better assess severity of CKD by following-up PTH-intact, Vitamin D 25-OH





HTN


hx of non-compliance with meds


patient could not recall his home medications, chart review shows previous home 

meds included enalapril 5mg po qd and metoprolol 25mg po qd





Long Dwelling Dalal


hx of complicated UTI associated with indwelling dalal with ESBL klebsiella 

bacteremia 11/2017


dalal present since 5/2017, inserted after right inguinal hernia repair at [facility]


follow-up UA, Ur Cx





NIDDM2


latest Hgba1c on record from 7/2017 -> 6.5


random glucose today normal





NORMAL ANION GAP METABOLIC ACIDOSIS


patient denied diarrhea


will need to ascertain home meds


monitor





case d/w Dr Wagner





<Alfonso Wagner - Last Filed: 07/10/18 18:09>





Meds





- Medications


Medications: 


 Current Medications





Amlodipine Besylate (Norvasc)  10 mg PO DAILY VICTORINO


Hydralazine HCl (Apresoline)  10 mg PO QID VICTORINO











Results





- Vital Signs


Recent Vital Signs: 


 Last Vital Signs











Temp  98.3 F   07/10/18 17:17


 


Pulse  74   07/10/18 17:17


 


Resp  18   07/10/18 17:24


 


BP  152/86 H  07/10/18 17:17


 


Pulse Ox  96   07/10/18 17:17














- Labs


Result Diagrams: 


 07/10/18 11:21





 07/10/18 11:21


Labs: 


 Laboratory Results - last 24 hr











  07/10/18 07/10/18 07/10/18





  11:21 11:21 13:09


 


WBC  8.1  


 


RBC  4.06 L  


 


Hgb  10.6 L  


 


Hct  31.6 L  


 


MCV  77.8 L  


 


MCH  26.2 L  


 


MCHC  33.6  


 


RDW  15.8 H  


 


Plt Count  306  D  


 


MPV  8.3  


 


Neut % (Auto)  50.4  


 


Lymph % (Auto)  38.0  


 


Mono % (Auto)  5.3  


 


Eos % (Auto)  5.9 H  


 


Baso % (Auto)  0.4  


 


Neut # (Auto)  4.1  


 


Lymph # (Auto)  3.1  


 


Mono # (Auto)  0.4  


 


Eos # (Auto)  0.5  


 


Baso # (Auto)  0.0  


 


Sodium   143 


 


Potassium   4.9 


 


Chloride   112 H 


 


Carbon Dioxide   16 L 


 


Anion Gap   19 


 


BUN   39 H 


 


Creatinine   2.0 H 


 


Est GFR ( Amer)   40 


 


Est GFR (Non-Af Amer)   33 


 


Random Glucose   97 


 


Calcium   8.3 L 


 


Total Bilirubin   0.7 


 


AST   32 


 


ALT   17 L 


 


Alkaline Phosphatase   74 


 


Troponin I   < 0.0120 


 


NT-Pro-B Natriuret Pep   883 


 


Total Protein   8.0 


 


Albumin   4.0 


 


Globulin   4.0 H 


 


Albumin/Globulin Ratio   1.0 


 


Urine Color    Yellow


 


Urine Clarity    Hazy


 


Urine pH    5.0


 


Ur Specific Gravity    1.012


 


Urine Protein    1+ H


 


Urine Glucose (UA)    Normal


 


Urine Ketones    Negative


 


Urine Blood    2+ H


 


Urine Nitrate    Negative


 


Urine Bilirubin    Negative


 


Urine Urobilinogen    Normal


 


Ur Leukocyte Esterase    2+ H


 


Urine WBC (Auto)    59 H


 


Urine RBC (Auto)    16 H


 


Ur Squamous Epith Cells    < 1


 


Urine Bacteria    Occ H














Attending/Attestation





- Attestation


I have personally seen and examined this patient.: Yes


I have fully participated in the care of the patient.: Yes


I have reviewed all pertinent clinical information: Yes


Notes (Text): 


Patient seen and examined with resident; I agree with the note as above with 

the following additions/edits:





66 yo M w/ pmh of htn, dm, CAD (in need of CABG), CHF w/ mild systolic and 

diastolic dysfunction, chronic in-dwelling dalal catheter, presented to ED 

initially for dalal catheter change; later admitted to chest pain and 

subsequently admitted, nephrology being consulted for acute kidney injury;





Patient is very difficult to obtain history from as he cannot stay focused on 

the questions we are asking; reports not having a urologist despite having 

dalal catheter since past year and failing voiding trials; previous imaging 

showing enlarged heterogeneous prostate and some bladder calcification;





Serum creatinine increased from 1.5 in 1/2018 -> 2.0 today; NSAIDS on home med 

list but patient cannot confirm any of his meds; has albuminuria seen on UA 

multiple times although although confounded by persistent pyuria due to in-

dwelling dalal catheter;





Renal US done today; images reviewed; no hydronephrosis and echogenicity mostly 

preserved although sizes are somewhat small; possible scarring from reflux 

nephropathy;





Otherwise, has moderate metabolic acidosis (mixed gap and non-gap), potassium 

stable; otherwise, no signs of volume excess on exam despite elevate BP;





-Starting gentle IVF w/ 1/2NS (w/ 50 meq sodium bicarb) at 60 cc/hr;


-Checking random urine for protein, microalbumin and creatinine;


-Obtaining limited serologic workup for causes of proteinuric kidney disease;


-Hold ACE inhibitor/ARB for now; start amlodipine 10 mg daily and hydralazine 

10 mg qid for htn;


-Avoid all NSAIDS and nephrotoxic agents;


-Checking iron studies for anemia;


-Checking PTH, 25-OH vitamin D (low/normal Ca); 


-Recommend urology assessment;


-Obtain records from PMD (previous urology referral?);





Thank you for this referral, we will be following up closely.





07/10/18 18:03

## 2018-07-10 NOTE — US
Date of service: 



07/10/2018



PROCEDURE:  Ultrasound of the Kidneys



HISTORY:

pt w/ indwelling dalal; bienvenido



COMPARISON:

None available.



TECHNIQUE:

Sonogram of the kidneys.



FINDINGS:



RIGHT KIDNEY:

Measures: 8.7 x 4.5 x 4.5 cm. 



Normal in size, contour and echogenicity. 



No stone, solid mass lesion or hydronephrosis visualized. 



LEFT KIDNEY:

Measures: 9.3 x 5.5 x 5.8 cm. 



Normal in size, contour and echogenicity. 



No stone, solid mass lesion or hydronephrosis visualized. 



OTHER FINDINGS:

Bladder decompressed around a Dalal catheter.



IMPRESSION:

Unremarkable renal sonogram.

## 2018-07-10 NOTE — CP.PCM.CON
History of Present Illness





- History of Present Illness


History of Present Illness: 





Patient seen examined.  full consult to follow.





history of CAD ( multivessel CAD) HTn renal insufficiency who presents with 

abdominal pain and chest pain.  No current chest pain.





recommend cardiac enzymes x 3.  





Past Patient History





- Infectious Disease


Hx of Infectious Diseases: None





- Tetanus Immunizations


Tetanus Immunization: Unknown





- Past Medical History & Family History


Past Medical History?: Yes





- Past Social History


Smoking Status: Never Smoked





- CARDIAC


Hx Hypercholesterolemia: Yes


Hx Hypertension: Yes





- PULMONARY


Hx Respiratory Disorders: No





- NEUROLOGICAL


Hx Neurological Disorder: No





- HEENT


Hx HEENT Problems: No





- RENAL


Hx Chronic Kidney Disease: No





- ENDOCRINE/METABOLIC


Hx Endocrine Disorders: Yes


Hx Diabetes Mellitus Type 2: Yes





- HEMATOLOGICAL/ONCOLOGICAL


Hx Human Immunodeficiency Virus (HIV): No





- INTEGUMENTARY


Hx Dermatological Problems: No





- MUSCULOSKELETAL/RHEUMATOLOGICAL


Hx Falls: No





- GASTROINTESTINAL


Hx Gastrointestinal Disorders: Yes


Other/Comment: Hernia repair.  'Colon surgery'





- GENITOURINARY/GYNECOLOGICAL


Hx Genitourinary Disorders: Yes


Hx Prostate Problems: Yes


Hx Urinary Tract Infection: Yes


Other/Comment: Olivo Catheter





- PSYCHIATRIC


Hx Substance Use: No





- SURGICAL HISTORY


Hx Surgeries: Yes


Hx Herniorrhaphy: Yes





- ANESTHESIA


Hx Anesthesia: Yes


Hx Anesthesia Reactions: No


Hx Malignant Hyperthermia: No





Meds


Allergies/Adverse Reactions: 


 Allergies











Allergy/AdvReac Type Severity Reaction Status Date / Time


 


No Known Allergies Allergy   Verified 01/22/18 05:43














Results





- Vital Signs


Recent Vital Signs: 


 Last Vital Signs











Temp  98.1 F   07/10/18 10:48


 


Pulse  68   07/10/18 13:49


 


Resp  16   07/10/18 13:49


 


BP  176/78 H  07/10/18 13:49


 


Pulse Ox  100   07/10/18 13:49














- Labs


Result Diagrams: 


 07/10/18 11:21





 07/10/18 11:21


Labs: 


 Laboratory Results - last 24 hr











  07/10/18 07/10/18 07/10/18





  11:21 11:21 13:09


 


WBC  8.1  


 


RBC  4.06 L  


 


Hgb  10.6 L  


 


Hct  31.6 L  


 


MCV  77.8 L  


 


MCH  26.2 L  


 


MCHC  33.6  


 


RDW  15.8 H  


 


Plt Count  306  D  


 


MPV  8.3  


 


Neut % (Auto)  50.4  


 


Lymph % (Auto)  38.0  


 


Mono % (Auto)  5.3  


 


Eos % (Auto)  5.9 H  


 


Baso % (Auto)  0.4  


 


Neut # (Auto)  4.1  


 


Lymph # (Auto)  3.1  


 


Mono # (Auto)  0.4  


 


Eos # (Auto)  0.5  


 


Baso # (Auto)  0.0  


 


Sodium   143 


 


Potassium   4.9 


 


Chloride   112 H 


 


Carbon Dioxide   16 L 


 


Anion Gap   19 


 


BUN   39 H 


 


Creatinine   2.0 H 


 


Est GFR ( Amer)   40 


 


Est GFR (Non-Af Amer)   33 


 


Random Glucose   97 


 


Calcium   8.3 L 


 


Total Bilirubin   0.7 


 


AST   32 


 


ALT   17 L 


 


Alkaline Phosphatase   74 


 


Troponin I   < 0.0120 


 


NT-Pro-B Natriuret Pep   883 


 


Total Protein   8.0 


 


Albumin   4.0 


 


Globulin   4.0 H 


 


Albumin/Globulin Ratio   1.0 


 


Urine Color    Yellow


 


Urine Clarity    Hazy


 


Urine pH    5.0


 


Ur Specific Gravity    1.012


 


Urine Protein    1+ H


 


Urine Glucose (UA)    Normal


 


Urine Ketones    Negative


 


Urine Blood    2+ H


 


Urine Nitrate    Negative


 


Urine Bilirubin    Negative


 


Urine Urobilinogen    Normal


 


Ur Leukocyte Esterase    2+ H


 


Urine WBC (Auto)    59 H


 


Urine RBC (Auto)    16 H


 


Ur Squamous Epith Cells    < 1


 


Urine Bacteria    Occ H

## 2018-07-10 NOTE — RAD
Date of service: 



07/10/2018



PROCEDURE:  CHEST RADIOGRAPH, 1 VIEW



HISTORY:

SOB



COMPARISON:

Chest radiograph dated 01/03/2018.



FINDINGS:



LUNGS:

Clear.



PLEURA:

No pneumothorax or pleural fluid seen.



CARDIOVASCULAR:

Atherosclerotic aortic calcifications.  Cardiomediastinal silhouette 

stably prominent.



OSSEOUS STRUCTURES:

Unchanged.



VISUALIZED UPPER ABDOMEN:

Normal.



OTHER FINDINGS:

None. 



IMPRESSION:

No active disease.

## 2018-10-03 ENCOUNTER — HOSPITAL ENCOUNTER (EMERGENCY)
Dept: HOSPITAL 31 - C.ER | Age: 68
Discharge: HOME | End: 2018-10-03
Payer: MEDICAID

## 2018-10-03 VITALS
HEART RATE: 70 BPM | SYSTOLIC BLOOD PRESSURE: 189 MMHG | DIASTOLIC BLOOD PRESSURE: 94 MMHG | RESPIRATION RATE: 20 BRPM | OXYGEN SATURATION: 98 %

## 2018-10-03 VITALS — TEMPERATURE: 98.6 F

## 2018-10-03 VITALS — BODY MASS INDEX: 23 KG/M2

## 2018-10-03 DIAGNOSIS — I82.492: Primary | ICD-10-CM

## 2018-10-03 LAB
ALBUMIN SERPL-MCNC: 3.9 G/DL (ref 3.5–5)
ALBUMIN/GLOB SERPL: 1 {RATIO} (ref 1–2.1)
ALT SERPL-CCNC: 16 U/L (ref 21–72)
AST SERPL-CCNC: 17 U/L (ref 17–59)
BASOPHILS # BLD AUTO: 0 K/UL (ref 0–0.2)
BASOPHILS NFR BLD: 0.5 % (ref 0–2)
BUN SERPL-MCNC: 30 MG/DL (ref 9–20)
CALCIUM SERPL-MCNC: 8.8 MG/DL (ref 8.6–10.4)
EOSINOPHIL # BLD AUTO: 0.5 K/UL (ref 0–0.7)
EOSINOPHIL NFR BLD: 7.2 % (ref 0–4)
ERYTHROCYTE [DISTWIDTH] IN BLOOD BY AUTOMATED COUNT: 15 % (ref 11.5–14.5)
GFR NON-AFRICAN AMERICAN: 35
HGB BLD-MCNC: 9.5 G/DL (ref 12–18)
LYMPHOCYTES # BLD AUTO: 1.7 K/UL (ref 1–4.3)
LYMPHOCYTES NFR BLD AUTO: 25 % (ref 20–40)
MCH RBC QN AUTO: 25.9 PG (ref 27–31)
MCHC RBC AUTO-ENTMCNC: 32.6 G/DL (ref 33–37)
MCV RBC AUTO: 79.5 FL (ref 80–94)
MONOCYTES # BLD: 0.5 K/UL (ref 0–0.8)
MONOCYTES NFR BLD: 7.2 % (ref 0–10)
NEUTROPHILS # BLD: 4 K/UL (ref 1.8–7)
NEUTROPHILS NFR BLD AUTO: 60.1 % (ref 50–75)
NRBC BLD AUTO-RTO: 0 % (ref 0–2)
PLATELET # BLD: 335 K/UL (ref 130–400)
PMV BLD AUTO: 7.3 FL (ref 7.2–11.7)
RBC # BLD AUTO: 3.67 MIL/UL (ref 4.4–5.9)
URATE SERPL-MCNC: 8.7 MG/DL (ref 3.5–8.5)
WBC # BLD AUTO: 6.7 K/UL (ref 4.8–10.8)

## 2018-10-03 NOTE — VASCLAB
Date of service: 



10/03/2018



PROCEDURE:  Left Lower Extremity Venous Duplex Exam. 



HISTORY:

pain and swelling knee and leg 



PRIORS:

None. 



TECHNIQUE:

Left common femoral, femoral, popliteal and posterior tibial, 

peroneal and great saphenous veins were evaluated. Flow was assessed 

with color Doppler, compressibility, assessment of phasic flow and 

augmentation response.



Report prepared by   CARIDAD Nicholas, RVT



FINDINGS:



LEFT:

1. Common Femoral Vein:



1.1.  Compressibility - Fully compressible: Thrombus - None : Flow - 

Phasic: Augmentation -Normal: Reflux - None.



2. Femoral Vein: 



2.1. Compressibility - Fully compressible: Thrombus -  None: Flow - 

Phasic: Augmentation -Normal: Reflux - None.



3. Popliteal Vein: 



3.1. Compressibility - Fully compressible: Thrombus -  None: Flow - 

Phasic: Augmentation -Normal: Reflux - None.



4. Posterior Tibial Vein: 



4.1. Compressibility - Fully compressible: Thrombus -  None: Flow - 

Phasic: Augmentation -Normal: Reflux - None.



5. Peroneal Vein: 



5.1. Compressibility - Fully compressible: Thrombus -  None: Flow - 

Phasic: Augmentation -Normal: Reflux - None.



6. Great Saphenous Vein:

6.1.  Compressibility - Fully compressible: Thrombus - None: Flow - 

Phasic: Augmentation - Normal: Reflux - None.





OTHER FINDINGS:  Dr. uVong notified about the findings. 



IMPRESSION:

Acute thrombosis of the left gastrocnemius vein with severe reduction 

of the venous return.     



Normal venous flow noted in the right common femoral vein.

## 2018-10-03 NOTE — RAD
Date of service: 



10/03/2018



PROCEDURE:  Left Knee Radiographs.



HISTORY:

Pain.



COMPARISON:

None.



FINDINGS:



BONES:

Normal. No fracture. 



JOINTS:

No fracture.  Minimal patellofemoral osteoarthritis.  Medial and 

lateral compartments appear preserved.  No articular erosion. 



JOINT EFFUSION:

Trace joint effusion. 



OTHER FINDINGS:

None.



IMPRESSION:

Mild patellofemoral osteoarthritis.  Trace joint effusion.

## 2018-10-27 ENCOUNTER — HOSPITAL ENCOUNTER (EMERGENCY)
Dept: HOSPITAL 31 - C.ER | Age: 68
Discharge: HOME | End: 2018-10-27
Payer: MEDICAID

## 2018-10-27 VITALS — HEART RATE: 81 BPM | DIASTOLIC BLOOD PRESSURE: 92 MMHG | RESPIRATION RATE: 16 BRPM | SYSTOLIC BLOOD PRESSURE: 162 MMHG

## 2018-10-27 VITALS — OXYGEN SATURATION: 98 % | TEMPERATURE: 97.8 F

## 2018-10-27 VITALS — BODY MASS INDEX: 27 KG/M2

## 2018-10-27 DIAGNOSIS — R42: Primary | ICD-10-CM

## 2018-10-27 DIAGNOSIS — N39.0: ICD-10-CM

## 2018-10-27 LAB
BACTERIA #/AREA URNS HPF: (no result) [,]
BILIRUB UR-MCNC: NEGATIVE [,]
GLUCOSE UR STRIP-MCNC: NORMAL [, MG/DL]
LEUKOCYTE ESTERASE UR-ACNC: (no result) [, LEU/UL]
PH UR STRIP: 5 [,] (ref 5–8)
PROT UR STRIP-MCNC: (no result) [, MG/DL]
RBC # UR STRIP: (no result) [,]
SP GR UR STRIP: 1.01 [,] (ref 1–1.03)
UROBILINOGEN UR-MCNC: NORMAL [, MG/DL] (ref 0.2–1)

## 2018-10-27 NOTE — C.PDOC
Addendum entered and electronically signed by Marzena Francis PA  10/28/18 

16:05: 








Addendum


Addendum: 


The urine is negative for UTI. LS spine shows degenerative disc disease but no 

fractures. On re-exam, the patient reports improvement of symptoms. Lungs are 

CTA, heart is RRR, abdomen is soft, non-tender and the patient is tolerating PO 

well. Ambulatory in the ED with steady gait, (-) numbness, (-) weakness. Follow 

up with the medical doctor/clinic within 1-2 days. Return if worsened.





Original Note:








History Of Present Illness





67 years old male with an indwelling dalal catheter presents to ED for 

complaints of leg bag leaking. Patient also reports he has Hx of chronic dizzine

ss and was feeling dizzy today. Denies pain, fever, abdominal pain, chest pain, 

or shortness of breath. 


Time Seen by Provider: 10/27/18 17:06


Chief Complaint (Nursing): Male Genitourinary


History Per: Patient


History/Exam Limitations: no limitations


Onset/Duration Of Symptoms: Hrs


Current Symptoms Are (Timing): Still Present


Associated Symptoms: denies: Fever, Chills


Recent travel outside of the [place]: No





Past Medical History


Reviewed: Historical Data, Nursing Documentation, Vital Signs


Vital Signs: 





                                Last Vital Signs











Temp  97.8 F   10/27/18 16:36


 


Pulse  62   10/27/18 16:36


 


Resp  18   10/27/18 16:36


 


BP  170/90 H  10/27/18 16:36


 


Pulse Ox  98   10/27/18 16:36














- Medical History


PMH: CAD, Diabetes, HTN, Hypercholesterolemia, Hyperlipidemia, Chronic Kidney 

Disease


Surgical History: Hernia Repair (right inguinal)





- GrabCAD Procedures











MEASURE OF CARDIAC SAMPL & PRESSURE, L HEART, PERC APPROACH (08/05/17)


PLAIN RADIOGRAPHY OF RIGHT AND LEFT HEART USING OTH CONTRAST (08/05/17)








Family History: States: Unknown Family Hx





- Social History


Hx Alcohol Use: No


Hx Substance Use: No





- Immunization History


Hx Tetanus Toxoid Vaccination: No


Hx Influenza Vaccination: Yes


Hx Pneumococcal Vaccination: No





Review Of Systems


Constitutional: Positive for: Other (indwelling dalal catheter leg bag leaking 

).  Negative for: Fever, Chills


Cardiovascular: Negative for: Chest Pain


Respiratory: Negative for: Shortness of Breath


Gastrointestinal: Negative for: Nausea, Vomiting, Abdominal Pain, Diarrhea


Skin: Negative for: Rash


Neurological: Positive for: Dizziness.  Negative for: Weakness, Numbness





Physical Exam





- Physical Exam


Appears: Non-toxic, No Acute Distress


Skin: Normal Color, Warm, Dry, No Rash


Head: Atraumatic, Normacephalic


Eye(s): bilateral: Normal Inspection, PERRL, EOMI


Oral Mucosa: Moist


Neck: Normal ROM, Supple


Chest: Symmetrical, No Tenderness


Cardiovascular: Rhythm Regular, No Murmur


Respiratory: Normal Breath Sounds, No Decreased Breath Sounds, No Rales, No Rhon

chi, No Wheezing


Gastrointestinal/Abdominal: Bowel Sounds (Active ), Soft, No Tenderness


Male Genital: Other (Indwelling dalal catheter in place. No Erythema. No 

Tenderness. )


Extremity: Normal ROM


Extremity: Bilateral: Atraumatic, Normal Color And Temperature, Normal ROM


Pulses: Left Radial: Normal, Right Radial: Normal


Neurological/Psych: Oriented x3, Normal Speech, Normal Motor, Normal Sensation, 

Normal Reflexes, Other (No focal deficits )


Gait: Steady





ED Course And Treatment


O2 Sat by Pulse Oximetry: 98 (RA)


Pulse Ox Interpretation: Normal





Medical Decision Making


Medical Decision Making: 


Plan:


* Antivert


* Urine Culture


* Urinalysis 








Disposition





- Disposition


Referrals: 


Jonah Yu [Staff Provider] - 


Disposition: HOME/ ROUTINE


Disposition Time: 17:49


Condition: STABLE


Additional Instructions: 


Follow up with the medical doctor within 1-2 days. Return if worsened. 


Prescriptions: 


Ciprofloxacin [Cipro] 1 tab PO BID #14 tab


Meclizine HCl 25 mg PO TID PRN #25 tablet


 PRN Reason: Dizziness


Instructions:  Urinary Retention (DC), Dizziness, Nonvertigo, (DC)


Forms:  [facility] (English)





- POA


Present On Arrival: None





- Clinical Impression


Clinical Impression: 


 UTI (urinary tract infection), Dizziness








- PA / NP / Resident Statement


MD/DO has reviewed & agrees with the documentation as recorded.





- Scribe Statement


The provider has reviewed the documentation as recorded by the Felisa Figueredo





All medical record entries made by the Rockyibrudy were at my direction and 

personally dictated by me. I have reviewed the chart and agree that the record 

accurately reflects my personal performance of the history, physical exam, 

medical decision making, and the department course for this patient. I have also

 personally directed, reviewed, and agree with the discharge instructions and 

disposition.

## 2018-11-16 ENCOUNTER — HOSPITAL ENCOUNTER (EMERGENCY)
Dept: HOSPITAL 31 - C.ER | Age: 68
Discharge: HOME | End: 2018-11-16
Payer: MEDICAID

## 2018-11-16 VITALS
DIASTOLIC BLOOD PRESSURE: 92 MMHG | OXYGEN SATURATION: 100 % | HEART RATE: 64 BPM | SYSTOLIC BLOOD PRESSURE: 169 MMHG | RESPIRATION RATE: 18 BRPM | TEMPERATURE: 98.6 F

## 2018-11-16 VITALS — BODY MASS INDEX: 27 KG/M2

## 2018-11-16 DIAGNOSIS — Z46.6: Primary | ICD-10-CM

## 2018-11-16 LAB
BACTERIA #/AREA URNS HPF: (no result) /[HPF]
BILIRUB UR-MCNC: NEGATIVE MG/DL
GLUCOSE UR STRIP-MCNC: NORMAL MG/DL
LEUKOCYTE ESTERASE UR-ACNC: (no result) LEU/UL
PH UR STRIP: 5 [PH] (ref 5–8)
PROT UR STRIP-MCNC: (no result) MG/DL
RBC # UR STRIP: (no result) /UL
SP GR UR STRIP: 1.02 (ref 1–1.03)
URINE AMORPHOUS SEDIMENT: (no result) /UL
UROBILINOGEN UR-MCNC: NORMAL MG/DL (ref 0.2–1)
WBC CLUMPS # UR AUTO: (no result) /HPF

## 2018-11-16 NOTE — C.PDOC
History Of Present Illness


68 year old male presents to the ED for evaluation of leaking from his dalal 

catheter. Patient is also complaining of dizziness. He states the dizziness is 

chronic, which his PMD Dr. Jonah Fernando is aware of. Patient denies fever, 

chills, headache, vision change. 


Time Seen by Provider: 11/16/18 13:31


Chief Complaint (Nursing): Male Genitourinary


History Per: Patient


History/Exam Limitations: no limitations


Onset/Duration Of Symptoms: Hrs


Current Symptoms Are (Timing): Still Present


Quality Of Discomfort: denies: "Pain"


Associated Symptoms: denies: Fever, Chills





Past Medical History


Reviewed: Historical Data, Nursing Documentation, Vital Signs


Vital Signs: 





                                Last Vital Signs











Temp  98.6 F   11/16/18 13:06


 


Pulse  64   11/16/18 13:06


 


Resp  18   11/16/18 13:06


 


BP  169/92 H  11/16/18 13:06


 


Pulse Ox  100   11/16/18 13:06














- Medical History


PMH: CAD, Diabetes, HTN, Hypercholesterolemia, Hyperlipidemia, Chronic Kidney 

Disease


   Denies: HIV


Surgical History: Hernia Repair (right inguinal)





- [facility] Procedures











MEASURE OF CARDIAC SAMPL & PRESSURE, L HEART, PERC APPROACH (08/05/17)


PLAIN RADIOGRAPHY OF RIGHT AND LEFT HEART USING OTH CONTRAST (08/05/17)








Family History: States: Unknown Family Hx





- Social History


Hx Alcohol Use: No


Hx Substance Use: No





- Immunization History


Hx Tetanus Toxoid Vaccination: No


Hx Influenza Vaccination: Yes


Hx Pneumococcal Vaccination: No





Review Of Systems


Constitutional: Negative for: Fever, Chills


Genitourinary: Positive for: Other (leaking dalal catheter )


Neurological: Positive for: Dizziness





Physical Exam





- Physical Exam


Appears: Non-toxic, No Acute Distress


Skin: Normal Color, Warm, Dry


Head: Atraumatic, Normacephalic


Eye(s): bilateral: Normal Inspection


Oral Mucosa: Moist


Neck: Supple


Chest: Symmetrical, No Deformity, No Tenderness


Cardiovascular: Rhythm Regular, No Murmur


Respiratory: Normal Breath Sounds, No Rales, No Rhonchi, No Wheezing


Gastrointestinal/Abdominal: Soft, No Tenderness, No Guarding, No Rebound


Extremity: Normal ROM, Capillary Refill (less than 2 seconds ), Other (dalal 

catheter with clear urine, leaking where it intersects the leg bag )


Neurological/Psych: Oriented x3, Normal Speech, Normal Cognition





ED Course And Treatment





- Laboratory Results


Lab Interpretation: Abnormal (ua mild leukocytosis, nitrite neg.)


O2 Sat by Pulse Oximetry: 100 (on RA)


Pulse Ox Interpretation: Normal


Progress Note: Urinalysis ordered and reviewed.


Reevaluation Time: 15:58


Reassessment Condition: Improved





Medical Decision Making


Medical Decision Making: 





mild pyuria, chronic with leg baq


nitrite neg, defer abx for low susp of symptomatic UTI





leg bag repaired/replaced.





Disposition


Doctor Will See Patient In The: Office


Counseled Patient/Family Regarding: Studies Performed, Diagnosis





- Disposition


Disposition: HOME/ ROUTINE


Disposition Time: 15:59


Condition: GOOD


Forms:  [employer] [facility] (English)





- Clinical Impression


Clinical Impression: 


 Dalal catheter problem








- Scribe Statement


The provider has reviewed the documentation as recorded by the Scribe (Barb Escobedo)


Provider Attestation: 








All medical record entries made by the Scribe were at my direction and 

personally dictated by me. I have reviewed the chart and agree that the record 

accurately reflects my personal performance of the history, physical exam, 

medical decision making, and the department course for this patient. I have also

personally directed, reviewed, and agree with the discharge instructions and 

disposition.

## 2018-11-19 ENCOUNTER — HOSPITAL ENCOUNTER (EMERGENCY)
Dept: HOSPITAL 31 - C.ER | Age: 68
Discharge: LEFT BEFORE DISCHARGE | End: 2018-11-19
Payer: MEDICAID

## 2018-11-19 VITALS — BODY MASS INDEX: 27 KG/M2

## 2018-11-19 DIAGNOSIS — R06.00: Primary | ICD-10-CM

## 2018-11-19 LAB
ALBUMIN SERPL-MCNC: 3.5 G/DL (ref 3.5–5)
ALBUMIN/GLOB SERPL: 1 {RATIO} (ref 1–2.1)
ALT SERPL-CCNC: 12 U/L (ref 21–72)
APTT BLD: 30 SECONDS (ref 21–34)
AST SERPL-CCNC: 18 U/L (ref 17–59)
BASE EXCESS BLDV CALC-SCNC: -8.6 MMOL/L (ref 0–2)
BASOPHILS # BLD AUTO: 0 K/UL (ref 0–0.2)
BASOPHILS NFR BLD: 0.1 % (ref 0–2)
BUN SERPL-MCNC: 26 MG/DL (ref 9–20)
CALCIUM SERPL-MCNC: 7.8 MG/DL (ref 8.6–10.4)
EOSINOPHIL # BLD AUTO: 0.4 K/UL (ref 0–0.7)
EOSINOPHIL NFR BLD: 6.7 % (ref 0–4)
ERYTHROCYTE [DISTWIDTH] IN BLOOD BY AUTOMATED COUNT: 15.6 % (ref 11.5–14.5)
GFR NON-AFRICAN AMERICAN: 35
HGB BLD-MCNC: 9.7 G/DL (ref 12–18)
INR PPP: 1
LYMPHOCYTES # BLD AUTO: 2.1 K/UL (ref 1–4.3)
LYMPHOCYTES NFR BLD AUTO: 38.6 % (ref 20–40)
MCH RBC QN AUTO: 25.6 PG (ref 27–31)
MCHC RBC AUTO-ENTMCNC: 32.3 G/DL (ref 33–37)
MCV RBC AUTO: 79.2 FL (ref 80–94)
MONOCYTES # BLD: 0.4 K/UL (ref 0–0.8)
MONOCYTES NFR BLD: 7.5 % (ref 0–10)
NEUTROPHILS # BLD: 2.6 K/UL (ref 1.8–7)
NEUTROPHILS NFR BLD AUTO: 47.1 % (ref 50–75)
NRBC BLD AUTO-RTO: 0 % (ref 0–2)
PCO2 BLDV: 26 MMHG (ref 40–60)
PH BLDV: 7.37 [PH] (ref 7.32–7.43)
PLATELET # BLD: 305 K/UL (ref 130–400)
PMV BLD AUTO: 7.7 FL (ref 7.2–11.7)
PROTHROMBIN TIME: 11.2 SECONDS (ref 9.7–12.2)
RBC # BLD AUTO: 3.81 MIL/UL (ref 4.4–5.9)
TROPONIN I SERPL-MCNC: 0.01 NG/ML (ref 0–0.12)
VENOUS BLOOD GAS PO2: 169 MM/HG (ref 30–55)
WBC # BLD AUTO: 5.5 K/UL (ref 4.8–10.8)

## 2018-11-19 NOTE — C.PDOC
History Of Present Illness


68 year old male presents to the ED c/o SOB. Patient states he has been having 

dyspnea on exertion associated with occasional chest discomfort and trouble 

sleeping. Patient speaking in complete sentences. Patient denies fever, chills, 

nausea, vomit, diarrhea, rash, weakness, numbness. 


Time Seen by Provider: 11/19/18 20:24


Chief Complaint (Nursing): Shortness Of Breath


History Per: Patient


History/Exam Limitations: no limitations


Onset/Duration Of Symptoms: Days


Current Symptoms Are (Timing): Still Present


Initiating Event: Other


Quality: Dull, Tightness


Exacerbating Factor(s): Exertion


Current Respiratory Medications: See Home Med List


Severity: Moderate


Pain Scale Rating Of: 4


Associated Symptoms: Chest Pain.  denies: Fever, Chills, Sweating


Reports Recently: Treated By A Physician


Recent travel outside of the [place]: No


Additional History Per: Patient





Past Medical History


Reviewed: Historical Data, Nursing Documentation, Vital Signs


Vital Signs: 





                                Last Vital Signs











Temp  98 F   11/19/18 20:08


 


Pulse  85   11/19/18 20:08


 


Resp  20   11/19/18 20:08


 


BP  156/89 H  11/19/18 20:08


 


Pulse Ox  99   11/19/18 20:08














- Medical History


PMH: CAD, Diabetes, HTN, Hypercholesterolemia, Hyperlipidemia, Chronic Kidney 

Disease


   Denies: HIV


Surgical History: Hernia Repair (right inguinal)





- [facility] Procedures











MEASURE OF CARDIAC SAMPL & PRESSURE, L HEART, PERC APPROACH (08/05/17)


PLAIN RADIOGRAPHY OF RIGHT AND LEFT HEART USING OTH CONTRAST (08/05/17)








Family History: States: No Known Family Hx





- Social History


Hx Alcohol Use: No


Hx Substance Use: No





- Immunization History


Hx Tetanus Toxoid Vaccination: No


Hx Influenza Vaccination: Yes


Hx Pneumococcal Vaccination: No





Review Of Systems


Constitutional: Negative for: Fever, Chills


Eyes: Negative for: Redness


ENT: Negative for: Nose Discharge


Cardiovascular: Positive for: Chest Pain


Respiratory: Positive for: Shortness of Breath


Gastrointestinal: Negative for: Nausea, Vomiting, Abdominal Pain


Genitourinary: Negative for: Dysuria


Musculoskeletal: Negative for: Back Pain


Skin: Negative for: Rash


Neurological: Negative for: Weakness


Psych: Negative for: Anxiety





Physical Exam





- Physical Exam


Appears: Non-toxic


Skin: Warm, Dry


Head: Normacephalic


Eye(s): bilateral: Normal Inspection


Teeth: Edentulous


Neck: Supple


Chest: Symmetrical


Cardiovascular: Rhythm Regular


Respiratory: No Rales, No Rhonchi, No Wheezing


Gastrointestinal/Abdominal: Bowel Sounds (active), Soft, No Tenderness, No 

Guarding, No Rebound


Male Genital: Other (inguinal dalal)


Extremity: No Pedal Edema


Extremity: Bilateral: Atraumatic, Normal Color And Temperature, Normal ROM


Neurological/Psych: Oriented x3, Normal Speech, Normal Cognition


Gait: Steady





ED Course And Treatment





- Laboratory Results


Result Diagrams: 


                                 11/19/18 20:53





                                 11/19/18 20:53


ECG: Interpreted By Me, Viewed By Me


ECG Rhythm: Sinus Rhythm (85), 1st Degree HB, Nonspecific Changes


O2 Sat by Pulse Oximetry: 99 (ON RA)


Pulse Ox Interpretation: Normal





- Radiology


CXR: Interpreted by Me, Viewed By Me


CXR Interpretation: No: Infiltrates, Fracture


Reevaluation Time: 23:03





Against Medical Advice





- AMA


Patient Left Against Medical Advice: 


The patient declines admission to the hospital and wishes to leave the Emergency

Department.  This action is against my medical advice. This decision was made 

with informed refusal. The patient was told that admission to the hospital is 

necessary.  Explanation of the reasons why were discussed.  


The risks of leaving were explained to the patient and include, but are not 

limited to, worsening of known or currently unknown conditions, permanent 

disability and death from undiagnosed or untreated conditions. 


The patient has the capacity to make this informed decision and understands my 

explanation of the current medical problem and risks of leaving. 


The patient voluntarily accepts these risks and signed an AMA form documenting 

our conversation.


The patient was given the opportunity to ask questions and reconsider.  The 

patient was encouraged to return to the Emergency Department at any time for 

further care.








Medical Decision Making


Medical Decision Making: 


Plan:


* VBG


* EKG


* Labs


* CXR


* Aspirin 325 mg PO


* UA





Disposition


Counseled Patient/Family Regarding: Studies Performed, Diagnosis, Need For 

Followup





- Disposition


Referrals: 


Jonah Yu [Staff Provider] - 


Disposition: AGAINST MEDICAL ADVICE


Disposition Time: 21:02


Condition: FAIR


Additional Instructions: 


Please return if symptoms recur


Instructions:  Shortness of Breath (Dyspnea) (DC)


Forms:  [facility] (English)





- Clinical Impression


Clinical Impression: 


 Dyspnea








- Scribe Statement


The provider has reviewed the documentation as recorded by the Scribe


Vignesh Chung





All medical record entries made by the Scribe were at my direction and pe

rsonally dictated by me. I have reviewed the chart and agree that the record 

accurately reflects my personal performance of the history, physical exam, 

medical decision making, and the department course for this patient. I have also

personally directed, reviewed, and agree with the discharge instructions and 

disposition.

## 2018-11-20 VITALS
DIASTOLIC BLOOD PRESSURE: 72 MMHG | HEART RATE: 81 BPM | OXYGEN SATURATION: 99 % | TEMPERATURE: 98 F | RESPIRATION RATE: 14 BRPM | SYSTOLIC BLOOD PRESSURE: 123 MMHG

## 2018-11-20 NOTE — RAD
Date of service: 



11/19/2018



PROCEDURE:  CHEST RADIOGRAPH, 1 VIEW



HISTORY:

chest pain



COMPARISON:

9/10/2018



FINDINGS:



LUNGS:

Shallow lung volumes as before.  No consolidation.  Left lateral 

discoid atelectasis and or scarring-similar



PLEURA:

No pneumothorax or pleural fluid seen.



CARDIOVASCULAR:

 There is presence of aortic atherosclerotic calcification on x-ray. 

Marked tortuosity of the thoracic aorta as before.  Mild cardiomegaly 

suggested-stable



Mild pulmonary venous congestion possibly chronic-suspect even 

allowing for the crowding from shallow lung volumes 



OSSEOUS STRUCTURES:

No significant abnormalities.



VISUALIZED UPPER ABDOMEN:

Normal.



OTHER FINDINGS:

None. 



IMPRESSION:

Chronic changes as above.

## 2018-12-17 NOTE — C.PDOC
History Of Present Illness


68 y/o male presents to the ED with complaints of 2 days of pain and swelling in

left knee, radiating to the left distal calf. States he has hx of gout, but 

never had symptoms in his knee. Pain is now so severe patient reports he cannot 

walk. Otherwise he denies fever, redness, or DVT risk factors. Also requesting 

to have dalal catheter changed, which has been in place for 2 months. Patient 

states his catheter initially was placed after hernia repair 2 months ago. 





Time Seen by Provider: 10/03/18 13:18


Chief Complaint (Nursing): Lower Extremity Problem/Injury


History Per: Patient


History/Exam Limitations: no limitations


Onset/Duration Of Symptoms: Days (x2)


Current Symptoms Are (Timing): Still Present





Past Medical History


Reviewed: Historical Data, Nursing Documentation, Vital Signs


Vital Signs: 





                                Last Vital Signs











Temp  98.6 F   10/03/18 12:24


 


Pulse  631 H  10/03/18 12:24


 


Resp  63 H  10/03/18 12:24


 


BP  148/80   10/03/18 12:24


 


Pulse Ox  100   10/03/18 12:24














- Medical History


PMH: CAD, Diabetes, HTN, Hypercholesterolemia, Hyperlipidemia


   Denies: HIV, Chronic Kidney Disease


Surgical History: Hernia Repair (right inguinal)





- [facility] Procedures











MEASURE OF CARDIAC SAMPL & PRESSURE, L HEART, PERC APPROACH (08/05/17)


PLAIN RADIOGRAPHY OF RIGHT AND LEFT HEART USING OTH CONTRAST (08/05/17)








Family History: States: Unknown Family Hx





- Social History


Hx Alcohol Use: No


Hx Substance Use: No





- Immunization History


Hx Tetanus Toxoid Vaccination: No


Hx Influenza Vaccination: No


Hx Pneumococcal Vaccination: No





Review Of Systems


Except As Marked, All Systems Reviewed And Found Negative.


Constitutional: Negative for: Fever, Chills


Musculoskeletal: Positive for: Leg Pain (left knee pain)


Skin: Negative for: Rash


Neurological: Negative for: Weakness, Numbness, Incoordination





Physical Exam





- Physical Exam


Appears: Non-toxic, No Acute Distress


Skin: Normal Color, Warm, Dry


Head: Atraumatic, Normacephalic


Eye(s): bilateral: Normal Inspection, PERRL, EOMI


Neck: Normal ROM


Chest: Symmetrical


Cardiovascular: Rhythm Regular, No Murmur


Respiratory: Normal Breath Sounds, No Accessory Muscle Use


Extremity: Normal ROM, No Calf Tenderness (or swelling to calf), Capillary 

Refill (less than 2 sec), Swelling (Left knee appears mildly swollen, no 

erythema or increased warmth), Other (No lymphadenopathy in groin)


Pulses: Left Dorsalis Pedis: Normal, Right Dorsalis Pedis: Normal


Neurological/Psych: Oriented x3, Normal Speech





ED Course And Treatment





- Laboratory Results


Result Diagrams: 


                                 10/03/18 14:28





                                 10/03/18 14:28


Lab Interpretation: No Changes Compared To Prior Results


O2 Sat by Pulse Oximetry: 100 (RA)


Pulse Ox Interpretation: Normal





- Other Rad


  ** Left knee x-ray


X-Ray: Viewed By Me, Read By Radiologist


Interpretation: Accession No. : [id number].  Patient Name / ID : NIESHA 

[facility]  / [id number].  Exam Date : 10/03/2018 13:42:31 ( Approved ).  Study 

Comment :  Sex / Age : M  / 67Y.  Creator : Reece Yu MD.  Dictator 

: Reece Yu MD.   :  Approver : Reece Yu MD.  

Approver2 :  Report Date : 10/03/2018 14:20:41.  My Comment :  

*******************************************

****************************************.  Date of service:  10/03/2018.  

PROCEDURE:  Left Knee Radiographs.  HISTORY:  Pain.  COMPARISON:  None.  

FINDINGS:  BONES:  Normal. No fracture.  JOINTS:  No fracture.  Minimal 

patellofemoral osteoarthritis.  Medial and lateral compartments appear 

preserved.  No articular erosion.  JOINT EFFUSION:  Trace joint effusion.  OTHER

FINDINGS:  None.  IMPRESSION:  Mild patellofemoral osteoarthritis.  Trace joint 

effusion.





- CT Scan/US


  ** Venous doppler left leg


Other Rad Studies (CT/US): Read By Radiologist, Radiology Report Reviewed


CT/US Interpretation: Accession No. : [id number].  Patient Name / ID : 

NIESHA HERRMANNDOUH  / [id number].  Exam Date : 10/03/2018 14:38:55 ( Approved ).  

Study Comment :  Sex / Age : M  / 67Y.  Creator : Wan Carver.  Dictator

: Wan Carver.   :  Approver : Ashu Gold MD.  Approver2

:  Report Date : 10/03/2018 15:38:25.  My Comment :  

*******************************************************************************

****.  Date of service:  10/03/2018.  PROCEDURE:  Left Lower Extremity Venous 

Duplex Exam.  HISTORY:  pain and swelling knee and leg.  PRIORS:  None.  

TECHNIQUE:  Left common femoral, femoral, popliteal and posterior tibial, 

peroneal and great saphenous veins were evaluated. Flow was assessed with color 

Doppler, compressibility, assessment of phasic flow and augmentation response.  

Report prepared by   Wan Carver, CARIDAD, RVT.  FINDINGS:  LEFT:  1. Common

Femoral Vein:  1.1.  Compressibility - Fully compressible: Thrombus - None : 

Flow - Phasic: Augmentation -Normal: Reflux - None.  2. Femoral Vein:  2.1. 

Compressibility - Fully compressible: Thrombus -  None: Flow - Phasic: 

Augmentation -Normal: Reflux - None.  3. Popliteal Vein:  3.1. Compressibility -

Fully compressible: Thrombus -  None: Flow - Phasic: Augmentation -Normal: 

Reflux - None.  4. Posterior Tibial Vein:  4.1. Compressibility - Fully 

compressible: Thrombus -  None: Flow - Phasic: Augmentation -Normal: Reflux - 

None.  5. Peroneal Vein:  5.1. Compressibility - Fully compressible: Thrombus - 

None: Flow - Phasic: Augmentation -Normal: Reflux - None.  6. Great Saphenous 

Vein:  6.1.  Compressibility - Fully compressible: Thrombus - None: Flow - 

Phasic: Augmentation - Normal: Reflux - None.  OTHER FINDINGS:  Dr. Vuong 

notified about the findings.  IMPRESSION:  Acute thrombosis of the left 

gastrocnemius vein with severe reduction of the venous return.  Normal venous 

flow noted in the right common femoral vein.


Reevaluation Time: 17:31


Reassessment Condition: Unchanged (after Tramadol)





- Physician Consult Information


Time Consulting Physician Contacted: 17:31


Outcome Of Conversation: Case discussed with Dr Yu and Dr Tobias. Patient

to be discharged on Eliquis 2.5mg BID.





Medical Decision Making


Medical Decision Making: 





Impression: 67 year old with left knee/calf pain 





Plan:


--Uric acid


--CMP


--D dimer


--CBC


--Doppler ultrasound, left lower extremity


--Left knee x-ray


--Reassess and dispo








Disposition


Counseled Patient/Family Regarding: Studies Performed, Diagnosis, Need For 

Followup, Rx Given





- Disposition


Referrals: 


Jonah Yu [Staff Provider] - 


Disposition: HOME/ ROUTINE


Disposition Time: 17:32


Condition: STABLE


Prescriptions: 


Apixaban [Eliquis] 2.5 mg PO BID #60 tab


Tramadol HCl [Ultram] 50 mg PO QID PRN #20 tablet


 PRN Reason: Pain, Severe (8-10)


Instructions:  Venous Duplex Ultrasound, Deep Vein Thrombosis (Blood Clots in 

the Legs) (DC)


Forms:  [facility] (English)





- Clinical Impression


Clinical Impression: 


 DVT (deep venous thrombosis)








- Scribe Statement


The provider has reviewed the documentation as recorded by the Scribe (Alexandra Munoz)


Provider Attestation: 








All medical record entries made by the Scribe were at my direction and 

personally dictated by me. I have reviewed the chart and agree that the record 

accurately reflects my personal performance of the history, physical exam, 

medical decision making, and the department course for this patient. I have also

 personally directed, reviewed, and agree with the discharge instructions and 

disposition. no

## 2018-12-21 ENCOUNTER — HOSPITAL ENCOUNTER (EMERGENCY)
Dept: HOSPITAL 31 - C.ER | Age: 68
Discharge: HOME | End: 2018-12-21
Payer: MEDICAID

## 2018-12-21 VITALS — SYSTOLIC BLOOD PRESSURE: 145 MMHG | HEART RATE: 79 BPM | RESPIRATION RATE: 16 BRPM | DIASTOLIC BLOOD PRESSURE: 69 MMHG

## 2018-12-21 VITALS — BODY MASS INDEX: 28.3 KG/M2

## 2018-12-21 VITALS — TEMPERATURE: 97.9 F

## 2018-12-21 DIAGNOSIS — R33.9: ICD-10-CM

## 2018-12-21 DIAGNOSIS — E11.22: ICD-10-CM

## 2018-12-21 DIAGNOSIS — Z46.6: Primary | ICD-10-CM

## 2018-12-21 DIAGNOSIS — R06.02: ICD-10-CM

## 2018-12-21 DIAGNOSIS — I12.9: ICD-10-CM

## 2018-12-21 DIAGNOSIS — N18.9: ICD-10-CM

## 2018-12-21 DIAGNOSIS — I25.10: ICD-10-CM

## 2018-12-21 NOTE — C.PDOC
History Of Present Illness





Patient presents to ED requesting change of his dalal catheter, which he states 

has been present for approx 1 year (after a hernia repari surgery), and is 

changed in the ER on monthly basis.  Patient does not have urologist, admits he 

has been instructed to see one but has not yet done so.  He is alco c/o chronic 

exertional SOB, states he has any upcoming cardiology appt.  He denies chest 

pain, cough, fever, palpitations, abdominal pain, dysuria/hematuria, flank pain.


Time Seen by Provider: 12/21/18 18:24


Chief Complaint (Nursing): Shortness Of Breath


History Per: Patient


History/Exam Limitations: no limitations


Current Respiratory Medications: See Home Med List





Past Medical History


Reviewed: Historical Data, Nursing Documentation, Vital Signs


Vital Signs: 





                                Last Vital Signs











Temp  97.9 F   12/21/18 18:09


 


Pulse  55 L  12/21/18 18:09


 


Resp  22   12/21/18 18:09


 


BP  170/88 H  12/21/18 18:09


 


Pulse Ox  99   12/21/18 18:09














- Medical History


PMH: CAD, Diabetes, HTN, Hypercholesterolemia, Hyperlipidemia, Chronic Kidney 

Disease


Surgical History: Hernia Repair (right inguinal)





- [facility] ??? Procedures











MEASURE OF CARDIAC SAMPL & PRESSURE, L HEART, PERC APPROACH (08/05/17)


PLAIN RADIOGRAPHY OF RIGHT AND LEFT HEART USING OTH CONTRAST (08/05/17)








Family History: States: No Known Family Hx





- Social History


Hx Alcohol Use: No


Hx Substance Use: No





- Immunization History


Hx Tetanus Toxoid Vaccination: No


Hx Influenza Vaccination: Yes


Hx Pneumococcal Vaccination: No





Review Of Systems


Constitutional: Negative for: Fever, Chills


Cardiovascular: Negative for: Chest Pain, Palpitations


Respiratory: Positive for: Shortness of Breath, SOB with Excertion.  Negative 

for: Cough


Gastrointestinal: Negative for: Nausea, Vomiting, Abdominal Pain, Diarrhea


Genitourinary: Positive for: Other (indwelling dalal catheter).  Negative for: 

Dysuria, Hematuria


Skin: Negative for: Rash





Physical Exam





- Physical Exam


Appears: Well, Non-toxic, No Acute Distress


Skin: Normal Color, Warm, Dry, No Rash


Oral Mucosa: Moist


Cardiovascular: Rhythm Regular


Respiratory: Normal Breath Sounds, No Accessory Muscle Use, No Rales, No 

Rhonchi, No Wheezing


Gastrointestinal/Abdominal: Normal Exam, Bowel Sounds, Soft, No Tenderness


Male Genital: Other (dalal catheter in place)


Extremity: Normal ROM, No Pedal Edema


Pulses: Left Dorsalis Pedis: Normal, Right Dorsalis Pedis: Normal


Neurological/Psych: Oriented x3





ED Course And Treatment


ECG: Interpreted By Me, Viewed By Me (sinus rhythm 61 bpm with first degree AV 

block, normal axis, T wave inversions V4-V6)


ECG Interpretation: Abnormal


O2 Sat by Pulse Oximetry: 99 (RA)


Pulse Ox Interpretation: Normal


Progress Note: Foiley cather removed and new on inserted by nurse.  EKG 

reviewed.  Patient refused further workup/testing for SOB, states he has 

cardiology appointment coming up and his symptoms are chronic in nature.  

Patient instructed to follow up with urology within 1 week, and understands he 

should return to ED if his symptoms worsen.





Disposition


Counseled Patient/Family Regarding: Studies Performed, Diagnosis, Need For 

Followup





- Disposition


Referrals: 


Jonah Yu [Staff Provider] - 


Ernesto Duncan MD [Staff Provider] - 


Disposition: HOME/ ROUTINE


Disposition Time: 19:55


Condition: STABLE


Additional Instructions: 


FOLLOW UP WITH UROLOGY WITHIN 1 WEEK!!





FOLLOW UP WITH CARDIOLOGY AS SCHEDULED





RETURN TO ER IF YOU HAVE ANY CONCERNING SYMPTOMS


Instructions:  Urinary Retention (DC)


Forms:  [facility] (English)


Print Language: ENGLISH





- Clinical Impression


Clinical Impression: 


 Urinary catheter (Dalal) change required, Urinary retention, Chronic shortness 

of breath

## 2018-12-24 NOTE — CARD
--------------- APPROVED REPORT --------------





Date of service: 12/21/2018



EKG Measurement

Heart [id number]

TN [id number]

[id number]

[id number]

[id number]



<Conclusion>

Sinus rhythm with 1st degree AV block

ST & T wave abnormality, consider lateral ischemia

Abnormal ECG

## 2018-12-27 VITALS — OXYGEN SATURATION: 99 %

## 2019-03-07 ENCOUNTER — HOSPITAL ENCOUNTER (EMERGENCY)
Dept: HOSPITAL 31 - C.ER | Age: 69
Discharge: LEFT BEFORE DISCHARGE | End: 2019-03-07
Payer: MEDICAID

## 2019-03-07 VITALS
RESPIRATION RATE: 18 BRPM | HEART RATE: 57 BPM | OXYGEN SATURATION: 98 % | SYSTOLIC BLOOD PRESSURE: 146 MMHG | TEMPERATURE: 98.2 F | DIASTOLIC BLOOD PRESSURE: 82 MMHG

## 2019-03-07 VITALS — BODY MASS INDEX: 28.3 KG/M2

## 2019-03-07 DIAGNOSIS — Z02.89: Primary | ICD-10-CM

## 2019-03-28 ENCOUNTER — HOSPITAL ENCOUNTER (EMERGENCY)
Dept: HOSPITAL 31 - C.ER | Age: 69
Discharge: HOME | End: 2019-03-28
Payer: MEDICAID

## 2019-03-28 VITALS
HEART RATE: 74 BPM | TEMPERATURE: 98.2 F | RESPIRATION RATE: 16 BRPM | SYSTOLIC BLOOD PRESSURE: 154 MMHG | DIASTOLIC BLOOD PRESSURE: 82 MMHG

## 2019-03-28 VITALS — OXYGEN SATURATION: 100 %

## 2019-03-28 VITALS — BODY MASS INDEX: 28.3 KG/M2

## 2019-03-28 DIAGNOSIS — I12.9: ICD-10-CM

## 2019-03-28 DIAGNOSIS — I25.10: ICD-10-CM

## 2019-03-28 DIAGNOSIS — R53.1: Primary | ICD-10-CM

## 2019-03-28 DIAGNOSIS — N18.9: ICD-10-CM

## 2019-03-28 DIAGNOSIS — E78.00: ICD-10-CM

## 2019-03-28 LAB
ALBUMIN SERPL-MCNC: 4.1 G/DL (ref 3.5–5)
ALBUMIN/GLOB SERPL: 1.1 {RATIO} (ref 1–2.1)
ALT SERPL-CCNC: < 6 U/L (ref 21–72)
AST SERPL-CCNC: 36 U/L (ref 17–59)
BACTERIA #/AREA URNS HPF: (no result) /[HPF]
BASOPHILS # BLD AUTO: 0.1 K/UL (ref 0–0.2)
BASOPHILS NFR BLD: 1.3 % (ref 0–2)
BILIRUB UR-MCNC: NEGATIVE MG/DL
BUN SERPL-MCNC: 32 MG/DL (ref 9–20)
CALCIUM SERPL-MCNC: 8.4 MG/DL (ref 8.6–10.4)
EOSINOPHIL # BLD AUTO: 0.5 K/UL (ref 0–0.7)
EOSINOPHIL NFR BLD: 8.5 % (ref 0–4)
ERYTHROCYTE [DISTWIDTH] IN BLOOD BY AUTOMATED COUNT: 15.6 % (ref 11.5–14.5)
GFR NON-AFRICAN AMERICAN: 33
GLUCOSE UR STRIP-MCNC: NORMAL MG/DL
HGB BLD-MCNC: 10.8 G/DL (ref 12–18)
LEUKOCYTE ESTERASE UR-ACNC: (no result) LEU/UL
LYMPHOCYTES # BLD AUTO: 2 K/UL (ref 1–4.3)
LYMPHOCYTES NFR BLD AUTO: 37.9 % (ref 20–40)
MCH RBC QN AUTO: 25.8 PG (ref 27–31)
MCHC RBC AUTO-ENTMCNC: 32 G/DL (ref 33–37)
MCV RBC AUTO: 80.8 FL (ref 80–94)
MONOCYTES # BLD: 0.4 K/UL (ref 0–0.8)
MONOCYTES NFR BLD: 7.1 % (ref 0–10)
NEUTROPHILS # BLD: 2.4 K/UL (ref 1.8–7)
NEUTROPHILS NFR BLD AUTO: 45.2 % (ref 50–75)
NRBC BLD AUTO-RTO: 0 % (ref 0–2)
PH UR STRIP: 5 [PH] (ref 5–8)
PLATELET # BLD: 310 K/UL (ref 130–400)
PMV BLD AUTO: 8.3 FL (ref 7.2–11.7)
PROT UR STRIP-MCNC: (no result) MG/DL
RBC # BLD AUTO: 4.19 MIL/UL (ref 4.4–5.9)
RBC # UR STRIP: (no result) /UL
SP GR UR STRIP: 1.01 (ref 1–1.03)
SQUAMOUS EPITHIAL: < 1 /HPF (ref 0–5)
URINE AMORPHOUS SEDIMENT: (no result) /UL
UROBILINOGEN UR-MCNC: NORMAL MG/DL (ref 0.2–1)
WBC # BLD AUTO: 5.4 K/UL (ref 4.8–10.8)
WBC CLUMPS # UR AUTO: (no result) /HPF

## 2019-03-28 NOTE — C.PDOC
History Of Present Illness


68 year old male presents to ED with complaint of dizziness and weakness for 4 

days. Patient is not experiencing dizziness currently. Patient has had an 

indwelling Dalal catheter that has not been changed for 3 months. Patient does n

ot have a urologist. He denies chest  pain, SOB, nausea, and vomiting.  





Time Seen by Provider: 19 13:29


Chief Complaint (Nursing): Male Genitourinary


History Per: Patient


History/Exam Limitations: no limitations


Onset/Duration Of Symptoms: Days (4)


Current Symptoms Are (Timing): Still Present


Associated Symptoms: denies: Nausea, Vomiting, Chest Pain, Other (SOB)





Past Medical History


Reviewed: Historical Data, Nursing Documentation, Vital Signs


Vital Signs: 





                                Last Vital Signs











Temp  97.9 F   19 13:13


 


Pulse  62   19 13:13


 


Resp  20   19 13:13


 


BP  169/81 H  19 13:13


 


Pulse Ox  100   19 13:13














- Medical History


PMH: CAD, Diabetes, HTN, Hypercholesterolemia, Hyperlipidemia, Chronic Kidney 

Disease


   Denies: HIV


Surgical History: Hernia Repair (right inguinal)





- [facility] Procedures











MEASURE OF CARDIAC SAMPL & PRESSURE, L HEART, PERC APPROACH (17)


PLAIN RADIOGRAPHY OF RIGHT AND LEFT HEART USING OTH CONTRAST (17)








Family History: States: Unknown Family Hx





- Social History


Hx Alcohol Use: No


Hx Substance Use: No





- Immunization History


Hx Tetanus Toxoid Vaccination: No


Hx Influenza Vaccination: Yes


Hx Pneumococcal Vaccination: No





Review Of Systems


Except As Marked, All Systems Reviewed And Found Negative.


Cardiovascular: Negative for: Chest Pain


Respiratory: Negative for: Shortness of Breath


Gastrointestinal: Negative for: Nausea, Vomiting


Neurological: Positive for: Weakness, Dizziness





Physical Exam





- Physical Exam


Appears: Well, Non-toxic, No Acute Distress


Skin: Normal Color, Warm, Dry


Head: Atraumatic, Normacephalic


Eye(s): bilateral: Normal Inspection, PERRL, EOMI


Chest: Symmetrical


Cardiovascular: Rhythm Regular, No Murmur


Respiratory: Normal Breath Sounds, No Rales, No Rhonchi, No Wheezing


Gastrointestinal/Abdominal: Normal Exam, Soft, No Tenderness


Male Genital: Normal Inspection, Other (indwelling dalal catheter)


Extremity: Bilateral: Atraumatic, Normal Color And Temperature, Normal ROM


Neurological/Psych: Oriented x3, Normal Speech, Normal Cognition





ED Course And Treatment





- Laboratory Results


Result Diagrams: 


                                 19 14:45





                                 19 14:45


ECG: Interpreted By Me, Viewed By Me


ECG Rhythm: Sinus Rhythm


ECG Interpretation: Normal


Interpretation Of ECG: Normal axis. 1st degree AV block. No ST/T wave 

abnormalities.


Rate From EC


O2 Sat by Pulse Oximetry: 100 (in RA)





- Other Rad


  ** CXR


X-Ray: Interpreted by Me, Viewed By Me


Interpretation: IMPRESSION:  No active pulmonary disease.  Discoid atelectasis 

in the right lung base.





- CT Scan/US


  ** Head CT


Other Rad Studies (CT/US): Interpreted By Me, Read By Radiologist


CT/US Interpretation: IMPRESSION:  Generalized atrophy. Nonspecific white matter

changes. Chronic appearing 5 mm right-sided lacunar infarct.  Mucosal thickening

of the ethmoid air cells and maxillary sinuses. Correlate clinically for 

sinusitis.





Medical Decision Making


Medical Decision Making: 


Assessment: Dizziness and Dalal catheter change





Plan:


Head CT, EKG and CXR ordered for patient. Labs ordered with troponin, urine 

culture, and UA. 





patient is awake and alert and would like to home and do not wish to stay in 

hospital. 





Disposition


Counseled Patient/Family Regarding: Studies Performed, Diagnosis, Need For 

Followup, Rx Given





- Disposition


Referrals: 


Sae Sweeney MD [Medical Doctor] - 


Disposition: HOME/ ROUTINE


Disposition Time: 16:30


Condition: STABLE


Additional Instructions: 


follow up with your doctor within 2 days


call to make an appointment


take medication as ordered


return to ER if symptoms worsens or progress 


Prescriptions: 


Sulfamethoxazole/Trimethoprim [Bactrim  mg-160 mg] 1 tab PO BID #14 tab


Instructions:  Generalized Weakness (DC), Dalal Catheter, Male, Weakness (ED)


Forms:  [facility] (English), General Discharge Instructions





- Clinical Impression


Clinical Impression: 


 Weakness, Dalal catheter in place








- Scribe Statement


The provider has reviewed the documentation as recorded by the Scribe (Olinda Frankel)








All medical record entries made by the Scribe were at my direction and person

ally dictated by me. I have reviewed the chart and agree that the record 

accurately reflects my personal performance of the history, physical exam, 

medical decision making, and the department course for this patient. I have also

personally directed, reviewed, and agree with the discharge instructions and 

disposition.

## 2019-03-28 NOTE — RAD
Date of service: 



03/28/2019



HISTORY:

Shortness of breath 



COMPARISON:

11/19/2018.



TECHNIQUE:

Chest PA and lateral



FINDINGS:



LINES AND TUBES:

None. 



LUNG AND PLEURA:

The lungs are well inflated.  There is discoid atelectasis in the 

right lung base.  No pleural effusion or pneumothorax.



HEART AND MEDIASTINUM:

The heart is not enlarged.  No aortic atherosclerotic calcifications 

present.  The hilar and mediastinal contours are within normal limits.



SKELETAL STRUCTURES:

The bony structures are within normal limits for the patient's age.



VISUALIZED UPPER ABDOMEN:

Normal.



OTHER FINDINGS:

There is elevation of the right hemidiaphragm.



IMPRESSION:

No active pulmonary disease.



Discoid atelectasis in the right lung base.

## 2019-03-29 NOTE — CARD
--------------- APPROVED REPORT --------------





Date of service: 03/28/2019



EKG Measurement

Heart [id number]

PA [id number]

[id number]

[id number]

[id number]



<Conclusion>

Sinus rhythm with 1st degree AV block

Inferior infarct, age undetermined

Abnormal ECG

## 2021-04-14 NOTE — CT
Date of service: 03/28/2019



PROCEDURE:  CT HEAD WITHOUT CONTRAST.



HISTORY:

dizziness



COMPARISON:

Noncontrast head CT performed 9/28/16



TECHNIQUE:

Axial computed tomography images were obtained through the head/brain 

without intravenous contrast.  



Radiation dose:



Total exam DLP = 1049.19 mGy-cm.



This CT exam was performed using one or more of the following dose 

reduction techniques: Automated exposure control, adjustment of the 

mA and/or kV according to patient size, and/or use of iterative 

reconstruction technique.



FINDINGS:



HEMORRHAGE:

No intracranial hemorrhage. 



BRAIN:

Diffuse atrophy with prominence of the ventricles and sulci noted. 



No mass effect or edema. Intracranial atherosclerosis. 



Scattered periventricular and subcortical white matter hypodensities, 

which are nonspecific, but often seen with chronic microvascular 

ischemic disease. Re-identified chronic appearing lacunar infarct 

measures approximately 5 mm on the right adjacent to the lateral 

ventricle posteriorly. 



Please note that MRI with diffusion imaging is more sensitive in the 

detection of acute ischemic event.



VENTRICLES:

No hydrocephalus. 



CALVARIUM:

Unremarkable.



PARANASAL SINUSES:

Mucosal thickening of the ethmoid air cells and maxillary sinuses.



MASTOID AIR CELLS:

Unremarkable as visualized. No inflammatory changes.



OTHER FINDINGS:

None.



IMPRESSION:

Generalized atrophy. Nonspecific white matter changes. Chronic 

appearing 5 mm right-sided lacunar infarct. 



Mucosal thickening of the ethmoid air cells and maxillary sinuses. 

Correlate clinically for sinusitis. No